# Patient Record
Sex: MALE | Race: WHITE | NOT HISPANIC OR LATINO | Employment: FULL TIME | URBAN - METROPOLITAN AREA
[De-identification: names, ages, dates, MRNs, and addresses within clinical notes are randomized per-mention and may not be internally consistent; named-entity substitution may affect disease eponyms.]

---

## 2017-09-26 ENCOUNTER — HOSPITAL ENCOUNTER (OUTPATIENT)
Dept: GENERAL RADIOLOGY | Facility: HOSPITAL | Age: 72
Discharge: HOME | End: 2017-09-26
Admitting: ORTHOPAEDIC SURGERY

## 2017-09-26 ENCOUNTER — HOSPITAL ENCOUNTER (OUTPATIENT)
Dept: GENERAL RADIOLOGY | Facility: HOSPITAL | Age: 72
Discharge: HOME | End: 2017-09-26

## 2017-09-26 ENCOUNTER — APPOINTMENT (OUTPATIENT)
Dept: PREADMISSION TESTING | Facility: HOSPITAL | Age: 72
End: 2017-09-26

## 2017-09-26 VITALS
WEIGHT: 252 LBS | OXYGEN SATURATION: 97 % | DIASTOLIC BLOOD PRESSURE: 75 MMHG | HEART RATE: 60 BPM | HEIGHT: 70 IN | SYSTOLIC BLOOD PRESSURE: 181 MMHG | BODY MASS INDEX: 36.08 KG/M2 | RESPIRATION RATE: 20 BRPM | TEMPERATURE: 97.5 F

## 2017-09-26 LAB
ABO GROUP BLD: NORMAL
ALBUMIN SERPL-MCNC: 4 G/DL (ref 3.5–5.2)
ALBUMIN/GLOB SERPL: 1 G/DL
ALP SERPL-CCNC: 132 U/L (ref 39–117)
ALT SERPL W P-5'-P-CCNC: 30 U/L (ref 1–41)
ANION GAP SERPL CALCULATED.3IONS-SCNC: 12.3 MMOL/L
AST SERPL-CCNC: 21 U/L (ref 1–40)
BILIRUB SERPL-MCNC: 0.4 MG/DL (ref 0.1–1.2)
BILIRUB UR QL STRIP: NEGATIVE
BLD GP AB SCN SERPL QL: NEGATIVE
BUN BLD-MCNC: 11 MG/DL (ref 8–23)
BUN/CREAT SERPL: 12.1 (ref 7–25)
CALCIUM SPEC-SCNC: 9.5 MG/DL (ref 8.6–10.5)
CHLORIDE SERPL-SCNC: 100 MMOL/L (ref 98–107)
CLARITY UR: CLEAR
CO2 SERPL-SCNC: 22.7 MMOL/L (ref 22–29)
COLOR UR: YELLOW
CREAT BLD-MCNC: 0.91 MG/DL (ref 0.76–1.27)
DEPRECATED RDW RBC AUTO: 43.6 FL (ref 37–54)
ERYTHROCYTE [DISTWIDTH] IN BLOOD BY AUTOMATED COUNT: 13.9 % (ref 11.5–14.5)
GFR SERPL CREATININE-BSD FRML MDRD: 82 ML/MIN/1.73
GLOBULIN UR ELPH-MCNC: 4 GM/DL
GLUCOSE BLD-MCNC: 153 MG/DL (ref 65–99)
GLUCOSE UR STRIP-MCNC: ABNORMAL MG/DL
HCT VFR BLD AUTO: 40.7 % (ref 40.4–52.2)
HGB BLD-MCNC: 13.5 G/DL (ref 13.7–17.6)
HGB UR QL STRIP.AUTO: NEGATIVE
INR PPP: 0.99 (ref 0.9–1.1)
KETONES UR QL STRIP: NEGATIVE
LEUKOCYTE ESTERASE UR QL STRIP.AUTO: NEGATIVE
MCH RBC QN AUTO: 28.4 PG (ref 27–32.7)
MCHC RBC AUTO-ENTMCNC: 33.2 G/DL (ref 32.6–36.4)
MCV RBC AUTO: 85.5 FL (ref 79.8–96.2)
NITRITE UR QL STRIP: NEGATIVE
PH UR STRIP.AUTO: 6.5 [PH] (ref 5–8)
PLATELET # BLD AUTO: 235 10*3/MM3 (ref 140–500)
PMV BLD AUTO: 10.1 FL (ref 6–12)
POTASSIUM BLD-SCNC: 4 MMOL/L (ref 3.5–5.2)
PROT SERPL-MCNC: 8 G/DL (ref 6–8.5)
PROT UR QL STRIP: NEGATIVE
PROTHROMBIN TIME: 12.7 SECONDS (ref 11.7–14.2)
RBC # BLD AUTO: 4.76 10*6/MM3 (ref 4.6–6)
RH BLD: POSITIVE
SODIUM BLD-SCNC: 135 MMOL/L (ref 136–145)
SP GR UR STRIP: 1.01 (ref 1–1.03)
UROBILINOGEN UR QL STRIP: ABNORMAL
WBC NRBC COR # BLD: 5.78 10*3/MM3 (ref 4.5–10.7)

## 2017-09-26 PROCEDURE — 85027 COMPLETE CBC AUTOMATED: CPT | Performed by: ORTHOPAEDIC SURGERY

## 2017-09-26 PROCEDURE — 85610 PROTHROMBIN TIME: CPT | Performed by: ORTHOPAEDIC SURGERY

## 2017-09-26 PROCEDURE — 71020 HC CHEST PA AND LATERAL: CPT

## 2017-09-26 PROCEDURE — 86900 BLOOD TYPING SEROLOGIC ABO: CPT | Performed by: ORTHOPAEDIC SURGERY

## 2017-09-26 PROCEDURE — 73560 X-RAY EXAM OF KNEE 1 OR 2: CPT

## 2017-09-26 PROCEDURE — 86901 BLOOD TYPING SEROLOGIC RH(D): CPT | Performed by: ORTHOPAEDIC SURGERY

## 2017-09-26 PROCEDURE — 93010 ELECTROCARDIOGRAM REPORT: CPT | Performed by: INTERNAL MEDICINE

## 2017-09-26 PROCEDURE — 36415 COLL VENOUS BLD VENIPUNCTURE: CPT

## 2017-09-26 PROCEDURE — 93005 ELECTROCARDIOGRAM TRACING: CPT

## 2017-09-26 PROCEDURE — 81003 URINALYSIS AUTO W/O SCOPE: CPT | Performed by: ORTHOPAEDIC SURGERY

## 2017-09-26 PROCEDURE — 86850 RBC ANTIBODY SCREEN: CPT | Performed by: ORTHOPAEDIC SURGERY

## 2017-09-26 PROCEDURE — 80053 COMPREHEN METABOLIC PANEL: CPT | Performed by: ORTHOPAEDIC SURGERY

## 2017-09-26 RX ORDER — CARVEDILOL 6.25 MG/1
6.25 TABLET ORAL 2 TIMES DAILY WITH MEALS
COMMUNITY

## 2017-09-26 RX ORDER — FUROSEMIDE 40 MG/1
40 TABLET ORAL DAILY
COMMUNITY
End: 2019-09-17

## 2017-09-26 RX ORDER — CHLORHEXIDINE GLUCONATE 500 MG/1
CLOTH TOPICAL
COMMUNITY
End: 2017-10-13 | Stop reason: HOSPADM

## 2017-09-26 RX ORDER — ATORVASTATIN CALCIUM 80 MG/1
80 TABLET, FILM COATED ORAL NIGHTLY
COMMUNITY

## 2017-09-26 RX ORDER — GUAIFENESIN 600 MG/1
1200 TABLET, EXTENDED RELEASE ORAL 2 TIMES DAILY
COMMUNITY
End: 2019-09-17

## 2017-09-26 RX ORDER — ALBUTEROL SULFATE 90 UG/1
2 AEROSOL, METERED RESPIRATORY (INHALATION) EVERY 4 HOURS PRN
COMMUNITY

## 2017-09-26 RX ORDER — POTASSIUM CHLORIDE 750 MG/1
10 TABLET, FILM COATED, EXTENDED RELEASE ORAL DAILY
COMMUNITY
End: 2019-09-17

## 2017-09-26 RX ORDER — BUMETANIDE 1 MG/1
1 TABLET ORAL DAILY
COMMUNITY
End: 2019-08-10 | Stop reason: SDUPTHER

## 2017-09-26 RX ORDER — ALPRAZOLAM 0.25 MG/1
0.25 TABLET ORAL 2 TIMES DAILY PRN
COMMUNITY

## 2017-09-26 RX ORDER — AMLODIPINE BESYLATE 10 MG/1
10 TABLET ORAL DAILY
COMMUNITY

## 2017-09-26 RX ORDER — ESCITALOPRAM OXALATE 10 MG/1
10 TABLET ORAL DAILY
COMMUNITY

## 2017-09-26 RX ORDER — ASPIRIN 81 MG/1
81 TABLET, CHEWABLE ORAL DAILY
COMMUNITY
End: 2017-10-13 | Stop reason: HOSPADM

## 2017-09-26 ASSESSMENT — KOOS JR
KOOS JR SCORE: 44.905
KOOS JR SCORE: 17

## 2017-10-10 ENCOUNTER — ANESTHESIA (OUTPATIENT)
Dept: PERIOP | Facility: HOSPITAL | Age: 72
End: 2017-10-10

## 2017-10-10 ENCOUNTER — HOSPITAL ENCOUNTER (INPATIENT)
Facility: HOSPITAL | Age: 72
LOS: 3 days | Discharge: HOME HEALTH | End: 2017-10-13
Attending: ORTHOPAEDIC SURGERY | Admitting: ORTHOPAEDIC SURGERY

## 2017-10-10 ENCOUNTER — ANESTHESIA EVENT (OUTPATIENT)
Dept: PERIOP | Facility: HOSPITAL | Age: 72
End: 2017-10-10

## 2017-10-10 ENCOUNTER — APPOINTMENT (OUTPATIENT)
Dept: GENERAL RADIOLOGY | Facility: HOSPITAL | Age: 72
End: 2017-10-10

## 2017-10-10 DIAGNOSIS — R26.2 DIFFICULTY WALKING: Primary | ICD-10-CM

## 2017-10-10 PROBLEM — M17.9 OA (OSTEOARTHRITIS) OF KNEE: Status: ACTIVE | Noted: 2017-10-10

## 2017-10-10 PROCEDURE — 25010000002 FENTANYL CITRATE (PF) 100 MCG/2ML SOLUTION: Performed by: NURSE ANESTHETIST, CERTIFIED REGISTERED

## 2017-10-10 PROCEDURE — 25010000002 EPINEPHRINE PER 0.1 MG: Performed by: ORTHOPAEDIC SURGERY

## 2017-10-10 PROCEDURE — C1776 JOINT DEVICE (IMPLANTABLE): HCPCS | Performed by: ORTHOPAEDIC SURGERY

## 2017-10-10 PROCEDURE — 25010000002 ONDANSETRON PER 1 MG: Performed by: ORTHOPAEDIC SURGERY

## 2017-10-10 PROCEDURE — C1713 ANCHOR/SCREW BN/BN,TIS/BN: HCPCS | Performed by: ORTHOPAEDIC SURGERY

## 2017-10-10 PROCEDURE — 25010000002 MORPHINE (PF) 10 MG/ML SOLUTION 1 ML VIAL: Performed by: ORTHOPAEDIC SURGERY

## 2017-10-10 PROCEDURE — 25010000002 MIDAZOLAM PER 1 MG: Performed by: ANESTHESIOLOGY

## 2017-10-10 PROCEDURE — 0SRD0J9 REPLACEMENT OF LEFT KNEE JOINT WITH SYNTHETIC SUBSTITUTE, CEMENTED, OPEN APPROACH: ICD-10-PCS | Performed by: ORTHOPAEDIC SURGERY

## 2017-10-10 PROCEDURE — 73560 X-RAY EXAM OF KNEE 1 OR 2: CPT

## 2017-10-10 PROCEDURE — 25010000003 CEFAZOLIN IN DEXTROSE 2-4 GM/100ML-% SOLUTION: Performed by: ORTHOPAEDIC SURGERY

## 2017-10-10 PROCEDURE — 25010000002 HYDROMORPHONE PER 4 MG: Performed by: NURSE ANESTHETIST, CERTIFIED REGISTERED

## 2017-10-10 PROCEDURE — 25010000002 ROPIVACAINE PER 1 MG: Performed by: ORTHOPAEDIC SURGERY

## 2017-10-10 PROCEDURE — 25010000002 PROPOFOL 10 MG/ML EMULSION: Performed by: NURSE ANESTHETIST, CERTIFIED REGISTERED

## 2017-10-10 PROCEDURE — 25010000002 ONDANSETRON PER 1 MG: Performed by: NURSE ANESTHETIST, CERTIFIED REGISTERED

## 2017-10-10 PROCEDURE — 25010000002 KETOROLAC TROMETHAMINE PER 15 MG: Performed by: ORTHOPAEDIC SURGERY

## 2017-10-10 DEVICE — TRY TIB INTERLOK PRI 75MM: Type: IMPLANTABLE DEVICE | Status: FUNCTIONAL

## 2017-10-10 DEVICE — PAT 3PEG THN 34X7.8 34MM: Type: IMPLANTABLE DEVICE | Status: FUNCTIONAL

## 2017-10-10 DEVICE — STEM TIB PRI FINN 46X40MM: Type: IMPLANTABLE DEVICE | Status: FUNCTIONAL

## 2017-10-10 DEVICE — CMT BONE PALACOS 120001: Type: IMPLANTABLE DEVICE | Status: FUNCTIONAL

## 2017-10-10 DEVICE — IMPLANTABLE DEVICE: Type: IMPLANTABLE DEVICE | Status: FUNCTIONAL

## 2017-10-10 DEVICE — CAP TOTL KN CMT PREMIUM: Type: IMPLANTABLE DEVICE | Status: FUNCTIONAL

## 2017-10-10 DEVICE — COMP FEM/KN VANGUARD INTLK CR 65MM NS LT: Type: IMPLANTABLE DEVICE | Status: FUNCTIONAL

## 2017-10-10 RX ORDER — SODIUM CHLORIDE 0.9 % (FLUSH) 0.9 %
1-10 SYRINGE (ML) INJECTION AS NEEDED
Status: DISCONTINUED | OUTPATIENT
Start: 2017-10-10 | End: 2017-10-13 | Stop reason: HOSPADM

## 2017-10-10 RX ORDER — MORPHINE SULFATE 2 MG/ML
6 INJECTION, SOLUTION INTRAMUSCULAR; INTRAVENOUS
Status: DISCONTINUED | OUTPATIENT
Start: 2017-10-10 | End: 2017-10-13 | Stop reason: HOSPADM

## 2017-10-10 RX ORDER — FENTANYL CITRATE 50 UG/ML
INJECTION, SOLUTION INTRAMUSCULAR; INTRAVENOUS AS NEEDED
Status: DISCONTINUED | OUTPATIENT
Start: 2017-10-10 | End: 2017-10-10 | Stop reason: SURG

## 2017-10-10 RX ORDER — FERROUS SULFATE 325(65) MG
325 TABLET ORAL
Status: DISCONTINUED | OUTPATIENT
Start: 2017-10-11 | End: 2017-10-13 | Stop reason: HOSPADM

## 2017-10-10 RX ORDER — SODIUM CHLORIDE 450 MG/100ML
100 INJECTION, SOLUTION INTRAVENOUS CONTINUOUS
Status: DISCONTINUED | OUTPATIENT
Start: 2017-10-10 | End: 2017-10-13 | Stop reason: HOSPADM

## 2017-10-10 RX ORDER — MIDAZOLAM HYDROCHLORIDE 1 MG/ML
1 INJECTION INTRAMUSCULAR; INTRAVENOUS
Status: DISCONTINUED | OUTPATIENT
Start: 2017-10-10 | End: 2017-10-10 | Stop reason: HOSPADM

## 2017-10-10 RX ORDER — PROMETHAZINE HYDROCHLORIDE 25 MG/1
25 SUPPOSITORY RECTAL ONCE AS NEEDED
Status: DISCONTINUED | OUTPATIENT
Start: 2017-10-10 | End: 2017-10-10 | Stop reason: HOSPADM

## 2017-10-10 RX ORDER — ESCITALOPRAM OXALATE 10 MG/1
10 TABLET ORAL DAILY
Status: DISCONTINUED | OUTPATIENT
Start: 2017-10-10 | End: 2017-10-13 | Stop reason: HOSPADM

## 2017-10-10 RX ORDER — ONDANSETRON 2 MG/ML
4 INJECTION INTRAMUSCULAR; INTRAVENOUS EVERY 6 HOURS PRN
Status: DISCONTINUED | OUTPATIENT
Start: 2017-10-10 | End: 2017-10-13 | Stop reason: HOSPADM

## 2017-10-10 RX ORDER — OXYCODONE HYDROCHLORIDE AND ACETAMINOPHEN 5; 325 MG/1; MG/1
2 TABLET ORAL EVERY 4 HOURS PRN
Status: DISCONTINUED | OUTPATIENT
Start: 2017-10-10 | End: 2017-10-11 | Stop reason: SDUPTHER

## 2017-10-10 RX ORDER — UREA 10 %
1 LOTION (ML) TOPICAL NIGHTLY PRN
Status: DISCONTINUED | OUTPATIENT
Start: 2017-10-10 | End: 2017-10-13 | Stop reason: HOSPADM

## 2017-10-10 RX ORDER — CEFAZOLIN SODIUM 2 G/100ML
2 INJECTION, SOLUTION INTRAVENOUS ONCE
Status: COMPLETED | OUTPATIENT
Start: 2017-10-10 | End: 2017-10-10

## 2017-10-10 RX ORDER — HYDRALAZINE HYDROCHLORIDE 20 MG/ML
5 INJECTION INTRAMUSCULAR; INTRAVENOUS
Status: DISCONTINUED | OUTPATIENT
Start: 2017-10-10 | End: 2017-10-10 | Stop reason: HOSPADM

## 2017-10-10 RX ORDER — ACETAMINOPHEN 500 MG
1000 TABLET ORAL ONCE
Status: COMPLETED | OUTPATIENT
Start: 2017-10-10 | End: 2017-10-10

## 2017-10-10 RX ORDER — CELECOXIB 200 MG/1
200 CAPSULE ORAL ONCE
Status: COMPLETED | OUTPATIENT
Start: 2017-10-10 | End: 2017-10-10

## 2017-10-10 RX ORDER — CLINDAMYCIN PHOSPHATE 900 MG/50ML
900 INJECTION INTRAVENOUS ONCE
Status: DISCONTINUED | OUTPATIENT
Start: 2017-10-10 | End: 2017-10-10 | Stop reason: HOSPADM

## 2017-10-10 RX ORDER — GUAIFENESIN 600 MG/1
1200 TABLET, EXTENDED RELEASE ORAL 2 TIMES DAILY
Status: DISCONTINUED | OUTPATIENT
Start: 2017-10-10 | End: 2017-10-13 | Stop reason: HOSPADM

## 2017-10-10 RX ORDER — ACETAMINOPHEN 325 MG/1
650 TABLET ORAL EVERY 4 HOURS PRN
Status: DISCONTINUED | OUTPATIENT
Start: 2017-10-10 | End: 2017-10-13 | Stop reason: HOSPADM

## 2017-10-10 RX ORDER — NALOXONE HCL 0.4 MG/ML
0.2 VIAL (ML) INJECTION AS NEEDED
Status: DISCONTINUED | OUTPATIENT
Start: 2017-10-10 | End: 2017-10-10 | Stop reason: HOSPADM

## 2017-10-10 RX ORDER — DIPHENHYDRAMINE HCL 25 MG
50 CAPSULE ORAL EVERY 6 HOURS PRN
Status: DISCONTINUED | OUTPATIENT
Start: 2017-10-10 | End: 2017-10-13 | Stop reason: HOSPADM

## 2017-10-10 RX ORDER — TRANEXAMIC ACID 100 MG/ML
INJECTION, SOLUTION INTRAVENOUS AS NEEDED
Status: DISCONTINUED | OUTPATIENT
Start: 2017-10-10 | End: 2017-10-10 | Stop reason: SURG

## 2017-10-10 RX ORDER — FUROSEMIDE 40 MG/1
40 TABLET ORAL DAILY
Status: DISCONTINUED | OUTPATIENT
Start: 2017-10-10 | End: 2017-10-13 | Stop reason: HOSPADM

## 2017-10-10 RX ORDER — DIAZEPAM 5 MG/1
5 TABLET ORAL 2 TIMES DAILY PRN
Status: DISCONTINUED | OUTPATIENT
Start: 2017-10-10 | End: 2017-10-13 | Stop reason: HOSPADM

## 2017-10-10 RX ORDER — BISACODYL 10 MG
10 SUPPOSITORY, RECTAL RECTAL DAILY PRN
Status: DISCONTINUED | OUTPATIENT
Start: 2017-10-10 | End: 2017-10-13 | Stop reason: HOSPADM

## 2017-10-10 RX ORDER — OXYCODONE HYDROCHLORIDE AND ACETAMINOPHEN 5; 325 MG/1; MG/1
1 TABLET ORAL EVERY 4 HOURS PRN
Status: DISCONTINUED | OUTPATIENT
Start: 2017-10-10 | End: 2017-10-13 | Stop reason: HOSPADM

## 2017-10-10 RX ORDER — FENTANYL CITRATE 50 UG/ML
50 INJECTION, SOLUTION INTRAMUSCULAR; INTRAVENOUS
Status: DISCONTINUED | OUTPATIENT
Start: 2017-10-10 | End: 2017-10-10 | Stop reason: HOSPADM

## 2017-10-10 RX ORDER — PROMETHAZINE HYDROCHLORIDE 25 MG/ML
12.5 INJECTION, SOLUTION INTRAMUSCULAR; INTRAVENOUS ONCE AS NEEDED
Status: DISCONTINUED | OUTPATIENT
Start: 2017-10-10 | End: 2017-10-10 | Stop reason: HOSPADM

## 2017-10-10 RX ORDER — SENNA AND DOCUSATE SODIUM 50; 8.6 MG/1; MG/1
2 TABLET, FILM COATED ORAL 2 TIMES DAILY
Status: DISCONTINUED | OUTPATIENT
Start: 2017-10-10 | End: 2017-10-13 | Stop reason: HOSPADM

## 2017-10-10 RX ORDER — ALPRAZOLAM 0.5 MG/1
0.25 TABLET ORAL 2 TIMES DAILY PRN
Status: DISCONTINUED | OUTPATIENT
Start: 2017-10-10 | End: 2017-10-13 | Stop reason: HOSPADM

## 2017-10-10 RX ORDER — FLUMAZENIL 0.1 MG/ML
0.2 INJECTION INTRAVENOUS AS NEEDED
Status: DISCONTINUED | OUTPATIENT
Start: 2017-10-10 | End: 2017-10-10 | Stop reason: HOSPADM

## 2017-10-10 RX ORDER — LABETALOL HYDROCHLORIDE 5 MG/ML
5 INJECTION, SOLUTION INTRAVENOUS
Status: DISCONTINUED | OUTPATIENT
Start: 2017-10-10 | End: 2017-10-10 | Stop reason: HOSPADM

## 2017-10-10 RX ORDER — FAMOTIDINE 10 MG/ML
20 INJECTION, SOLUTION INTRAVENOUS ONCE
Status: COMPLETED | OUTPATIENT
Start: 2017-10-10 | End: 2017-10-10

## 2017-10-10 RX ORDER — IPRATROPIUM BROMIDE AND ALBUTEROL SULFATE 2.5; .5 MG/3ML; MG/3ML
3 SOLUTION RESPIRATORY (INHALATION)
Status: DISCONTINUED | OUTPATIENT
Start: 2017-10-10 | End: 2017-10-10

## 2017-10-10 RX ORDER — ASPIRIN 325 MG
325 TABLET, DELAYED RELEASE (ENTERIC COATED) ORAL 2 TIMES DAILY WITH MEALS
Status: DISCONTINUED | OUTPATIENT
Start: 2017-10-10 | End: 2017-10-13 | Stop reason: HOSPADM

## 2017-10-10 RX ORDER — OXYCODONE HYDROCHLORIDE AND ACETAMINOPHEN 5; 325 MG/1; MG/1
1 TABLET ORAL EVERY 4 HOURS PRN
Status: DISCONTINUED | OUTPATIENT
Start: 2017-10-10 | End: 2017-10-10

## 2017-10-10 RX ORDER — EPHEDRINE SULFATE 50 MG/ML
5 INJECTION, SOLUTION INTRAVENOUS ONCE AS NEEDED
Status: DISCONTINUED | OUTPATIENT
Start: 2017-10-10 | End: 2017-10-10 | Stop reason: HOSPADM

## 2017-10-10 RX ORDER — PROPOFOL 10 MG/ML
VIAL (ML) INTRAVENOUS AS NEEDED
Status: DISCONTINUED | OUTPATIENT
Start: 2017-10-10 | End: 2017-10-10 | Stop reason: SURG

## 2017-10-10 RX ORDER — DIAZEPAM 5 MG/ML
5 INJECTION, SOLUTION INTRAMUSCULAR; INTRAVENOUS 2 TIMES DAILY PRN
Status: ACTIVE | OUTPATIENT
Start: 2017-10-10 | End: 2017-10-11

## 2017-10-10 RX ORDER — OXYCODONE HYDROCHLORIDE AND ACETAMINOPHEN 5; 325 MG/1; MG/1
2 TABLET ORAL EVERY 4 HOURS PRN
Status: DISCONTINUED | OUTPATIENT
Start: 2017-10-10 | End: 2017-10-13 | Stop reason: HOSPADM

## 2017-10-10 RX ORDER — ALBUTEROL SULFATE 90 UG/1
2 AEROSOL, METERED RESPIRATORY (INHALATION) EVERY 4 HOURS PRN
Status: DISCONTINUED | OUTPATIENT
Start: 2017-10-10 | End: 2017-10-13 | Stop reason: HOSPADM

## 2017-10-10 RX ORDER — DIPHENHYDRAMINE HYDROCHLORIDE 50 MG/ML
12.5 INJECTION INTRAMUSCULAR; INTRAVENOUS
Status: DISCONTINUED | OUTPATIENT
Start: 2017-10-10 | End: 2017-10-10 | Stop reason: HOSPADM

## 2017-10-10 RX ORDER — ACETAMINOPHEN 325 MG/1
325 TABLET ORAL EVERY 4 HOURS PRN
Status: DISCONTINUED | OUTPATIENT
Start: 2017-10-10 | End: 2017-10-13 | Stop reason: HOSPADM

## 2017-10-10 RX ORDER — SODIUM CHLORIDE 0.9 % (FLUSH) 0.9 %
1-10 SYRINGE (ML) INJECTION AS NEEDED
Status: DISCONTINUED | OUTPATIENT
Start: 2017-10-10 | End: 2017-10-10 | Stop reason: HOSPADM

## 2017-10-10 RX ORDER — CARVEDILOL 6.25 MG/1
6.25 TABLET ORAL 2 TIMES DAILY WITH MEALS
Status: DISCONTINUED | OUTPATIENT
Start: 2017-10-10 | End: 2017-10-13 | Stop reason: HOSPADM

## 2017-10-10 RX ORDER — ONDANSETRON 4 MG/1
4 TABLET, FILM COATED ORAL EVERY 6 HOURS PRN
Status: DISCONTINUED | OUTPATIENT
Start: 2017-10-10 | End: 2017-10-13 | Stop reason: HOSPADM

## 2017-10-10 RX ORDER — ATORVASTATIN CALCIUM 80 MG/1
80 TABLET, FILM COATED ORAL NIGHTLY
Status: DISCONTINUED | OUTPATIENT
Start: 2017-10-10 | End: 2017-10-13 | Stop reason: HOSPADM

## 2017-10-10 RX ORDER — POTASSIUM CHLORIDE 750 MG/1
10 CAPSULE, EXTENDED RELEASE ORAL DAILY
Status: DISCONTINUED | OUTPATIENT
Start: 2017-10-10 | End: 2017-10-13 | Stop reason: HOSPADM

## 2017-10-10 RX ORDER — HYDROMORPHONE HYDROCHLORIDE 1 MG/ML
0.5 INJECTION, SOLUTION INTRAMUSCULAR; INTRAVENOUS; SUBCUTANEOUS
Status: DISCONTINUED | OUTPATIENT
Start: 2017-10-10 | End: 2017-10-10 | Stop reason: HOSPADM

## 2017-10-10 RX ORDER — ONDANSETRON 4 MG/1
4 TABLET, ORALLY DISINTEGRATING ORAL EVERY 6 HOURS PRN
Status: DISCONTINUED | OUTPATIENT
Start: 2017-10-10 | End: 2017-10-13 | Stop reason: HOSPADM

## 2017-10-10 RX ORDER — CLINDAMYCIN PHOSPHATE 900 MG/50ML
900 INJECTION INTRAVENOUS EVERY 8 HOURS
Status: COMPLETED | OUTPATIENT
Start: 2017-10-10 | End: 2017-10-11

## 2017-10-10 RX ORDER — BUMETANIDE 1 MG/1
1 TABLET ORAL DAILY
Status: DISCONTINUED | OUTPATIENT
Start: 2017-10-10 | End: 2017-10-13 | Stop reason: HOSPADM

## 2017-10-10 RX ORDER — DOCUSATE SODIUM 100 MG/1
100 CAPSULE, LIQUID FILLED ORAL 2 TIMES DAILY PRN
Status: DISCONTINUED | OUTPATIENT
Start: 2017-10-10 | End: 2017-10-13 | Stop reason: HOSPADM

## 2017-10-10 RX ORDER — MAGNESIUM HYDROXIDE 1200 MG/15ML
LIQUID ORAL AS NEEDED
Status: DISCONTINUED | OUTPATIENT
Start: 2017-10-10 | End: 2017-10-10 | Stop reason: HOSPADM

## 2017-10-10 RX ORDER — IPRATROPIUM BROMIDE AND ALBUTEROL SULFATE 2.5; .5 MG/3ML; MG/3ML
3 SOLUTION RESPIRATORY (INHALATION)
Status: DISCONTINUED | OUTPATIENT
Start: 2017-10-10 | End: 2017-10-10 | Stop reason: HOSPADM

## 2017-10-10 RX ORDER — NALOXONE HCL 0.4 MG/ML
0.4 VIAL (ML) INJECTION
Status: DISCONTINUED | OUTPATIENT
Start: 2017-10-10 | End: 2017-10-13 | Stop reason: HOSPADM

## 2017-10-10 RX ORDER — SODIUM CHLORIDE, SODIUM LACTATE, POTASSIUM CHLORIDE, CALCIUM CHLORIDE 600; 310; 30; 20 MG/100ML; MG/100ML; MG/100ML; MG/100ML
9 INJECTION, SOLUTION INTRAVENOUS CONTINUOUS
Status: DISCONTINUED | OUTPATIENT
Start: 2017-10-10 | End: 2017-10-13 | Stop reason: HOSPADM

## 2017-10-10 RX ORDER — ONDANSETRON 2 MG/ML
INJECTION INTRAMUSCULAR; INTRAVENOUS AS NEEDED
Status: DISCONTINUED | OUTPATIENT
Start: 2017-10-10 | End: 2017-10-10 | Stop reason: SURG

## 2017-10-10 RX ORDER — ONDANSETRON 2 MG/ML
4 INJECTION INTRAMUSCULAR; INTRAVENOUS ONCE AS NEEDED
Status: DISCONTINUED | OUTPATIENT
Start: 2017-10-10 | End: 2017-10-10 | Stop reason: HOSPADM

## 2017-10-10 RX ORDER — MIDAZOLAM HYDROCHLORIDE 1 MG/ML
2 INJECTION INTRAMUSCULAR; INTRAVENOUS
Status: DISCONTINUED | OUTPATIENT
Start: 2017-10-10 | End: 2017-10-10 | Stop reason: HOSPADM

## 2017-10-10 RX ORDER — ROCURONIUM BROMIDE 10 MG/ML
INJECTION, SOLUTION INTRAVENOUS AS NEEDED
Status: DISCONTINUED | OUTPATIENT
Start: 2017-10-10 | End: 2017-10-10 | Stop reason: SURG

## 2017-10-10 RX ORDER — KETOROLAC TROMETHAMINE 30 MG/ML
15 INJECTION, SOLUTION INTRAMUSCULAR; INTRAVENOUS EVERY 8 HOURS PRN
Status: DISPENSED | OUTPATIENT
Start: 2017-10-10 | End: 2017-10-12

## 2017-10-10 RX ORDER — AMLODIPINE BESYLATE 10 MG/1
10 TABLET ORAL DAILY
Status: DISCONTINUED | OUTPATIENT
Start: 2017-10-10 | End: 2017-10-13 | Stop reason: HOSPADM

## 2017-10-10 RX ORDER — PROMETHAZINE HYDROCHLORIDE 25 MG/1
25 TABLET ORAL ONCE AS NEEDED
Status: DISCONTINUED | OUTPATIENT
Start: 2017-10-10 | End: 2017-10-10 | Stop reason: HOSPADM

## 2017-10-10 RX ADMIN — ONDANSETRON 4 MG: 2 INJECTION INTRAMUSCULAR; INTRAVENOUS at 15:24

## 2017-10-10 RX ADMIN — SODIUM CHLORIDE, POTASSIUM CHLORIDE, SODIUM LACTATE AND CALCIUM CHLORIDE: 600; 310; 30; 20 INJECTION, SOLUTION INTRAVENOUS at 15:20

## 2017-10-10 RX ADMIN — ATORVASTATIN CALCIUM 80 MG: 80 TABLET, FILM COATED ORAL at 20:29

## 2017-10-10 RX ADMIN — CEFAZOLIN SODIUM 2 G: 2 INJECTION, SOLUTION INTRAVENOUS at 13:47

## 2017-10-10 RX ADMIN — MIDAZOLAM 1 MG: 1 INJECTION INTRAMUSCULAR; INTRAVENOUS at 13:26

## 2017-10-10 RX ADMIN — HYDROMORPHONE HYDROCHLORIDE 0.5 MG: 1 INJECTION, SOLUTION INTRAMUSCULAR; INTRAVENOUS; SUBCUTANEOUS at 16:00

## 2017-10-10 RX ADMIN — ONDANSETRON 4 MG: 2 INJECTION INTRAMUSCULAR; INTRAVENOUS at 18:22

## 2017-10-10 RX ADMIN — FAMOTIDINE 20 MG: 10 INJECTION, SOLUTION INTRAVENOUS at 13:25

## 2017-10-10 RX ADMIN — CARVEDILOL 6.25 MG: 6.25 TABLET, FILM COATED ORAL at 20:29

## 2017-10-10 RX ADMIN — FENTANYL CITRATE 50 MCG: 50 INJECTION INTRAMUSCULAR; INTRAVENOUS at 16:25

## 2017-10-10 RX ADMIN — ASPIRIN 325 MG: 325 TABLET, DELAYED RELEASE ORAL at 20:29

## 2017-10-10 RX ADMIN — FENTANYL CITRATE 100 MCG: 50 INJECTION INTRAMUSCULAR; INTRAVENOUS at 13:43

## 2017-10-10 RX ADMIN — CEFAZOLIN SODIUM 2 G: 2 INJECTION, SOLUTION INTRAVENOUS at 15:14

## 2017-10-10 RX ADMIN — TRANEXAMIC ACID 1000 MG: 100 INJECTION, SOLUTION INTRAVENOUS at 15:00

## 2017-10-10 RX ADMIN — ACETAMINOPHEN 1000 MG: 500 TABLET ORAL at 10:48

## 2017-10-10 RX ADMIN — SODIUM CHLORIDE, POTASSIUM CHLORIDE, SODIUM LACTATE AND CALCIUM CHLORIDE 9 ML/HR: 600; 310; 30; 20 INJECTION, SOLUTION INTRAVENOUS at 13:29

## 2017-10-10 RX ADMIN — PROPOFOL 200 MG: 10 INJECTION, EMULSION INTRAVENOUS at 13:51

## 2017-10-10 RX ADMIN — OXYCODONE HYDROCHLORIDE AND ACETAMINOPHEN 1 TABLET: 5; 325 TABLET ORAL at 20:31

## 2017-10-10 RX ADMIN — CELECOXIB 200 MG: 200 CAPSULE ORAL at 10:48

## 2017-10-10 RX ADMIN — FENTANYL CITRATE 50 MCG: 50 INJECTION INTRAMUSCULAR; INTRAVENOUS at 13:47

## 2017-10-10 RX ADMIN — HYDROMORPHONE HYDROCHLORIDE 0.5 MG: 1 INJECTION, SOLUTION INTRAMUSCULAR; INTRAVENOUS; SUBCUTANEOUS at 15:51

## 2017-10-10 RX ADMIN — KETOROLAC TROMETHAMINE 15 MG: 30 INJECTION, SOLUTION INTRAMUSCULAR at 15:51

## 2017-10-10 RX ADMIN — FENTANYL CITRATE 50 MCG: 50 INJECTION INTRAMUSCULAR; INTRAVENOUS at 16:45

## 2017-10-10 RX ADMIN — ESCITALOPRAM 10 MG: 10 TABLET, FILM COATED ORAL at 20:29

## 2017-10-10 RX ADMIN — SUGAMMADEX 200 MG: 100 INJECTION, SOLUTION INTRAVENOUS at 15:27

## 2017-10-10 RX ADMIN — HYDROMORPHONE HYDROCHLORIDE 0.5 MG: 1 INJECTION, SOLUTION INTRAMUSCULAR; INTRAVENOUS; SUBCUTANEOUS at 16:35

## 2017-10-10 RX ADMIN — ROCURONIUM BROMIDE 40 MG: 10 INJECTION INTRAVENOUS at 13:51

## 2017-10-10 RX ADMIN — GUAIFENESIN 1200 MG: 600 TABLET, EXTENDED RELEASE ORAL at 20:29

## 2017-10-10 RX ADMIN — HYDROMORPHONE HYDROCHLORIDE 0.5 MG: 1 INJECTION, SOLUTION INTRAMUSCULAR; INTRAVENOUS; SUBCUTANEOUS at 16:17

## 2017-10-10 NOTE — ANESTHESIA PROCEDURE NOTES
Airway  Urgency: elective    Date/Time: 10/10/2017 1:53 PM    General Information and Staff    Patient location during procedure: OR  Anesthesiologist: PERICO PEREZ  CRNA: JUDITH RYAN    Indications and Patient Condition  Indications for airway management: airway protection    Preoxygenated: yes  Mask difficulty assessment: 2 - vent by mask + OA or adjuvant +/- NMBA    Final Airway Details  Final airway type: endotracheal airway      Successful airway: ETT  Cuffed: yes   Successful intubation technique: direct laryngoscopy  Endotracheal tube insertion site: oral  Blade: Maravilla  Blade size: #2  ETT size: 8.0 mm  Cormack-Lehane Classification: grade I - full view of glottis  Placement verified by: chest auscultation and capnometry   Cuff volume (mL): 8  Measured from: lips  ETT to lips (cm): 22  Number of attempts at approach: 1    Additional Comments  SIVI.  EYES TAPED CLOSED PRIOR TO DL.  INTUBATION AS CHARTED ABOVE.  APPEARS ATRAUMATIC.  NO CHANGE TO DENTITION. +ETCO2. +BBS. +CR.

## 2017-10-10 NOTE — OP NOTE
Operative Note    Patient Name:  Ricky Nunez  YOB: 1945  Medical Records Number:  [id number]    Date of Procedure:  10/10/2017    Pre-operative Diagnosis:  Primary Osteoarthritis Left Knee    Post-operative Diagnosis:  Primary Osteoarthritis Left Knee    Procedure Performed:  Left Total Knee Arthroplasty    Implants:  Biomet Vanguard TKA, 65 Femoral Component, 75 Tibial Tray with a 40 mm Modular Stem, 34 3-Peg Patella, 13 std.  Polyethylene Insert    Surgeon:  Maksim Pinto M.D.    Assistant: Aida Sales (who was present during the critical portions of the case, thereby decreasing operative time and patient morbidity)    Anesthetic Type:  General    Estimated Blood Loss:  250cc's    Specimens: None    No Complications      Indications for Procedure:  Ricky Nunez is a 72 y.o. male suffering from end stage degenerative changes in the left knee.  The patients pain is becoming disabling, despite extensive conservative care, including NSAIDS, therapy and injections.  The patient wishes to undergo left total knee arthroplasty.  The risks, benefits and alternatives were discussed and the patient wishes to proceed with total knee arthroplasty.      Procedure Performed:    After informed consent was obtained and pre-operative IV Kefzol given, prior to tourniquet inflation, the patient was taken to the operating room and placed supine on the operating table.  After general anesthesia induced, the patient's left lower extremity was prepped with chloraprep and draped in a sterile fashion.    A midline incision was made overlying the left knee and we sharply dissected down to expose the parapatellar retinaculum.  A mid-vastus, muscle sparing, parapatellar arthrotomy was performed and we elevated the soft tissue both medially and laterally.  The menisci and ACL were excised.  We everted the patella and measured this to be 23 mm and we removed 8 mm of bone down to 15mm.  We measured the patella to  be 34 and drilled our three drill holes.  We protected the patella with the patella protector and turned our attention to the femur and the tibia.    We drilled drill holes into the femoral and the tibial intramedullary canals and proceeded to irrigate the canals to remove the fatty marrow.  We used the intramedullary latrice and the 5 degree valgus cut block to make our distal femoral cut.  Once we had a smooth surface, we measured the femur to be 65.  We assured we had rotational alignment using the epicondylar axis, then we used the four-in-one cut block to make our anterior, posterior, anterior and posterior chamfer cuts.  We placed our femoral trial, had excellent fit, and extended the knee and marked Quoc's line on the tibia.      We then turned our attention to the tibia, where we irrigated the canal again.  We used the intramedullary latrice and the 3 degree posterior sloped cut block to remove 2 mm of bone from the effected side of the tibia.  Prior to making our cut, we assured we had rotational alignment using the external guide and Quoc's line.  Once we had a smooth surface, we measured the tibia to be 75.  We then removed soft tissue and bony debris from the posterior aspect of the knee.    We placed our trial implants and had excellent fit, range of motion, stability and ligament balance, throughout a complete arc of motion with a 14 std. polyethylene liner.  We removed our trial implants, punched the tibial keel, copiously irrigated the knee, gave 1 gm of IV Tranxemic Acid, then cemented our implants in place using palacos cement.  Once the cement cured, we trialed again with the 12 and 14 standard and posterior lipped polyethylene liners.  The 12's were too loose and the 14's were too tight, so we chose a 13 std.  The 13 std. gave us the best range of motion, stability and ligament balance, throughout a complete arc of motion.  We removed the trials, copiously irrigated the knee, gave IV  "antibiotics and local anesthetic, then placed our permanent polyethylene liner.  We placed a 1/8\" hemovac drain, then closed the arthrotomy with #1 Vicryl pop-off sutures.  The subcutaneous tissue was closed with 2-0 vicryl pop-off sutures.  The skin was closed with staples.  We placed a sterile dressing of Xeroform, 4x4's, abdominal pads, cast padding and an Ace Wrap.  The patient was then awakened from general anesthesia and taken to the recovery room in stable condition.    The patient will be started on Aspirin 325mg twice daily for DVT prophylaxis.  IV antibiotics will be discontinued within 24 hours of surgery.  Immediately prior to surgery, there were no acute Thromboembolic nor Cardiovascular risk factors.  An updated Medical Reconciliation form is on the chart.        "

## 2017-10-10 NOTE — H&P
"History and Physical    Patient Name:  Ricky Nunez  YOB: 1945    Medical Records Number:  [id number]    Date of Admission:  10/10/2017  9:46 AM    Chief Complaint:  OA (osteoarthritis) of knee [M17.10]    Ricky Nunez is a 72 y.o. male who presents c/o severe left knee pain.  The pain has been on and off for many years, worsening to the point where the pain is becoming disabling.  The pain is a constant dull ache with occasional sharp, stabbing pain.  The patient has failed conservative treatment and would like to proceed with total knee arthroplasty.    /77 (BP Location: Right arm, Patient Position: Lying)  Pulse 64  Temp 98.1 °F (36.7 °C) (Oral)   Resp 20  Ht 69\" (175.3 cm)  Wt 249 lb 11.2 oz (113 kg)  SpO2 96%  BMI 36.87 kg/m2    Past Medical History:    Past Medical History:   Diagnosis Date   • Acid reflux    • Arthritis    • Cancer     SKIN   • COPD (chronic obstructive pulmonary disease)    • Coronary artery disease    • Heart attack    • Hypertension    • Knee pain     LEFT   • Seasonal allergies    • Sleep apnea     CPAP       Social History:    Social History     Social History   • Marital status:      Spouse name: N/A   • Number of children: N/A   • Years of education: N/A     Occupational History   • Not on file.     Social History Main Topics   • Smoking status: Former Smoker     Packs/day: 2.00     Years: 50.00     Types: Cigarettes     Quit date: 9/26/2015   • Smokeless tobacco: Never Used   • Alcohol use No   • Drug use: No   • Sexual activity: Defer     Other Topics Concern   • Not on file     Social History Narrative       Family History:    Family History   Problem Relation Age of Onset   • Malig Hyperthermia Neg Hx        Current Medications:    Current Facility-Administered Medications:   •  ceFAZolin in dextrose (ANCEF) IVPB solution 2 g, 2 g, Intravenous, Once, Maksim Pinto MD  •  clindamycin (CLEOCIN) 900 mg in dextrose 5% 50 mL IVPB " (premix), 900 mg, Intravenous, Once, Maksim Pinto MD  •  famotidine (PEPCID) injection 20 mg, 20 mg, Intravenous, Once, Lucille Bartholomew MD  •  fentaNYL citrate (PF) (SUBLIMAZE) injection 50 mcg, 50 mcg, Intravenous, Q10 Min PRN, Lucille Bartholomew MD  •  lactated ringers infusion, 9 mL/hr, Intravenous, Continuous, Luclile Bartholomew MD  •  midazolam (VERSED) injection 1 mg, 1 mg, Intravenous, Q5 Min PRN **OR** midazolam (VERSED) injection 2 mg, 2 mg, Intravenous, Q5 Min PRN, Lucille Bartholomew MD  •  ropivacaine (NAROPIN) 50 mL, Morphine (PF) 5 mg, ketorolac (TORADOL) 30 mg, EPINEPHrine (ADRENALIN) 0.3 mg in sodium chloride 0.9 % 101.8 mL, , Injection, Once, Maksim Pinto MD  •  sodium chloride 0.9 % flush 1-10 mL, 1-10 mL, Intravenous, PRN, Lucille Bartholomew MD    Allergies:  No Known Allergies    Review of Systems:   HEENT: Patient denies any headaches, vision changes, change in hearing, or tinnitus, Patient denies any rhinorrhea,epistaxis, sinus pain, mouth or dental problems, sore throat or hoarseness, or dysphagia  Pulmonary: Patient denies any cough, congestion, SOA, or wheezing  Cardiovascular: Patient denies any chest pain, dyspnea, palpitations, weakness, intolerance of exercise, varicosities, swelling of extremities, known murmur  Gastrointestinal:  Patient denies nausea, vomiting, diarrhea, constipation, loss  of appetite, change in appetite, dysphagia, gas, heartburn, melena, change in bowel habits, use of laxatives or other drugs to alter the function of the gastrointestinal tract.  Genital/Urinary: Patient denies dysuria, change in color of urine, change in frequency of urination, pain with urgency, incontinence, retention, or nocturia.  Musculoskeletal: Patient denies increased warmth; redness; or swelling of joints; limitation of function; deformity; crepitation: pain in a joint or an extremity, the neck, or the back, especially with movement.  Neurological: Patient denies dizziness, tremor, ataxia,  difficulty in speaking, change in speech, paresthesia, loss of sensation, seizures, syncope, changes in memory.  Endocrine system: Patient denies tremors, palpitations, intolerance of heat or cold, polyuria, polydipsia, polyphagia, diaphoresis, exophthalmos, or goiter.  Psychological: Patient denies thoughts/plans or harming self or other; depression,  insomnia, night terrors, tiffanie, memory loss, disorientation.  Skin: Patient denies any bruising, rashes, discoloration, pruritus, wounds, ulcers, decubiti, changes in the hair or nails  Hematopoietic: Patient denies history of spontaneous or excessive bleeding, epistaxis, hematuria, melena, fatigue, enlarged or tender lymph nodes, pallor, history of anemia.        Physical Exam:   Awake, A&O x3, affect normal, no acute distress  Ambulating with a limp due to knee pain  Knee ROM is limited due to pain (5-115)  Strength is 4/5 in the quad, hamstring and calf  Cap refill is normal, Sensation intact    Card:  RR, HD Stable  Pulm:  Regular breathing, no S.O.A  Abd:  Soft, NT, ND    Lab Results (last 24 hours)     ** No results found for the last 24 hours. **          Xr Knee 1 Or 2 View Left    Result Date: 9/26/2017  Narrative: LEFT KNEE, 2 VIEWS CHEST  HISTORY: Preop chest x-ray, knee pain.  LEFT KNEE: 2 views of the left knee demonstrate complete loss of joint space involving the medial compartment. There is mild-to-moderate sclerosis involving the articular surfaces and marginal osteophytes noted. Chondrocalcinosis is appreciated. There is no evidence of fracture. Small moderate fragments are noted anterior and lateral to the knee.  CHEST: 2 views of the chest demonstrates the heart to be within normal limits in size. There is no evidence of focal infiltrate, effusion or of congestive failure.  This report was finalized on 9/26/2017 4:53 PM by Dr. Lambert Holland MD.      Xr Chest Pa & Lateral    Result Date: 9/26/2017  Narrative: LEFT KNEE, 2 VIEWS CHEST  HISTORY:  Preop chest x-ray, knee pain.  LEFT KNEE: 2 views of the left knee demonstrate complete loss of joint space involving the medial compartment. There is mild-to-moderate sclerosis involving the articular surfaces and marginal osteophytes noted. Chondrocalcinosis is appreciated. There is no evidence of fracture. Small moderate fragments are noted anterior and lateral to the knee.  CHEST: 2 views of the chest demonstrates the heart to be within normal limits in size. There is no evidence of focal infiltrate, effusion or of congestive failure.  This report was finalized on 9/26/2017 4:53 PM by Dr. Lambert Holladn MD.          Assessment:  End-stage Primary left Knee Osteoarthritis    Plan:  Patient's pain is becoming disabling, despite extensive conservative treatment.  Radiographs reveal end-stage degenerative changes.  The risks of surgery, including, but not limited to, heart attack, stroke, dying, DVT, arthrofibrosis and infection were discussed.  The alternatives and benefits were also discussed.  All questions answered and the patient wishes to proceed with left total knee arthroplasty.

## 2017-10-10 NOTE — ANESTHESIA PREPROCEDURE EVALUATION
Anesthesia Evaluation     Patient summary reviewed and Nursing notes reviewed   NPO Solid Status: > 8 hours  NPO Liquid Status: > 2 hours     Airway   Mallampati: II  no difficulty expected  Dental - normal exam   (+) edentulous    Pulmonary - normal exam   (+) a smoker Former, COPD, sleep apnea on CPAP,   Cardiovascular - normal exam    ECG reviewed  Patient on routine beta blocker and Beta blocker given within 24 hours of surgery    (+) hypertension, past MI  >12 months, CAD, CABG > 6 Months,       Neuro/Psych  GI/Hepatic/Renal/Endo    (+)  GERD,     Musculoskeletal     Abdominal    Substance History      OB/GYN          Other   (+) arthritis   history of cancer                                    Anesthesia Plan    ASA 3     general and regional   (Adductor canal block Risks and benefits discussed including bleeding, infection,nerve damage, LA toxicity)  Anesthetic plan and risks discussed with patient.

## 2017-10-10 NOTE — PLAN OF CARE
Problem: Patient Care Overview (Adult)  Goal: Plan of Care Review  Outcome: Ongoing (interventions implemented as appropriate)    10/10/17 [id number]   Coping/Psychosocial Response Interventions   Plan Of Care Reviewed With patient   Patient Care Overview   Progress progress toward functional goals as expected   Outcome Evaluation   Outcome Summary/Follow up Plan PATIENT IS POST OP TODAY. PAIN WELL CONTROLLED BY BY MEDS TAKEN IN PACU AND BLOCK. NOTED WITH NAUSEA X 1 AND MEDICATED. PT EDUCATED REGARDING CPAP USE AND OXYGEN LEVELS. RT NOTIFIED TO SET UP CPAP.       Goal: Adult Individualization and Mutuality  Outcome: Ongoing (interventions implemented as appropriate)  Goal: Discharge Needs Assessment  Outcome: Ongoing (interventions implemented as appropriate)    Problem: Perioperative Period (Adult)  Goal: Signs and Symptoms of Listed Potential Problems Will be Absent or Manageable (Perioperative Period)  Outcome: Ongoing (interventions implemented as appropriate)    Problem: Knee Replacement, Total (Adult)  Goal: Signs and Symptoms of Listed Potential Problems Will be Absent or Manageable (Knee Replacement, Total)  Outcome: Ongoing (interventions implemented as appropriate)    Problem: Fall Risk (Adult)  Goal: Absence of Falls  Outcome: Ongoing (interventions implemented as appropriate)

## 2017-10-11 LAB
ANION GAP SERPL CALCULATED.3IONS-SCNC: 14.1 MMOL/L
BUN BLD-MCNC: 19 MG/DL (ref 8–23)
BUN/CREAT SERPL: 14.6 (ref 7–25)
CALCIUM SPEC-SCNC: 8.7 MG/DL (ref 8.6–10.5)
CHLORIDE SERPL-SCNC: 98 MMOL/L (ref 98–107)
CO2 SERPL-SCNC: 21.9 MMOL/L (ref 22–29)
CREAT BLD-MCNC: 1.3 MG/DL (ref 0.76–1.27)
GFR SERPL CREATININE-BSD FRML MDRD: 55 ML/MIN/1.73
GLUCOSE BLD-MCNC: 213 MG/DL (ref 65–99)
HCT VFR BLD AUTO: 39.8 % (ref 40.4–52.2)
HGB BLD-MCNC: 12.8 G/DL (ref 13.7–17.6)
POTASSIUM BLD-SCNC: 4.6 MMOL/L (ref 3.5–5.2)
SODIUM BLD-SCNC: 134 MMOL/L (ref 136–145)

## 2017-10-11 PROCEDURE — 97110 THERAPEUTIC EXERCISES: CPT

## 2017-10-11 PROCEDURE — 80048 BASIC METABOLIC PNL TOTAL CA: CPT | Performed by: ORTHOPAEDIC SURGERY

## 2017-10-11 PROCEDURE — 25010000002 KETOROLAC TROMETHAMINE PER 15 MG: Performed by: ORTHOPAEDIC SURGERY

## 2017-10-11 PROCEDURE — 63710000001 DIPHENHYDRAMINE PER 50 MG: Performed by: ORTHOPAEDIC SURGERY

## 2017-10-11 PROCEDURE — 85018 HEMOGLOBIN: CPT | Performed by: ORTHOPAEDIC SURGERY

## 2017-10-11 PROCEDURE — 97161 PT EVAL LOW COMPLEX 20 MIN: CPT

## 2017-10-11 PROCEDURE — 97150 GROUP THERAPEUTIC PROCEDURES: CPT

## 2017-10-11 PROCEDURE — 85014 HEMATOCRIT: CPT | Performed by: ORTHOPAEDIC SURGERY

## 2017-10-11 RX ADMIN — KETOROLAC TROMETHAMINE 15 MG: 30 INJECTION, SOLUTION INTRAMUSCULAR at 17:44

## 2017-10-11 RX ADMIN — MUPIROCIN: 20 OINTMENT TOPICAL at 22:14

## 2017-10-11 RX ADMIN — CARVEDILOL 6.25 MG: 6.25 TABLET, FILM COATED ORAL at 08:14

## 2017-10-11 RX ADMIN — CARVEDILOL 6.25 MG: 6.25 TABLET, FILM COATED ORAL at 22:13

## 2017-10-11 RX ADMIN — OXYCODONE HYDROCHLORIDE AND ACETAMINOPHEN 1 TABLET: 5; 325 TABLET ORAL at 06:02

## 2017-10-11 RX ADMIN — OXYCODONE HYDROCHLORIDE AND ACETAMINOPHEN 2 TABLET: 5; 325 TABLET ORAL at 14:29

## 2017-10-11 RX ADMIN — FERROUS SULFATE TAB 325 MG (65 MG ELEMENTAL FE) 325 MG: 325 (65 FE) TAB at 08:14

## 2017-10-11 RX ADMIN — CLINDAMYCIN PHOSPHATE 900 MG: 18 INJECTION, SOLUTION INTRAMUSCULAR; INTRAVENOUS at 00:23

## 2017-10-11 RX ADMIN — POTASSIUM CHLORIDE 10 MEQ: 750 CAPSULE, EXTENDED RELEASE ORAL at 08:13

## 2017-10-11 RX ADMIN — ESCITALOPRAM 10 MG: 10 TABLET, FILM COATED ORAL at 08:14

## 2017-10-11 RX ADMIN — OXYCODONE HYDROCHLORIDE AND ACETAMINOPHEN 1 TABLET: 5; 325 TABLET ORAL at 00:35

## 2017-10-11 RX ADMIN — OXYCODONE HYDROCHLORIDE AND ACETAMINOPHEN 2 TABLET: 5; 325 TABLET ORAL at 17:44

## 2017-10-11 RX ADMIN — OXYCODONE HYDROCHLORIDE AND ACETAMINOPHEN 2 TABLET: 5; 325 TABLET ORAL at 10:13

## 2017-10-11 RX ADMIN — DIAZEPAM 5 MG: 5 TABLET ORAL at 17:44

## 2017-10-11 RX ADMIN — GUAIFENESIN 1200 MG: 600 TABLET, EXTENDED RELEASE ORAL at 22:13

## 2017-10-11 RX ADMIN — ASPIRIN 325 MG: 325 TABLET, DELAYED RELEASE ORAL at 08:13

## 2017-10-11 RX ADMIN — OXYCODONE HYDROCHLORIDE AND ACETAMINOPHEN 2 TABLET: 5; 325 TABLET ORAL at 22:12

## 2017-10-11 RX ADMIN — SODIUM CHLORIDE 100 ML/HR: 4.5 INJECTION, SOLUTION INTRAVENOUS at 00:22

## 2017-10-11 RX ADMIN — GUAIFENESIN 1200 MG: 600 TABLET, EXTENDED RELEASE ORAL at 08:13

## 2017-10-11 RX ADMIN — CLINDAMYCIN PHOSPHATE 900 MG: 18 INJECTION, SOLUTION INTRAMUSCULAR; INTRAVENOUS at 06:02

## 2017-10-11 RX ADMIN — DIPHENHYDRAMINE HYDROCHLORIDE 50 MG: 25 CAPSULE ORAL at 23:59

## 2017-10-11 RX ADMIN — ASPIRIN 325 MG: 325 TABLET, DELAYED RELEASE ORAL at 17:44

## 2017-10-11 NOTE — THERAPY EVALUATION
Acute Care - Physical Therapy Initial Evaluation  [facility]     Patient Name: Ricky Nunez  : 1945  MRN: [id number]  Today's Date: 10/11/2017   Onset of Illness/Injury or Date of Surgery Date: 10/10/17     Referring Physician: Millie      Admit Date: 10/10/2017     Visit Dx:    ICD-10-CM ICD-9-CM   1. Difficulty walking R26.2 719.7     Patient Active Problem List   Diagnosis   • OA (osteoarthritis) of knee     Past Medical History:   Diagnosis Date   • Acid reflux    • Arthritis    • Cancer     SKIN   • COPD (chronic obstructive pulmonary disease)    • Coronary artery disease    • Heart attack    • Hypertension    • Knee pain     LEFT   • Seasonal allergies    • Sleep apnea     CPAP     Past Surgical History:   Procedure Laterality Date   • CHOLECYSTECTOMY OPEN     • CORONARY ARTERY BYPASS GRAFT     • KNEE SURGERY Left           PT ASSESSMENT (last 72 hours)      PT Evaluation       10/11/17 0815 10/10/17 184    Rehab Evaluation    Document Type evaluation  -EE     Subjective Information agree to therapy  -EE     Patient Effort, Rehab Treatment good  -EE     Symptoms Noted During/After Treatment none  -EE     General Information    Onset of Illness/Injury or Date of Surgery Date 10/10/17  -EE     Referring Physician Millie  -EE     General Observations Pt supine in bed in no acute distress  -EE     Pertinent History Of Current Problem s/p L TKA  -EE     Precautions/Limitations fall precautions  -EE     Prior Level of Function independent:  -EE     Equipment Currently Used at Home cane, straight  -EE     Plans/Goals Discussed With patient;agreed upon  -EE     Barriers to Rehab none identified  -EE     Living Environment    Lives With spouse  -EE     Living Arrangements house  -EE     Transportation Available  car;family or friend will provide  -KM    Clinical Impression    Patient/Family Goals Statement Go to rehab, improve walking  -EE     Criteria for Skilled Therapeutic Interventions Met  yes;treatment indicated  -EE     Pathology/Pathophysiology Noted (Describe Specifically for Each System) musculoskeletal  -EE     Impairments Found (describe specific impairments) gait, locomotion, and balance;ROM  -EE     Rehab Potential good, to achieve stated therapy goals  -EE     Pain Assessment    Pain Assessment 0-10  -EE     Pain Score 3  -EE     Pain Type Surgical pain  -EE     Pain Location Knee  -EE     Pain Orientation Left  -EE     Pain Intervention(s) Repositioned;Ambulation/increased activity;Cold applied  -EE     Response to Interventions tolerated  -EE     Cognitive Assessment/Intervention    Current Cognitive/Communication Assessment functional  -EE     Orientation Status oriented x 4  -EE     Follows Commands/Answers Questions 100% of the time  -EE     Personal Safety WNL/WFL  -EE     Personal Safety Interventions fall prevention program maintained;gait belt;muscle strengthening facilitated;nonskid shoes/slippers when out of bed;supervised activity  -EE     ROM (Range of Motion)    General ROM lower extremity range of motion deficits identified  -EE     General ROM Detail L knee impaired ~30%; all others grossly WFL  -EE     MMT (Manual Muscle Testing)    General MMT Assessment lower extremity strength deficits identified  -EE     General MMT Assessment Detail L quad 3/5; all others grossly WFL for age  -EE     Bed Mobility, Assessment/Treatment    Bed Mobility, Assistive Device bed rails;head of bed elevated  -EE     Bed Mob, Supine to Sit, [place] supervision required  -EE     Bed Mob, Sit to Supine, [place] not tested   up in chair  -EE     Transfer Assessment/Treatment    Transfers, Sit-Stand [place] minimum assist (75% patient effort);verbal cues required  -EE     Transfers, Stand-Sit [place] contact guard assist;verbal cues required  -EE     Transfers, Sit-Stand-Sit, Assist Device rolling walker  -EE     Transfer, Impairments strength decreased;ROM decreased;pain  -EE      Transfer, Comment verbal cues required for hand placement  -EE     Gait Assessment/Treatment    Gait, [place] Level contact guard assist;verbal cues required  -EE     Gait, Assistive Device rolling walker  -EE     Gait, Distance (Feet) 8  -EE     Gait, Gait Pattern Analysis swing-to gait  -EE     Gait, Gait Deviations forward flexed posture;ramon decreased;left:;antalgic;decreased heel strike;step length decreased;stride length decreased  -EE     Gait, Safety Issues step length decreased;balance decreased during turns  -EE     Gait, Impairments strength decreased;ROM decreased;pain  -EE     Gait, Comment Verbal cues for gait sequencing; pain limiting  -EE     Motor Skills/Interventions    Additional Documentation Balance Skills Training (Group)  -EE     Balance Skills Training    Sitting-Level of Assistance Independent  -EE     Standing-Level of Assistance Contact guard  -EE     Static Standing Balance Support assistive device  -EE     Gait Balance-Level of Assistance Contact guard  -EE     Gait Balance Support assistive device  -EE     Therapy Exercises    Exercise Protocols total knee  -EE     Total Knee Exercises left:;10 reps;completed protocol  -EE     Positioning and Restraints    Pre-Treatment Position in bed  -EE     Post Treatment Position chair  -EE     In Chair reclined;call light within reach;encouraged to call for assist;legs elevated   ice applied L knee  -EE       10/10/17 1801 10/10/17 1759    General Information    Equipment Currently Used at Home  cane, straight  -KM    Living Environment    Number of Stairs to Enter Home 12  -KM       10/10/17 1031       General Information    Equipment Currently Used at Home cane, straight  -DK     Living Environment    Lives With spouse  -DK     Living Arrangements house  -DK     Home Accessibility stairs to enter home  -DK     Number of Stairs to Enter Home 3  -DK     Stair Railings at Home outside, present at both sides  -DK     Type of  Financial/Environmental Concern none  -DK     Transportation Available car;family or friend will provide  -DK       User Key  (r) = Recorded By, (t) = Taken By, (c) = Cosigned By    Initials Name Provider Type    DK Sasha Ballard, RN Registered Nurse    EE Veronique Swenson, PT Physical Therapist    KYLE Horton RN Registered Nurse          Physical Therapy Education     Title: PT OT SLP Therapies (Active)     Topic: Physical Therapy (Active)     Point: Mobility training (Done)    Learning Progress Summary    Learner Readiness Method Response Comment Documented by Status   Patient Acceptance E,TB VU,NR  EE 10/11/17 0847 Done               Point: Home exercise program (Done)    Learning Progress Summary    Learner Readiness Method Response Comment Documented by Status   Patient Acceptance E,TB VU,NR  EE 10/11/17 0847 Done               Point: Body mechanics (Done)    Learning Progress Summary    Learner Readiness Method Response Comment Documented by Status   Patient Acceptance E,TB VU,NR  EE 10/11/17 0847 Done                      User Key     Initials Effective Dates Name Provider Type [facility] 12/01/15 -  Veronique Swenson, PT Physical Therapist PT                PT Recommendation and Plan  Anticipated Discharge Disposition: skilled nursing facility  Planned Therapy Interventions: balance training, bed mobility training, gait training, home exercise program, patient/family education, strengthening, ROM (Range of Motion), transfer training  PT Frequency: 2 times/day  Plan of Care Review  Plan Of Care Reviewed With: patient  Outcome Summary/Follow up Plan: Pt s/p L TKA presents with post op pain, weakness, impaired ROM, and impaired balance limiting mobility. Pt currently requiring assist of one and rwx for mobility; was independent w/cane prior to admission. Pt would benefit from PT to address above impairments and assist w/return to PLOF.           IP PT Goals       10/11/17 0847          Transfer Training PT LTG     Transfer Training PT LTG, Date Established 10/11/17  -EE      Transfer Training PT LTG, Time to Achieve 5 days  -EE      Transfer Training PT LTG, Activity Type all transfers  -EE      Transfer Training PT LTG, [place] Level supervision required  -EE      Transfer Training PT LTG, Assist Device walker, rolling  -EE      Gait Training PT LTG    Gait Training Goal PT LTG, Date Established 10/11/17  -EE      Gait Training Goal PT LTG, Time to Achieve 5 days  -EE      Gait Training Goal PT LTG, [place] Level contact guard assist  -EE      Gait Training Goal PT LTG, Assist Device walker, rolling  -EE      Gait Training Goal PT LTG, Distance to Achieve 75  -EE      Range of Motion PT LTG    Range of Motion Goal PT LTG, Date Established 10/11/17  -EE      Range of Motion Goal PT LTG, Time to Achieve 5 days  -EE      Range fo Motion Goal PT LTG, Joint L knee  -EE      Range of Motion Goal PT LTG, AROM Measure 0-90  -EE      Patient Education PT LTG    Patient Education PT LTG, Date Established 10/11/17  -EE      Patient Education PT LTG, Time to Achieve 5 days  -EE      Patient Education PT LTG, Education Type HEP  -EE      Patient Education PT LTG, Education Understanding demonstrate adequately  -EE        User Key  (r) = Recorded By, (t) = Taken By, (c) = Cosigned By    Initials Name Provider Type    EE Veronique Swenson, PT Physical Therapist                Outcome Measures       10/11/17 0800          How much help from another person do you currently need...    Turning from your back to your side while in flat bed without using bedrails? 4  -EE      Moving from lying on back to sitting on the side of a flat bed without bedrails? 3  -EE      Moving to and from a bed to a chair (including a wheelchair)? 3  -EE      Standing up from a chair using your arms (e.g., wheelchair, bedside chair)? 3  -EE      Climbing 3-5 steps with a railing? 2  -EE      To walk in hospital room? 3  -EE      AM-PAC 6 Clicks Score 18  -EE       Functional Assessment    Outcome Measure Options AM-PAC 6 Clicks Basic Mobility (PT)  -EE        User Key  (r) = Recorded By, (t) = Taken By, (c) = Cosigned By    Initials Name Provider Type    EE Veronique Swenson PT Physical Therapist           Time Calculation:         PT Charges       10/11/17 0849          Time Calculation    Start Time 0815  -EE      Stop Time 0840  -EE      Time Calculation (min) 25 min  -EE      PT Received On 10/11/17  -EE      PT - Next Appointment 10/11/17  -EE      PT Goal Re-Cert Due Date 10/16/17  -EE        User Key  (r) = Recorded By, (t) = Taken By, (c) = Cosigned By    Initials Name Provider Type    ASYA Swenson PT Physical Therapist          Therapy Charges for Today     Code Description Service Date Service Provider Modifiers Qty    55337631038 HC PT EVAL LOW COMPLEXITY 2 10/11/2017 Veronique Swenson, PT GP 1    75094442171 HC PT THER PROC EA 15 MIN 10/11/2017 Veronique Swenson PT GP 1          PT G-Codes  Outcome Measure Options: AM-PAC 6 Clicks Basic Mobility (PT)      Veronique Swenson PT  10/11/2017

## 2017-10-11 NOTE — DISCHARGE PLACEMENT REQUEST
"Alejandra Schofield (72 y.o. Male)     Date of Birth Social Security Number Address Home Phone MRN    1945  [address] [place] [address] [phone] [id number]    [employer]thod[employer] Marital Status          None        Admission Date Admission Type Admitting Provider Attending Provider Department, Room/Bed    10/10/17 Elective Maksim Pinto MD Goodin, Robert A, MD [address], P896/1    Discharge Date Discharge Disposition Discharge Destination                      Attending Provider: Maksim Pinto MD     Allergies:  No Known Allergies    Isolation:  None   Infection:  None   Code Status:  FULL    Ht:  69\" (175.3 cm)   Wt:  249 lb 11.2 oz (113 kg)    Admission Cmt:  None   Principal Problem:  None                Active Insurance as of 10/10/2017     Primary Coverage     Payor Plan Insurance Group Employer/Plan Group    MEDICARE MEDICARE A & B      Payor Plan Address Payor Plan Phone Number Effective From Effective To    [address] [phone] 4/1/2012     [place] [address]       Subscriber Name Subscriber Birth Date Member ID       ALEJANDRA SCHOFIELD 1945 [id number]           Secondary Coverage     Payor Plan Insurance Group Employer/Plan Group    [employer] BLUE CROSS [employer] BLUE CROSS BLUE [employer]IELD PPO [id number]     Payor Plan Address Payor Plan Phone Number Effective From Effective To    [address] [phone] 1/1/2017     [place] [address]       Subscriber Name Subscriber Birth Date Member ID       ALEJANDRA SCHOFIELD 1945 [id number]                 Emergency Contacts      (Rel.) Home Phone Work Phone Mobile Phone    Milla Schofield (Spouse) [phone] -- --            "

## 2017-10-11 NOTE — THERAPY TREATMENT NOTE
Acute Care - Physical Therapy Treatment Note  [facility]     Patient Name: Ricky Nunez  : 1945  MRN: [id number]  Today's Date: 10/11/2017  Onset of Illness/Injury or Date of Surgery Date: 10/10/17     Referring Physician: Millie    Admit Date: 10/10/2017    Visit Dx:    ICD-10-CM ICD-9-CM   1. Difficulty walking R26.2 719.7     Patient Active Problem List   Diagnosis   • OA (osteoarthritis) of knee               Adult Rehabilitation Note       10/11/17 1547          Rehab Assessment/Intervention    Discipline physical therapy assistant  -      Document Type therapy note (daily note)  -      Subjective Information agree to therapy;complains of;weakness;fatigue;pain;swelling  -      Precautions/Limitations fall precautions  -      Recorded by [GELY] Asia Maravilla PTA      Pain Assessment    Pain Assessment 0-10  -      Pain Score 5  -JM      Post Pain Score 7  -      Pain Type Surgical pain  -      Pain Location Knee  -      Pain Orientation Left  -      Pain Intervention(s) Medication (See MAR);Repositioned;Cold applied  -      Response to Interventions rachel  -      Recorded by [GELY] Asia Maravilla PTA      ROM (Range of Motion)    General ROM Detail -  -      Recorded by [GELY] Asia Maravilla PTA      Bed Mobility, Assessment/Treatment    Bed Mobility, Comment in chair  -      Recorded by [GELY] Asia Maravilla PTA      Transfer Assessment/Treatment    Transfers, Sit-Stand [place] minimum assist (75% patient effort);verbal cues required;nonverbal cues required (demo/gesture)  -      Transfers, Stand-Sit [place] contact guard assist;verbal cues required  -      Transfers, Sit-Stand-Sit, Assist Device rolling walker  -      Transfer, Impairments pain;strength decreased  -      Recorded by [GELY] Asia Maravilla PTA      Gait Assessment/Treatment    Gait, [place] Level contact guard assist;verbal cues required  -      Gait, Assistive Device rolling  walker  -JM      Gait, Distance (Feet) 30  -JM      Gait, Gait Deviations antalgic;ramon decreased;forward flexed posture;step length decreased;stride width increased  -JM      Recorded by [GELY] Asia Maravilla PTA      Therapy Exercises    Exercise Protocols total knee  -JM      Total Knee Exercises left:;15 reps;completed protocol   constant cues, but perf indep  -JM      Recorded by [GELY] Asia Maravilla PTA      Positioning and Restraints    Pre-Treatment Position sitting in chair/recliner  -JM      In Chair reclined;call light within reach;encouraged to call for assist  -JM      Recorded by [GELY] Asia Maravilla PTA        User Key  (r) = Recorded By, (t) = Taken By, (c) = Cosigned By    Initials Name Effective Dates    GELY Maravilla PTA 02/18/16 -                 IP PT Goals       10/11/17 0847          Transfer Training PT LTG    Transfer Training PT LTG, Date Established 10/11/17  -EE      Transfer Training PT LTG, Time to Achieve 5 days  -EE      Transfer Training PT LTG, Activity Type all transfers  -EE      Transfer Training PT LTG, [place] Level supervision required  -EE      Transfer Training PT LTG, Assist Device walker, rolling  -EE      Gait Training PT LTG    Gait Training Goal PT LTG, Date Established 10/11/17  -EE      Gait Training Goal PT LTG, Time to Achieve 5 days  -EE      Gait Training Goal PT LTG, [place] Level contact guard assist  -EE      Gait Training Goal PT LTG, Assist Device walker, rolling  -EE      Gait Training Goal PT LTG, Distance to Achieve 75  -EE      Range of Motion PT LTG    Range of Motion Goal PT LTG, Date Established 10/11/17  -EE      Range of Motion Goal PT LTG, Time to Achieve 5 days  -EE      Range fo Motion Goal PT LTG, Joint L knee  -EE      Range of Motion Goal PT LTG, AROM Measure 0-90  -EE      Patient Education PT LTG    Patient Education PT LTG, Date Established 10/11/17  -EE      Patient Education PT LTG, Time to Achieve 5 days  -EE       Patient Education PT LTG, Education Type HEP  -EE      Patient Education PT LTG, Education Understanding demonstrate adequately  -EE        User Key  (r) = Recorded By, (t) = Taken By, (c) = Cosigned By    Initials Name Provider Type    EE Veronique Swenson, KASSANDRA Physical Therapist          Physical Therapy Education     Title: PT OT SLP Therapies (Active)     Topic: Physical Therapy (Active)     Point: Mobility training (Done)    Learning Progress Summary    Learner Readiness Method Response Comment Documented by Status   Patient Acceptance E,TB VU,NR  EE 10/11/17 0847 Done               Point: Home exercise program (Done)    Learning Progress Summary    Learner Readiness Method Response Comment Documented by Status   Patient Acceptance E,TB VU,NR  EE 10/11/17 0847 Done               Point: Body mechanics (Done)    Learning Progress Summary    Learner Readiness Method Response Comment Documented by Status   Patient Acceptance E,TB VU,NR  EE 10/11/17 0847 Done                      User Key     Initials Effective Dates Name Provider Type Discipline    EE 12/01/15 -  Veronique Swenson PT Physical Therapist PT                    PT Recommendation and Plan  Anticipated Discharge Disposition: skilled nursing facility  Planned Therapy Interventions: balance training, bed mobility training, gait training, home exercise program, patient/family education, strengthening, ROM (Range of Motion), transfer training  PT Frequency: 2 times/day             Outcome Measures       10/11/17 0800          How much help from another person do you currently need...    Turning from your back to your side while in flat bed without using bedrails? 4  -EE      Moving from lying on back to sitting on the side of a flat bed without bedrails? 3  -EE      Moving to and from a bed to a chair (including a wheelchair)? 3  -EE      Standing up from a chair using your arms (e.g., wheelchair, bedside chair)? 3  -EE      Climbing 3-5 steps with a railing? 2  -EE       To walk in hospital room? 3  -EE      AM-PAC 6 Clicks Score 18  -EE      Functional Assessment    Outcome Measure Options AM-PAC 6 Clicks Basic Mobility (PT)  -EE        User Key  (r) = Recorded By, (t) = Taken By, (c) = Cosigned By    Initials Name Provider Type    ASYA Swenson PT Physical Therapist           Time Calculation:         PT Charges       10/11/17 1607 10/11/17 0849       Time Calculation    Start Time 1510  - 0815  -EE     Stop Time 1605  -JM 0840  -EE     Time Calculation (min) 55 min  - 25 min  -EE     PT Received On 10/11/17  - 10/11/17  -EE     PT - Next Appointment 10/12/17  - 10/11/17  -EE     PT Goal Re-Cert Due Date  10/16/17  -EE       User Key  (r) = Recorded By, (t) = Taken By, (c) = Cosigned By    Initials Name Provider Type    ASYA Swenson PT Physical Therapist    GELY Maravilla PTA Physical Therapy Assistant          Therapy Charges for Today     Code Description Service Date Service Provider Modifiers Qty    46163197985 HC PT THER PROC EA 15 MIN 10/11/2017 Asia Maravilla PTA GP 2    99029565302 HC PT THER PROC GROUP 10/11/2017 Asia Maravilla PTA GP 1          PT G-Codes  Outcome Measure Options: AM-PAC 6 Clicks Basic Mobility (PT)    Asia Maravilla PTA  10/11/2017

## 2017-10-11 NOTE — ANESTHESIA POSTPROCEDURE EVALUATION
"Patient: Ricky Nunez    Procedure Summary     Date Anesthesia Start Anesthesia Stop Room / Location    10/10/17 1341 1544  [facility] / [facility]       Procedure Diagnosis Surgeon Provider    LT TOTAL KNEE ARTHROPLASTY (Left Knee) No diagnosis on file. MD Lucille Ayala MD          Anesthesia Type: general, regional  Last vitals  BP        Temp        Pulse       Resp        SpO2          Post Anesthesia Care and Evaluation      Comments: Patient discharged before being evaluated by an Anesthesiologist. No apparent complications per the record.  This case was not medically directed. I am completing this chart for medical records purposes; I personally have no medical involvement with this patient.    /78 (BP Location: Right arm, Patient Position: Lying)  Pulse 74  Temp 36 °C (96.8 °F) (Oral)   Resp 16  Ht 69\" (175.3 cm)  Wt 249 lb 11.2 oz (113 kg)  SpO2 92%  BMI 36.87 kg/m2          "

## 2017-10-11 NOTE — PLAN OF CARE
Problem: Patient Care Overview (Adult)  Goal: Plan of Care Review  Outcome: Ongoing (interventions implemented as appropriate)    10/11/17 9314   Coping/Psychosocial Response Interventions   Plan Of Care Reviewed With patient   Patient Care Overview   Progress progress toward functional goals as expected   Outcome Evaluation   Outcome Summary/Follow up Plan PATIENT IS POD #!. AMBULATING WELL WITH PT AND DISTANCE INCREASED. ASSIST OF ONE AND THE WALKER. PAIN WELL CONTROLLED WITH PO PAIN MEDS. PT EDUCATED REGARDING CPAP USE AND CARE OF CPAP AS WELL AS NARCOTICS AND EFFECTS ON RESPIRATORY SYSTEM.       Goal: Adult Individualization and Mutuality  Outcome: Ongoing (interventions implemented as appropriate)  Goal: Discharge Needs Assessment  Outcome: Revised    Problem: Perioperative Period (Adult)  Goal: Signs and Symptoms of Listed Potential Problems Will be Absent or Manageable (Perioperative Period)  Outcome: Outcome(s) achieved Date Met:  10/11/17    Problem: Knee Replacement, Total (Adult)  Goal: Signs and Symptoms of Listed Potential Problems Will be Absent or Manageable (Knee Replacement, Total)  Outcome: Ongoing (interventions implemented as appropriate)    Problem: Fall Risk (Adult)  Goal: Absence of Falls  Outcome: Ongoing (interventions implemented as appropriate)

## 2017-10-11 NOTE — PLAN OF CARE
Problem: Patient Care Overview (Adult)  Goal: Plan of Care Review    10/11/17 0847   Coping/Psychosocial Response Interventions   Plan Of Care Reviewed With patient   Outcome Evaluation   Outcome Summary/Follow up Plan Pt s/p L TKA presents with post op pain, weakness, impaired ROM, and impaired balance limiting mobility. Pt currently requiring assist of one and rwx for mobility; was independent w/cane prior to admission. Pt would benefit from PT to address above impairments and assist w/return to PLOF.          Problem: Inpatient Physical Therapy  Goal: Transfer Training Goal 1 LTG- PT    10/11/17 0847   Transfer Training PT LTG   Transfer Training PT LTG, Date Established 10/11/17   Transfer Training PT LTG, Time to Achieve 5 days   Transfer Training PT LTG, Activity Type all transfers   Transfer Training PT LTG, [place] Level supervision required   Transfer Training PT LTG, Assist Device walker, rolling       Goal: Gait Training Goal LTG- PT    10/11/17 0847   Gait Training PT LTG   Gait Training Goal PT LTG, Date Established 10/11/17   Gait Training Goal PT LTG, Time to Achieve 5 days   Gait Training Goal PT LTG, [place] Level contact guard assist   Gait Training Goal PT LTG, Assist Device walker, rolling   Gait Training Goal PT LTG, Distance to Achieve 75       Goal: Range of Motion Goal LTG- PT    10/11/17 0847   Range of Motion PT LTG   Range of Motion Goal PT LTG, Date Established 10/11/17   Range of Motion Goal PT LTG, Time to Achieve 5 days   Range fo Motion Goal PT LTG, Joint L knee   Range of Motion Goal PT LTG, AROM Measure 0-90       Goal: Patient Education Goal LTG- PT    10/11/17 0847   Patient Education PT LTG   Patient Education PT LTG, Date Established 10/11/17   Patient Education PT LTG, Time to Achieve 5 days   Patient Education PT LTG, Education Type HEP   Patient Education PT LTG, Education Understanding demonstrate adequately

## 2017-10-11 NOTE — PLAN OF CARE
Problem: Patient Care Overview (Adult)  Goal: Plan of Care Review  Outcome: Ongoing (interventions implemented as appropriate)    10/11/17 0446   Coping/Psychosocial Response Interventions   Plan Of Care Reviewed With patient   Patient Care Overview   Progress improving   Outcome Evaluation   Outcome Summary/Follow up Plan pain well controlled with po pain meds. a/o. CPAP at night. ace and drain c/d/i. ambulates well with assist x 1 and walker. Patient has some sob when ambulating but resolves quickly. educated on importance of appropriate diet and medications r/t h/o heart disease and HTN       Goal: Adult Individualization and Mutuality  Outcome: Ongoing (interventions implemented as appropriate)    Problem: Perioperative Period (Adult)  Goal: Signs and Symptoms of Listed Potential Problems Will be Absent or Manageable (Perioperative Period)  Outcome: Ongoing (interventions implemented as appropriate)    Problem: Knee Replacement, Total (Adult)  Goal: Signs and Symptoms of Listed Potential Problems Will be Absent or Manageable (Knee Replacement, Total)  Outcome: Ongoing (interventions implemented as appropriate)    Problem: Fall Risk (Adult)  Goal: Identify Related Risk Factors and Signs and Symptoms  Outcome: Outcome(s) achieved Date Met:  10/11/17  Goal: Absence of Falls  Outcome: Ongoing (interventions implemented as appropriate)

## 2017-10-11 NOTE — PROGRESS NOTES
Discharge Planning Assessment  [facility]     Patient Name: Ricky Nunez  MRN: [id number]  Today's Date: 10/11/2017    Admit Date: 10/10/2017          Discharge Needs Assessment       10/11/17 1430    Living Environment    Lives With spouse    Living Arrangements house    Home Accessibility bed and bath on same level;stairs (2 railings present);stairs (1 railing present);stairs to enter home;stairs within home    Number of Stairs to Enter Home 12    Number of Stairs Within Home 13    Stair Railings at Home outside, present at both sides;inside, present on left side    Type of Financial/Environmental Concern none    Transportation Available car;family or friend will provide    Living Environment    Provides Primary Care For no one    Quality Of Family Relationships supportive    Able to Return to Prior Living Arrangements yes    Discharge Needs Assessment    Concerns To Be Addressed discharge planning concerns    Equipment Currently Used at Home cane, straight;shower chair    Equipment Needed After Discharge commode;walker, rolling    Discharge Facility/Level Of Care Needs nursing facility, skilled    Discharge Disposition skilled nursing facility            Discharge Plan       10/11/17 1432    Case Management/Social Work Plan    Plan Referral made to [facility].     Patient/Family In Agreement With Plan yes    Additional Comments Met with patient at bedside; explained role of CCP, verified facesheet, checked IMM and discussed discharge planning needs.  The patient plans to go to rehab at [place]- referral made to [facility].   The patient resides at home with his spouse who can not assist him much, he has a cane and shower jaden, has 12 steps to enter his home and then has a 2nd level inside the home.  The patient's PCP is Dc Bearden Head, pharmacy is [facility] on [address] and the patient denies any trouble affording his medications but stated that he does forget to take them at times.   The patient was encouraged to set alarms on his cell phone and associate the taking of his medication with eating meals.  The patient denies any HH history but states that he has been to [facility] and possibly [facility] in the past.  The patient states that his wife will transport him to [facility] upon d/c.  [facility] will follow up on referral made to [facility] for skilled care for the patient.  ANDREW Roberts        Discharge Placement     Facility/Agency Request Status Selected? Address Phone Number Fax Number    [facility] Pending - Request Sent     [address] [address] [phone]                Demographic Summary       10/11/17 1427    Referral Information    Admission Type inpatient    Arrived From home or self-care    Referral Source physician;nursing    Reason For Consult discharge planning    Record Reviewed medical record;history and physical    Primary Care Physician Information    Name cD Mora MD            Functional Status       10/11/17 1429    Functional Status Current    Ambulation 3-->assistive equipment and person    Transferring 3-->assistive equipment and person    Toileting 3-->assistive equipment and person    Bathing 3-->assistive equipment and person    Dressing 2-->assistive person    Eating 0-->independent    Communication 0-->understands/communicates without difficulty    Functional Status Prior    Ambulation 1-->assistive equipment    Transferring 0-->independent    Toileting 0-->independent    Bathing 0-->independent    Dressing 0-->independent    Eating 0-->independent    Communication 0-->understands/communicates without difficulty    Swallowing 0-->swallows foods/liquids without difficulty    IADL    Medications assistive person    Meal Preparation assistive equipment and person    Housekeeping assistive equipment and person    Laundry assistive equipment and person    Shopping assistive equipment and person    Oral  Care independent            Psychosocial     None            Abuse/Neglect     None            Legal     None            Substance Abuse     None            Patient Forms       10/11/17 1427    Patient Forms    Provider Choice List Delivered    Delivered to Patient    Method of delivery In person          ANDREW Beal

## 2017-10-12 LAB
HCT VFR BLD AUTO: 35.1 % (ref 40.4–52.2)
HGB BLD-MCNC: 11.1 G/DL (ref 13.7–17.6)

## 2017-10-12 PROCEDURE — 97110 THERAPEUTIC EXERCISES: CPT

## 2017-10-12 PROCEDURE — 85014 HEMATOCRIT: CPT | Performed by: ORTHOPAEDIC SURGERY

## 2017-10-12 PROCEDURE — 97150 GROUP THERAPEUTIC PROCEDURES: CPT

## 2017-10-12 PROCEDURE — 63710000001 DIPHENHYDRAMINE PER 50 MG: Performed by: ORTHOPAEDIC SURGERY

## 2017-10-12 PROCEDURE — 85018 HEMOGLOBIN: CPT | Performed by: ORTHOPAEDIC SURGERY

## 2017-10-12 RX ADMIN — GUAIFENESIN 1200 MG: 600 TABLET, EXTENDED RELEASE ORAL at 08:25

## 2017-10-12 RX ADMIN — FUROSEMIDE 40 MG: 40 TABLET ORAL at 08:25

## 2017-10-12 RX ADMIN — OXYCODONE HYDROCHLORIDE AND ACETAMINOPHEN 2 TABLET: 5; 325 TABLET ORAL at 16:32

## 2017-10-12 RX ADMIN — ASPIRIN 325 MG: 325 TABLET, DELAYED RELEASE ORAL at 17:37

## 2017-10-12 RX ADMIN — POTASSIUM CHLORIDE 10 MEQ: 750 CAPSULE, EXTENDED RELEASE ORAL at 08:25

## 2017-10-12 RX ADMIN — OXYCODONE HYDROCHLORIDE AND ACETAMINOPHEN 2 TABLET: 5; 325 TABLET ORAL at 03:07

## 2017-10-12 RX ADMIN — OXYCODONE HYDROCHLORIDE AND ACETAMINOPHEN 2 TABLET: 5; 325 TABLET ORAL at 12:16

## 2017-10-12 RX ADMIN — SENNOSIDES AND DOCUSATE SODIUM 2 TABLET: 8.6; 5 TABLET ORAL at 11:21

## 2017-10-12 RX ADMIN — CARVEDILOL 6.25 MG: 6.25 TABLET, FILM COATED ORAL at 17:37

## 2017-10-12 RX ADMIN — SENNOSIDES AND DOCUSATE SODIUM 2 TABLET: 8.6; 5 TABLET ORAL at 17:37

## 2017-10-12 RX ADMIN — MUPIROCIN: 20 OINTMENT TOPICAL at 08:25

## 2017-10-12 RX ADMIN — OXYCODONE HYDROCHLORIDE AND ACETAMINOPHEN 2 TABLET: 5; 325 TABLET ORAL at 07:58

## 2017-10-12 RX ADMIN — BUMETANIDE 1 MG: 1 TABLET ORAL at 11:21

## 2017-10-12 RX ADMIN — OXYCODONE HYDROCHLORIDE AND ACETAMINOPHEN 2 TABLET: 5; 325 TABLET ORAL at 20:29

## 2017-10-12 RX ADMIN — FERROUS SULFATE TAB 325 MG (65 MG ELEMENTAL FE) 325 MG: 325 (65 FE) TAB at 08:25

## 2017-10-12 RX ADMIN — ATORVASTATIN CALCIUM 80 MG: 80 TABLET, FILM COATED ORAL at 20:29

## 2017-10-12 RX ADMIN — GUAIFENESIN 1200 MG: 600 TABLET, EXTENDED RELEASE ORAL at 17:37

## 2017-10-12 RX ADMIN — DIPHENHYDRAMINE HYDROCHLORIDE 50 MG: 25 CAPSULE ORAL at 10:02

## 2017-10-12 RX ADMIN — DOCUSATE SODIUM 100 MG: 100 CAPSULE, LIQUID FILLED ORAL at 07:58

## 2017-10-12 RX ADMIN — AMLODIPINE BESYLATE 10 MG: 10 TABLET ORAL at 08:25

## 2017-10-12 RX ADMIN — DIPHENHYDRAMINE HYDROCHLORIDE 50 MG: 25 CAPSULE ORAL at 18:05

## 2017-10-12 RX ADMIN — ESCITALOPRAM 10 MG: 10 TABLET, FILM COATED ORAL at 08:25

## 2017-10-12 RX ADMIN — ASPIRIN 325 MG: 325 TABLET, DELAYED RELEASE ORAL at 08:25

## 2017-10-12 RX ADMIN — CARVEDILOL 6.25 MG: 6.25 TABLET, FILM COATED ORAL at 08:25

## 2017-10-12 NOTE — THERAPY TREATMENT NOTE
Acute Care - Physical Therapy Treatment Note  [facility]     Patient Name: Ricky uNnez  : 1945  MRN: [id number]  Today's Date: 10/12/2017  Onset of Illness/Injury or Date of Surgery Date: 10/10/17     Referring Physician: Millie    Admit Date: 10/10/2017    Visit Dx:    ICD-10-CM ICD-9-CM   1. Difficulty walking R26.2 719.7     Patient Active Problem List   Diagnosis   • OA (osteoarthritis) of knee               Adult Rehabilitation Note       10/12/17 1251 10/11/17 1547       Rehab Assessment/Intervention    Discipline physical therapist  -MA physical therapy assistant  -JM     Document Type therapy note (daily note)  -MA therapy note (daily note)  -     Subjective Information agree to therapy;complains of;weakness;fatigue;pain  -MA agree to therapy;complains of;weakness;fatigue;pain;swelling  -     Patient Effort, Rehab Treatment good  -MA      Symptoms Noted During/After Treatment increased pain  -MA      Precautions/Limitations fall precautions  -MA fall precautions  -JM     Recorded by [MA] Codie Richardson, PT [JM] Asia Maravilla, HANNY     Pain Assessment    Pain Assessment 0-10  -MA 0-10  -JM     Pain Score 5  -MA 5  -JM     Post Pain Score  7  -JM     Pain Type Surgical pain  -MA Surgical pain  -JM     Pain Location Knee  -MA Knee  -     Pain Orientation Left  -MA Left  -JM     Pain Intervention(s) Repositioned;Ambulation/increased activity;Rest  -MA Medication (See MAR);Repositioned;Cold applied  -     Response to Interventions tolerated  -MA rachel  -JM     Recorded by [MA] Codie Richardson, PT [JM] Asia Maravilla PTA     Cognitive Assessment/Intervention    Current Cognitive/Communication Assessment functional  -MA      Orientation Status oriented x 4  -MA      Follows Commands/Answers Questions 100% of the time;able to follow multi-step instructions  -MA      Personal Safety WNL/WFL;good awareness, safety precautions  -MA      Personal Safety Interventions fall prevention  program maintained;gait belt;nonskid shoes/slippers when out of bed  -MA      Recorded by [MA] Codie Richardson, PT      ROM (Range of Motion)    General ROM Detail Not assessed this AM due to urgency to use bathroom  -MA -5-91  -JM     Recorded by [MA] Codie Richardson, PT [JM] Asia Maravilla PTA     Bed Mobility, Assessment/Treatment    Bed Mobility, Comment [facility]IC upon arrival to gym  -MA in chair  -JM     Recorded by [MA] Codie Richardson, PT [JM] Asia Maravilla PTA     Transfer Assessment/Treatment    Transfers, Sit-Stand [place] stand by assist  -MA minimum assist (75% patient effort);verbal cues required;nonverbal cues required (demo/gesture)  -     Transfers, Stand-Sit [place] stand by assist  -MA contact guard assist;verbal cues required  -     Transfers, Sit-Stand-Sit, Assist Device rolling walker  -MA rolling walker  -     Transfer, Impairments strength decreased;pain  -MA pain;strength decreased  -     Recorded by [MA] Codie Richardson, PT [JM] Asia Maravilla PTA     Gait Assessment/Treatment    Gait, [place] Level supervision required  -MA contact guard assist;verbal cues required  -     Gait, Assistive Device rolling walker  -MA rolling walker  -     Gait, Distance (Feet) 10  -MA 30  -JM     Gait, Gait Pattern Analysis swing-to gait  -MA      Gait, Gait Deviations antalgic;ramon decreased  -MA antalgic;ramon decreased;forward flexed posture;step length decreased;stride width increased  -     Gait, Safety Issues step length decreased  -MA      Gait, Impairments strength decreased;impaired balance;pain  -MA      Gait, Comment Did not further ambulation due to pain  -MA      Recorded by [MA] Codie Richardson, PT [JM] Asia Maravilla PTA     Therapy Exercises    Exercise Protocols total knee  -MA total knee  -JM     Total Knee Exercises left:;20 reps;completed protocol  -MA left:;15 reps;completed protocol   constant cues, but perf indep  -JM     Recorded by  [MA] Codie Richardson, PT [JM] Asia Maravilla, HANNY     Positioning and Restraints    Pre-Treatment Position sitting in chair/recliner  -MA sitting in chair/recliner  -GELY     Post Treatment Position chair  -MA      In Chair notified nsg;sitting;call light within reach;encouraged to call for assist;legs elevated  -MA reclined;call light within reach;encouraged to call for assist  -GELY     Recorded by [MA] Codie Richardson, PT [JM] Asia Maravilla PTA       User Key  (r) = Recorded By, (t) = Taken By, (c) = Cosigned By    Initials Name Effective Dates    GELY Maravilla, PTA 02/18/16 -     MA Codie Richardson, PT 12/13/16 -                 IP PT Goals       10/11/17 0847          Transfer Training PT LTG    Transfer Training PT LTG, Date Established 10/11/17  -EE      Transfer Training PT LTG, Time to Achieve 5 days  -EE      Transfer Training PT LTG, Activity Type all transfers  -EE      Transfer Training PT LTG, [place] Level supervision required  -EE      Transfer Training PT LTG, Assist Device walker, rolling  -EE      Gait Training PT LTG    Gait Training Goal PT LTG, Date Established 10/11/17  -EE      Gait Training Goal PT LTG, Time to Achieve 5 days  -EE      Gait Training Goal PT LTG, [place] Level contact guard assist  -EE      Gait Training Goal PT LTG, Assist Device walker, rolling  -EE      Gait Training Goal PT LTG, Distance to Achieve 75  -EE      Range of Motion PT LTG    Range of Motion Goal PT LTG, Date Established 10/11/17  -EE      Range of Motion Goal PT LTG, Time to Achieve 5 days  -EE      Range fo Motion Goal PT LTG, Joint L knee  -EE      Range of Motion Goal PT LTG, AROM Measure 0-90  -EE      Patient Education PT LTG    Patient Education PT LTG, Date Established 10/11/17  -EE      Patient Education PT LTG, Time to Achieve 5 days  -EE      Patient Education PT LTG, Education Type HEP  -EE      Patient Education PT LTG, Education Understanding demonstrate adequately  -EE         User Key  (r) = Recorded By, (t) = Taken By, (c) = Cosigned By    Initials Name Provider Type    EE Veronique Swenson, PT Physical Therapist          Physical Therapy Education     Title: PT OT SLP Therapies (Active)     Topic: Physical Therapy (Active)     Point: Mobility training (Done)    Learning Progress Summary    Learner Readiness Method Response Comment Documented by Status   Patient Acceptance E VU  MA 10/12/17 1256 Done    Eager E VU   10/11/17 1823 Done    Acceptance E,TB VU,NR  EE 10/11/17 0847 Done               Point: Home exercise program (Done)    Learning Progress Summary    Learner Readiness Method Response Comment Documented by Status   Patient Acceptance E VU  MA 10/12/17 1256 Done    Acceptance E,TB VU,NR  EE 10/11/17 0847 Done               Point: Body mechanics (Done)    Learning Progress Summary    Learner Readiness Method Response Comment Documented by Status   Patient Acceptance E,TB VU,NR  EE 10/11/17 0847 Done                      User Key     Initials Effective Dates Name Provider Type Discipline     12/01/15 -  Veronique Swenson, PT Physical Therapist PT     10/02/17 -  Estrella Horton, RN Registered Nurse Nurse    MA 12/13/16 -  Codie Richardson, KASSANDRA Physical Therapist PT                    PT Recommendation and Plan  Anticipated Discharge Disposition: skilled nursing facility  Planned Therapy Interventions: balance training, bed mobility training, gait training, home exercise program, patient/family education, strengthening, ROM (Range of Motion), transfer training  PT Frequency: 2 times/day  Plan of Care Review  Plan Of Care Reviewed With: (P) patient  Progress: (P) improving  Outcome Summary/Follow up Plan: (P) Improved tolerance to progression of exs protocol this date. Ambulated 10' with RW- limited due to pain. Will continue to address deficits as able. Anticipate home with  services tomorrow.          Outcome Measures       10/12/17 1200 10/11/17 0800       How much help from  another person do you currently need...    Turning from your back to your side while in flat bed without using bedrails? 3  -MA 4  -EE     Moving from lying on back to sitting on the side of a flat bed without bedrails? 3  -MA 3  -EE     Moving to and from a bed to a chair (including a wheelchair)? 3  -MA 3  -EE     Standing up from a chair using your arms (e.g., wheelchair, bedside chair)? 3  -MA 3  -EE     Climbing 3-5 steps with a railing? 2  -MA 2  -EE     To walk in hospital room? 3  -MA 3  -EE     AM-PAC 6 Clicks Score 17  -MA 18  -EE     Functional Assessment    Outcome Measure Options AM-PAC 6 Clicks Basic Mobility (PT)  -MA AM-PAC 6 Clicks Basic Mobility (PT)  -EE       User Key  (r) = Recorded By, (t) = Taken By, (c) = Cosigned By    Initials Name Provider Type    EE Veronique Swenson, PT Physical Therapist    ALEX Richardson PT Physical Therapist           Time Calculation:         PT Charges       10/12/17 1258          Time Calculation    Start Time 1047  -MA      Stop Time 1120  -MA      Time Calculation (min) 33 min  -MA      PT Received On 10/12/17  -MA      PT - Next Appointment 10/12/17  -MA        User Key  (r) = Recorded By, (t) = Taken By, (c) = Cosigned By    Initials Name Provider Type    ALEX Richardson PT Physical Therapist          Therapy Charges for Today     Code Description Service Date Service Provider Modifiers Qty    94116235794  PT THER PROC GROUP 10/12/2017 Codie Richardson, PT GP 1    24896182716 HC PT THER PROC EA 15 MIN 10/12/2017 Codie Richardson PT GP 1          PT G-Codes  Outcome Measure Options: AM-PAC 6 Clicks Basic Mobility (PT)    Codie Richardson PT  10/12/2017

## 2017-10-12 NOTE — PROGRESS NOTES
Continued Stay Note  [facility]     Patient Name: Ricky Nunez  MRN: [id number]  Today's Date: 10/12/2017    Admit Date: 10/10/2017          Discharge Plan       10/12/17 1644    Case Management/Social Work Plan    Plan Referral made to [facility]    Patient/Family In Agreement With Plan yes    Additional Comments Met with the patient and his wife at bedside to discuss an option for SNF placement due to [facility] not having a bed available for the patient.  The patient and his wife stated that they would like a referral made to [facility].  Erendira with [facility] spoke to the patient and his wife and stated that she would inform them if a bed became available. Referral for [facility] was made to [facility] and CCP will follow for eval for [facility] and will assist with patient's d/c to SNF.  ANDREW Roberts      10/12/17 1612    Case Management/Social Work Plan    Plan d/c home health facility     Additional Comments Met with patient and wife at bedside; explained role. CCP informed patient of no available beds at [facility]. Patient and wife expressed their frustration due to being pre-registered with [facility] and felt that they were guranteed a bed. CCP left HH/SNF list for second choice. of HH/SNF. CCP placed call to [facility]; Per Erendira, she will talk with patient and his wife. CCP will continue to follow for second preference for referral. Nhi FONSECAW               Discharge Codes     None            ANDREW Beal

## 2017-10-12 NOTE — PLAN OF CARE
Problem: Patient Care Overview (Adult)  Goal: Plan of Care Review  Outcome: Ongoing (interventions implemented as appropriate)    10/12/17 1256 10/12/17 3982   Coping/Psychosocial Response Interventions   Plan Of Care Reviewed With patient --    Patient Care Overview   Progress improving --    Outcome Evaluation   Outcome Summary/Follow up Plan --  POD 2-Patient awake and alert with evidence of confusion/memory issues. He is receiving percocet for pain and benadryl for itching. Patient does not have a dx of diabetes but blood sugar was elevated. Discussed the need to decrease sugar and carb intake to promote healing. He has a diagnosis of COPD. Stressed the importance of cough and deep breathing as well as use of spirometer       Goal: Adult Individualization and Mutuality  Outcome: Ongoing (interventions implemented as appropriate)    Problem: Fall Risk (Adult)  Goal: Absence of Falls  Outcome: Ongoing (interventions implemented as appropriate)

## 2017-10-12 NOTE — PLAN OF CARE
Problem: Patient Care Overview (Adult)  Goal: Plan of Care Review  Outcome: Ongoing (interventions implemented as appropriate)    10/12/17 0655   Coping/Psychosocial Response Interventions   Plan Of Care Reviewed With patient   Patient Care Overview   Progress progress toward functional goals as expected   Outcome Evaluation   Outcome Summary/Follow up Plan PO PAIN MEDICATION CONTROLLING PAIN. AMBULATING WITH 1 ASSIST. VSS. C-PAP AT BEDSIDE. EDUCATION PROVIDED ON SIGNS AND SYMPTOMS OF COPD LIKE WHEEZING , SOA, TACHPNEA, AND SWELLING IN LEGS , ANKLES. AND FEET       Goal: Adult Individualization and Mutuality  Outcome: Ongoing (interventions implemented as appropriate)  Goal: Discharge Needs Assessment  Outcome: Ongoing (interventions implemented as appropriate)    Problem: Knee Replacement, Total (Adult)  Goal: Signs and Symptoms of Listed Potential Problems Will be Absent or Manageable (Knee Replacement, Total)  Outcome: Ongoing (interventions implemented as appropriate)    Problem: Fall Risk (Adult)  Goal: Absence of Falls  Outcome: Ongoing (interventions implemented as appropriate)

## 2017-10-12 NOTE — PLAN OF CARE
Problem: Patient Care Overview (Adult)  Goal: Plan of Care Review    10/12/17 8925   Coping/Psychosocial Response Interventions   Plan Of Care Reviewed With patient   Patient Care Overview   Progress improving   Outcome Evaluation   Outcome Summary/Follow up Plan Improved tolerance to progression of exs protocol this date. Ambulated 10' with RW- limited due to pain. Will continue to address deficits as able. Anticipate home with  services tomorrow.

## 2017-10-12 NOTE — PROGRESS NOTES
Continued Stay Note  [facility]     Patient Name: Ricky Nunez  MRN: [id number]  Today's Date: 10/12/2017    Admit Date: 10/10/2017          Discharge Plan       10/12/17 1612    Case Management/Social Work Plan    Plan d/c rehab facility of patient's preference     Additional Comments Met with patient and wife at bedside; explained role. CCP informed patient of no available beds at [facility]. Patient and wife expressed their frustration due to being pre-registered with [facility] and felt that they were guranteed a bed. CCP left HH/SNF list for second choice. of HH/SNF. CCP placed call to [facility]; Per Erendira, she will talk with patient and his wife. CCP will continue to follow for second preference for referral. Nhi Stephen CCP CSW               Discharge Codes     None            ANDREW Long

## 2017-10-12 NOTE — PROGRESS NOTES
"Ortho POD 2    Patient Name:  Ricky Nunez  YOB: 1945  Medical Records Number:  [id number]    No complaints except pain    /71 (BP Location: Left arm)  Pulse 85  Temp 97.1 °F (36.2 °C) (Oral)   Resp 16  Ht 69\" (175.3 cm)  Wt 249 lb 11.2 oz (113 kg)  SpO2 95%  BMI 36.87 kg/m2    LLE:  NVI, calf nontender, sensation intact  No signs of DVT    Incision: clean, no infection    Lab Results (last 24 hours)     Procedure Component Value Units Date/Time    Hemoglobin & Hematocrit, Blood [[id number]]  (Abnormal) Collected:  10/12/17 0341    Specimen:  Blood Updated:  10/12/17 0507     Hemoglobin 11.1 (L) g/dL      Hematocrit 35.1 (L) %           S/p Left TKA  WBAT with walker  ASA for DVT prophylaxis  Rehab tomorrow  "

## 2017-10-12 NOTE — THERAPY TREATMENT NOTE
Acute Care - Physical Therapy Treatment Note  [facility]     Patient Name: Ricky Nunez  : 1945  MRN: [id number]  Today's Date: 10/12/2017  Onset of Illness/Injury or Date of Surgery Date: 10/10/17     Referring Physician: Millie    Admit Date: 10/10/2017    Visit Dx:    ICD-10-CM ICD-9-CM   1. Difficulty walking R26.2 719.7     Patient Active Problem List   Diagnosis   • OA (osteoarthritis) of knee               Adult Rehabilitation Note       10/12/17 1500 10/12/17 1251 10/11/17 1547    Rehab Assessment/Intervention    Discipline physical therapist  -MA physical therapist  -MA physical therapy assistant  -    Document Type therapy note (daily note)  -MA therapy note (daily note)  -MA therapy note (daily note)  -    Subjective Information agree to therapy;complains of;weakness;fatigue;pain   soreness from AM session  -MA agree to therapy;complains of;weakness;fatigue;pain  -MA agree to therapy;complains of;weakness;fatigue;pain;swelling  -JM    Patient Effort, Rehab Treatment good  -MA good  -MA     Symptoms Noted During/After Treatment increased pain   w exs protocol  -MA increased pain  -MA     Precautions/Limitations fall precautions  -MA fall precautions  -MA fall precautions  -JM    Recorded by [MA] Codie Richardson, PT [MA] Codie Richardson, PT [JM] Asia Maravilla, HANNY    Pain Assessment    Pain Assessment 0-10  -MA 0-10  -MA 0-10  -JM    Pain Score 7  -MA 5  -MA 5  -JM    Post Pain Score   7  -JM    Pain Type Acute pain;Surgical pain  -MA Surgical pain  -MA Surgical pain  -JM    Pain Location Knee  -MA Knee  -MA Knee  -JM    Pain Orientation Left  -MA Left  -MA Left  -JM    Pain Intervention(s) Repositioned;Ambulation/increased activity;Rest  -MA Repositioned;Ambulation/increased activity;Rest  -MA Medication (See MAR);Repositioned;Cold applied  -JM    Response to Interventions tolerated  -MA tolerated  -MA rachel  -JM    Recorded by [MA] Codie Richardson, PT [MA] Codie Richardson,  PT [JM] Asia Maravilla PTA    Vision Assessment/Intervention    Visual Impairment WFL with corrective lenses  -MA      Recorded by [MA] Codie Richardson PT      Cognitive Assessment/Intervention    Current Cognitive/Communication Assessment functional  -MA functional  -MA     Orientation Status oriented x 4  -MA oriented x 4  -MA     Follows Commands/Answers Questions 100% of the time;able to follow multi-step instructions  -% of the time;able to follow multi-step instructions  -MA     Personal Safety WNL/WFL;good awareness, safety precautions  -MA WNL/WFL;good awareness, safety precautions  -MA     Personal Safety Interventions fall prevention program maintained;gait belt;nonskid shoes/slippers when out of bed  -MA fall prevention program maintained;gait belt;nonskid shoes/slippers when out of bed  -MA     Recorded by [MA] Codie Richardson, PT [MA] Codie Richardson, PT     ROM (Range of Motion)    General ROM Detail L knee AROM 3-91'  -MA Not assessed this AM due to urgency to use bathroom  -MA -5-91  -    Recorded by [MA] Codie Richardson, PT [MA] Codie Richardson, PT [JM] Asia Maravilla PTA    Bed Mobility, Assessment/Treatment    Bed Mobility, Comment UIC upon arrival to gym  -MA UIC upon arrival to gym  -MA in chair  -    Recorded by [MA] Codie Richardson, PT [MA] Codie Richardson, PT [JM] Asia Maravilla PTA    Transfer Assessment/Treatment    Transfers, Sit-Stand [place] contact guard assist  -MA stand by assist  -MA minimum assist (75% patient effort);verbal cues required;nonverbal cues required (demo/gesture)  -    Transfers, Stand-Sit [place] stand by assist  -MA stand by assist  -MA contact guard assist;verbal cues required  -    Transfers, Sit-Stand-Sit, Assist Device rolling walker  -MA rolling walker  -MA rolling walker  -    Transfer, Impairments strength decreased;pain;impaired balance  -MA strength decreased;pain  -MA pain;strength decreased  -     Transfer, Comment VC for hand placement  -MA      Recorded by [MA] Codie Richardson PT [MA] Codie Richardson, PT [JM] Asia Maravilla, PTA    Gait Assessment/Treatment    Gait, [place] Level contact guard assist  -MA supervision required  -MA contact guard assist;verbal cues required  -JM    Gait, Assistive Device rolling walker  -MA rolling walker  -MA rolling walker  -JM    Gait, Distance (Feet) 30  -MA 10  -MA 30  -JM    Gait, Gait Pattern Analysis swing-to gait  -MA swing-to gait  -MA     Gait, Gait Deviations left:;antalgic;step length decreased;ramon decreased  -MA antalgic;ramon decreased  -MA antalgic;ramon decreased;forward flexed posture;step length decreased;stride width increased  -JM    Gait, Safety Issues sequencing ability decreased;step length decreased  -MA step length decreased  -MA     Gait, Impairments ROM decreased;strength decreased;impaired balance;pain  -MA strength decreased;impaired balance;pain  -MA     Gait, Comment VC for sequencing- improved carryover with step length  -MA Did not further ambulation due to pain  -MA     Recorded by [MA] Codie Richardson, PT [MA] Codie Richardson, PT [JM] Asia Maravilla, PTA    Therapy Exercises    Exercise Protocols total knee  -MA total knee  -MA total knee  -JM    Total Knee Exercises left:;25 reps;completed protocol  -MA left:;20 reps;completed protocol  -MA left:;15 reps;completed protocol   constant cues, but perf indep  -JM    Recorded by [MA] Codie Richardson PT [MA] Codie Richardson, PT [JM] Asia Maravilla, PTA    Positioning and Restraints    Pre-Treatment Position sitting in chair/recliner  -MA sitting in chair/recliner  -MA sitting in chair/recliner  -JM    Post Treatment Position chair  -MA chair  -MA     In Chair notified nsg;sitting;call light within reach;encouraged to call for assist;legs elevated  -MA notified nsg;sitting;call light within reach;encouraged to call for assist;legs elevated  -MA reclined;call  light within reach;encouraged to call for assist  -JM    Recorded by [MA] Codie Richardson, PT [MA] Codie Richardson, PT [GELY] Asia Maravilla, PTA      User Key  (r) = Recorded By, (t) = Taken By, (c) = Cosigned By    Initials Name Effective Dates    GELY Asia Maravilla, PTA 02/18/16 -     MA Codie Richardson, PT 12/13/16 -                 IP PT Goals       10/11/17 0847          Transfer Training PT LTG    Transfer Training PT LTG, Date Established 10/11/17  -EE      Transfer Training PT LTG, Time to Achieve 5 days  -EE      Transfer Training PT LTG, Activity Type all transfers  -EE      Transfer Training PT LTG, [place] Level supervision required  -EE      Transfer Training PT LTG, Assist Device walker, rolling  -EE      Gait Training PT LTG    Gait Training Goal PT LTG, Date Established 10/11/17  -EE      Gait Training Goal PT LTG, Time to Achieve 5 days  -EE      Gait Training Goal PT LTG, [place] Level contact guard assist  -EE      Gait Training Goal PT LTG, Assist Device walker, rolling  -EE      Gait Training Goal PT LTG, Distance to Achieve 75  -EE      Range of Motion PT LTG    Range of Motion Goal PT LTG, Date Established 10/11/17  -EE      Range of Motion Goal PT LTG, Time to Achieve 5 days  -EE      Range fo Motion Goal PT LTG, Joint L knee  -EE      Range of Motion Goal PT LTG, AROM Measure 0-90  -EE      Patient Education PT LTG    Patient Education PT LTG, Date Established 10/11/17  -EE      Patient Education PT LTG, Time to Achieve 5 days  -EE      Patient Education PT LTG, Education Type HEP  -EE      Patient Education PT LTG, Education Understanding demonstrate adequately  -EE        User Key  (r) = Recorded By, (t) = Taken By, (c) = Cosigned By    Initials Name Provider Type    EE Veronique Swenson PT Physical Therapist          Physical Therapy Education     Title: PT OT SLP Therapies (Active)     Topic: Physical Therapy (Active)     Point: Mobility training (Done)    Learning Progress  Summary    Learner Readiness Method Response Comment Documented by Status   Patient Acceptance E VU  MA 10/12/17 1256 Done    Eager E VU   10/11/17 1823 Done    Acceptance E,TB VU,NR  EE 10/11/17 0847 Done               Point: Home exercise program (Done)    Learning Progress Summary    Learner Readiness Method Response Comment Documented by Status   Patient Acceptance E VU  MA 10/12/17 1256 Done    Acceptance E,TB VU,NR  EE 10/11/17 0847 Done               Point: Body mechanics (Done)    Learning Progress Summary    Learner Readiness Method Response Comment Documented by Status   Patient Acceptance E,TB VU,NR  EE 10/11/17 0847 Done                      User Key     Initials Effective Dates Name Provider Type Discipline     12/01/15 -  Veronique Swenson, PT Physical Therapist PT     10/02/17 -  Estrella Horton, RN Registered Nurse Nurse    MA 12/13/16 -  Codie Richardson, PT Physical Therapist PT                    PT Recommendation and Plan  Anticipated Discharge Disposition: skilled nursing facility  Planned Therapy Interventions: balance training, bed mobility training, gait training, home exercise program, patient/family education, strengthening, ROM (Range of Motion), transfer training  PT Frequency: 2 times/day  Plan of Care Review  Plan Of Care Reviewed With: patient  Progress: improving  Outcome Summary/Follow up Plan: Improved tolerance to progression of exs protocol this date. Ambulated 10' with RW- limited due to pain. Will continue to address deficits as able. Anticipate home with  services tomorrow.          Outcome Measures       10/12/17 1200 10/11/17 0800       How much help from another person do you currently need...    Turning from your back to your side while in flat bed without using bedrails? 3  -MA 4  -EE     Moving from lying on back to sitting on the side of a flat bed without bedrails? 3  -MA 3  -EE     Moving to and from a bed to a chair (including a wheelchair)? 3  -MA 3  -EE      Standing up from a chair using your arms (e.g., wheelchair, bedside chair)? 3  -MA 3  -EE     Climbing 3-5 steps with a railing? 2  -MA 2  -EE     To walk in hospital room? 3  -MA 3  -EE     AM-PAC 6 Clicks Score 17  -MA 18  -EE     Functional Assessment    Outcome Measure Options AM-PAC 6 Clicks Basic Mobility (PT)  -MA AM-PAC 6 Clicks Basic Mobility (PT)  -EE       User Key  (r) = Recorded By, (t) = Taken By, (c) = Cosigned By    Initials Name Provider Type    ASYA Swenson, PT Physical Therapist    ALEX Richardson PT Physical Therapist           Time Calculation:         PT Charges       10/12/17 1512 10/12/17 1258       Time Calculation    Start Time 1416  -MA 1047  -MA     Stop Time 1512  -MA 1120  -MA     Time Calculation (min) 56 min  -MA 33 min  -MA     PT Received On 10/12/17  -MA 10/12/17  -MA     PT - Next Appointment 10/13/17  -MA 10/12/17  -MA       User Key  (r) = Recorded By, (t) = Taken By, (c) = Cosigned By    Initials Name Provider Type    ALEX Richardson PT Physical Therapist          Therapy Charges for Today     Code Description Service Date Service Provider Modifiers Qty    80422115838 HC PT THER PROC GROUP 10/12/2017 Codie Richardson, PT GP 1    40620087239 HC PT THER PROC EA 15 MIN 10/12/2017 Codie Richardson, PT GP 1    96799089119 HC PT THER PROC GROUP 10/12/2017 Codie Richardson, PT GP 1    54558647328 HC PT THER PROC EA 15 MIN 10/12/2017 Codie Richardson, PT GP 1          PT G-Codes  Outcome Measure Options: AM-PAC 6 Clicks Basic Mobility (PT)    Codie Richardson PT  10/12/2017

## 2017-10-13 VITALS
RESPIRATION RATE: 18 BRPM | WEIGHT: 249.7 LBS | HEIGHT: 69 IN | TEMPERATURE: 97 F | DIASTOLIC BLOOD PRESSURE: 67 MMHG | BODY MASS INDEX: 36.98 KG/M2 | OXYGEN SATURATION: 94 % | HEART RATE: 75 BPM | SYSTOLIC BLOOD PRESSURE: 152 MMHG

## 2017-10-13 LAB
HCT VFR BLD AUTO: 36.8 % (ref 40.4–52.2)
HGB BLD-MCNC: 11.5 G/DL (ref 13.7–17.6)

## 2017-10-13 PROCEDURE — 85014 HEMATOCRIT: CPT | Performed by: ORTHOPAEDIC SURGERY

## 2017-10-13 PROCEDURE — 85018 HEMOGLOBIN: CPT | Performed by: ORTHOPAEDIC SURGERY

## 2017-10-13 PROCEDURE — 63710000001 DIPHENHYDRAMINE PER 50 MG: Performed by: ORTHOPAEDIC SURGERY

## 2017-10-13 PROCEDURE — 97150 GROUP THERAPEUTIC PROCEDURES: CPT

## 2017-10-13 PROCEDURE — 97110 THERAPEUTIC EXERCISES: CPT

## 2017-10-13 RX ORDER — OXYCODONE HYDROCHLORIDE AND ACETAMINOPHEN 5; 325 MG/1; MG/1
1-2 TABLET ORAL EVERY 4 HOURS PRN
Qty: 80 TABLET | Refills: 0 | Status: SHIPPED | OUTPATIENT
Start: 2017-10-13 | End: 2019-09-17

## 2017-10-13 RX ORDER — PSEUDOEPHEDRINE HCL 30 MG
100 TABLET ORAL 2 TIMES DAILY PRN
Qty: 40 CAPSULE | Refills: 1 | Status: SHIPPED | OUTPATIENT
Start: 2017-10-13 | End: 2019-09-17

## 2017-10-13 RX ADMIN — POTASSIUM CHLORIDE 10 MEQ: 750 CAPSULE, EXTENDED RELEASE ORAL at 08:54

## 2017-10-13 RX ADMIN — DIPHENHYDRAMINE HYDROCHLORIDE 50 MG: 25 CAPSULE ORAL at 01:46

## 2017-10-13 RX ADMIN — ESCITALOPRAM 10 MG: 10 TABLET, FILM COATED ORAL at 08:54

## 2017-10-13 RX ADMIN — DIPHENHYDRAMINE HYDROCHLORIDE 50 MG: 25 CAPSULE ORAL at 10:30

## 2017-10-13 RX ADMIN — OXYCODONE HYDROCHLORIDE AND ACETAMINOPHEN 1 TABLET: 5; 325 TABLET ORAL at 10:30

## 2017-10-13 RX ADMIN — CARVEDILOL 6.25 MG: 6.25 TABLET, FILM COATED ORAL at 08:54

## 2017-10-13 RX ADMIN — AMLODIPINE BESYLATE 10 MG: 10 TABLET ORAL at 08:54

## 2017-10-13 RX ADMIN — FERROUS SULFATE TAB 325 MG (65 MG ELEMENTAL FE) 325 MG: 325 (65 FE) TAB at 08:54

## 2017-10-13 RX ADMIN — SENNOSIDES AND DOCUSATE SODIUM 2 TABLET: 8.6; 5 TABLET ORAL at 08:45

## 2017-10-13 RX ADMIN — BUMETANIDE 1 MG: 1 TABLET ORAL at 10:31

## 2017-10-13 RX ADMIN — OXYCODONE HYDROCHLORIDE AND ACETAMINOPHEN 1 TABLET: 5; 325 TABLET ORAL at 01:49

## 2017-10-13 RX ADMIN — FUROSEMIDE 40 MG: 40 TABLET ORAL at 08:54

## 2017-10-13 RX ADMIN — GUAIFENESIN 1200 MG: 600 TABLET, EXTENDED RELEASE ORAL at 08:54

## 2017-10-13 RX ADMIN — ASPIRIN 325 MG: 325 TABLET, DELAYED RELEASE ORAL at 08:54

## 2017-10-13 NOTE — DISCHARGE SUMMARY
Discharge Summary    Patient Name:  Ricky Nunez  YOB: 1945  Medical Records Number:  [id number]    Date of Admission:  10/10/2017  Date of Discharge:  10/13/2017    Primary Discharge Diagnosis:  OA (osteoarthritis) of knee [M17.10]    Secondary Discharge Diagnosis:    Problem List Items Addressed This Visit     None          Procedures Performed:  Left Total Knee Arthroplasty      Hospital Course:    Ricky Nunez is a 72 y.o.  male who underwent successful left tka on 10/10/2017.  Ricky Nunez was started on Aspirin 325mg po twice daily immediately post-operatively for DVT prophylaxis.  On post-op day 1 the patients dressing was changed and their incision was clean, with no signs of infection and their calf was soft, with no signs of DVT.  The patient progressed well with physical therapy and the patients hemoglobin remained stable. On post-operative day 3 the patient was felt ready for discharge.      Total Knee Joint Replacement Discharge Instructions:    I. ACTIVITIES:  1. Exercises:  ? Complete exercise program as taught by the hospital physical therapist 2 times per day  ? Exercise program will be advanced by the physical therapist  ? During the day be up ambulating every 2 hours (while awake) for short distances  ? Complete the ankle pump exercises at least 10 times per hour (while awake)  ? Elevate legs most of the day the first week post operatively and thereafter elevate legs when in bed and for at least 30 minutes during the day. Caution must be taken to avoid pillow placement under the bend of the knee as this can led to flexion contractures of the knee.  ? Use cold packs 20-30 minutes approximately 5 times per day. This should be done before and after completing your exercises and at any time you are experiencing pain/ stiffness in your operative extremity.      2. Activities of Daily Living:  ? No tub baths, hot tubs, or swimming pools for 4 weeks  ? May shower and let  water run over the incision on post-operative day #5 if no drainage. Do not scrub or rub the incision. Simply let the water run over the incision and pat dry.    II. Restrictions  ? Do not cross legs or kneel  ? Your surgeon will discuss with you when you will be able to drive again.  ? Weight bearing is as tolerated  ? First week stay inside on even terrain. May go up and down stairs one stair at a time utilizing the hand rail  ? After one week, you may venture outside.    III. Precautions:  ? Everyone that comes near you should wash their hands  ? No elective dental, genital-urinary, or colon procedures or surgical procedures for 12 weeks after surgery unless absolutely necessary.  ?  If dental work or surgical procedure is deemed absolutely necessary, you will need to contact your surgeon as you will need to take antibiotics 1 hour prior to any dental work (including teeth cleanings).  ? Please discuss with your surgeon prophylactic antibiotics as the length of time this intervention will be necessary for you varies with each patient’s health history and situation.  ? Avoid sick people. If you must be around someone who is ill, they should wear a mask.  ? Avoid visits to the Emergency Room or Urgent Care unless you are having a life threatening event.   ? If ordered stockings are to be placed on in the morning and removed at night. Monitor the stockings to ensure that any swelling is not causing the stockings to become too tight. In this case, remove stockings immediately.    IV. INCISION CARE:  ? Wash your hands prior to dressing changes  ? Change the dressing as needed to keep incision clean and dry. Utilize dry gauze and paper tape. Avoid touching the side of the gauze that goes against the incision with your hands.  ? No creams or ointments to the incision  ? May remove dressing once the incision is free of drainage  ? Do not touch or pick at the incision  ? Check incision every day and notify surgeon  immediately if any of the following signs or symptoms are noted:  o Increase in redness  o Increase in swelling around the incision and of the entire extremity  o Increase in pain  o Drainage oozing from the incision  o Pulling apart of the edges of the incision  o Increase in overall body temperature (greater than 100.5 degrees)  ? Your surgeon will instruct you regarding suture or staple removal    V. Medications:   1. Anticoagulants: You will be discharged on an anticoagulant. This is a prophylactic medication that helps prevent blood clots during your post-operative period. The type and length of dosage varies based on your individual needs, procedure performed, and surgeon’s preference.  ? While taking the anticoagulant, you should avoid taking any additional aspirin, ibuprofen (Advil or Motrin), Aleve (Naprosyn) or other non-steroidal anti-inflammatory medications.   ? Notify surgeon immediately if any falguni bleeding is noted in the urine, stool, emesis, or from the nose or the incision. Blood in the stool will often appear as black rather than red. Blood in urine may appear as pink. Blood in emesis may appear as brown/black like coffee grounds.  ? You will need to apply pressure for longer periods of time to any cuts or abrasions to stop bleeding  ? Avoid alcohol while taking anticoagulants    2. Stool Softeners: You will be at greater risk of constipation after surgery due to being less mobile and the pain medications.   ? Take stool softeners as instructed by your surgeon while on pain medications. Over the counter Colace 100 mg 1-2 capsules twice daily.   ? If stools become too loose or too frequent, please decreases the dosage or stop the stool softener.  ? If constipation occurs despite use of stool softeners, you are to continue the stool softeners and add a laxative (Milk of Magnesia 1 ounce daily as needed)  ? Drink plenty of fluids, and eat fruits and vegetables during your recovery time    3. Pain  Medications utilized after surgery are narcotics and the law requires that the following information be given to all patients that are prescribed narcotics:  ? CLASSIFICATION: Pain medications are called Opioids and are narcotics  ? LEGALITIES: It is illegal to share narcotics with others and to drive within 24 hours of taking narcotics  ? POTENTIAL SIDE EFFECTS: Potential side effects of opioids include: nausea, vomiting, itching, dizziness, drowsiness, dry mouth, constipation, and difficulty urinating.  ? POTENTIAL ADVERSE EFFECTS:   o Opioid tolerance can develop with use of pain medications and this simply means that it requires more and more of the medication to control pain; however, this is seen more in patients that use opioids for longer periods of time.  o Opioid dependence can develop with use of Opioids and this simply means that to stop the medication can cause withdrawal symptoms; however, this is seen with patients that use Opioids for longer periods of time.  o Opioid addiction can develop with use of Opioids and the incidence of this is very unlikely in patients who take the medications as ordered and stop the medications as instructed.  o Opioid overdose can be dangerous, but is unlikely when the medication is taken as ordered and stopped when ordered. It is important not to mix opioids with alcohol or with and type of sedative such as Benadryl as this can lead to over sedation and respiratory difficulty.  ? DOSAGE:   o Pain medications will need to be taken consistently for the first week to decrease pain and promote adequate pain relief and participation in physical therapy.  o After the initial surgical pain begins to resolve, you may begin to decrease the pain medication. By the end of 6-8 weeks, you should be off of pain medications.  o Refills will not be given by the office during evening hours, on weekends, or after 6-8 weeks post-op.  o To seek refills on pain medications during the  initial 6 week post-operative period, you must call the office 48 hours in advance to request the refill. The office will then notify you when to  the prescription. DO NOT wait until you are out of the medication to request a refill.    V. FOLLOW-UP VISITS:  ? You will need to follow up in the office with your surgeon in 3 weeks. Please call this number [phone] to schedule this appointment.  If you have any concerns or suspected complications prior to your follow up visit, please call your surgeons office. Do not wait until your appointment time if you suspect complications. These will need to be addressed in the office promptly.      Discharge Medications:     1) Percocet 5/325 mg  1-2 po q 4-6 hours for pain control  2)  Aspirin 325 mg po twice daily for 2 weeks, then once daily for 4 weeks.    CC:Dc Mora MD:Maksim Pinto MD

## 2017-10-13 NOTE — PROGRESS NOTES
Continued Stay Note  [facility]     Patient Name: Ricky Nunez  MRN: [id number]  Today's Date: 10/13/2017    Admit Date: 10/10/2017          Discharge Plan       10/13/17 1151    Case Management/Social Work Plan    Plan [facility]     Additional Comments CCP spoke with Anjali, confirmed bed is available at [facility]. Anjali stated 30 day exemption was needed. 30 day placed in packet and sticky note created. RN notified to have MD sign 30 day. Patient informed and confirmed discharge to [facility]. CCP will continue to follow to assist with discharge to skilled bed at [facility] and transported by his wife. Nhi MORALES              Discharge Codes     None        Expected Discharge Date and Time     Expected Discharge Date Expected Discharge Time    Oct 13, 2017             ANDREW Long

## 2017-10-13 NOTE — PLAN OF CARE
Problem: Patient Care Overview (Adult)  Goal: Plan of Care Review  Outcome: Ongoing (interventions implemented as appropriate)    10/13/17 0327   Coping/Psychosocial Response Interventions   Plan Of Care Reviewed With patient   Patient Care Overview   Progress progress toward functional goals as expected   Outcome Evaluation   Outcome Summary/Follow up Plan PO PAIN MEDICATION CONTROLLING PAIN. AMBULATING WITH 1 ASSIST. VOIDING PER BRP AND URINAL. INCREASE BLOOD SUGAR BUT PATIENT STATES HE IS NOT DIABETIC. EDUCATION PROVIDED ON THE IMPORTANCE OF BLOOD PRESSURE MONITORING AND STRESS REDUCTION TO HELP CONTROL BP.        Goal: Adult Individualization and Mutuality  Outcome: Ongoing (interventions implemented as appropriate)  Goal: Discharge Needs Assessment  Outcome: Ongoing (interventions implemented as appropriate)    Problem: Knee Replacement, Total (Adult)  Goal: Signs and Symptoms of Listed Potential Problems Will be Absent or Manageable (Knee Replacement, Total)  Outcome: Ongoing (interventions implemented as appropriate)    Problem: Fall Risk (Adult)  Goal: Absence of Falls  Outcome: Ongoing (interventions implemented as appropriate)

## 2017-10-13 NOTE — PLAN OF CARE
Problem: Patient Care Overview (Adult)  Goal: Plan of Care Review    10/13/17 1226   Coping/Psychosocial Response Interventions   Plan Of Care Reviewed With patient   Patient Care Overview   Progress improving   Outcome Evaluation   Outcome Summary/Follow up Plan Improved tolerance to activity and progression of exercise protocol this date. Ambulated 35' with use of RW. 6/10 pain to report. Patient to discharge to rehab facility today to continue to address deficits.

## 2017-10-13 NOTE — PLAN OF CARE
Problem: Patient Care Overview (Adult)  Goal: Plan of Care Review  Outcome: Outcome(s) achieved Date Met:  10/13/17    10/13/17 0382   Coping/Psychosocial Response Interventions   Plan Of Care Reviewed With patient;spouse   Patient Care Overview   Progress progress toward functional goals as expected   Outcome Evaluation   Outcome Summary/Follow up Plan Patient is POD 3 L TKA. He is awake and alert. Pain well controlled with Percocet. He is taking benadryl due to itching with the Percocet. Discharge instructions reviewed, report called to Kathleen at [facility]. D/C med list faxed to [facility] per her request. Reviewed with patient to continue to monitor his sugar intake due to elevated glucose.. Instructed to continue to use his CPAP due to dx of sleep apnea         Problem: Knee Replacement, Total (Adult)  Goal: Signs and Symptoms of Listed Potential Problems Will be Absent or Manageable (Knee Replacement, Total)  Outcome: Outcome(s) achieved Date Met:  10/13/17    Problem: Fall Risk (Adult)  Goal: Absence of Falls  Outcome: Outcome(s) achieved Date Met:  10/13/17

## 2017-10-13 NOTE — THERAPY TREATMENT NOTE
Acute Care - Physical Therapy Treatment Note  [facility]     Patient Name: Ricky Nunez  : 1945  MRN: [id number]  Today's Date: 10/13/2017  Onset of Illness/Injury or Date of Surgery Date: 10/10/17     Referring Physician: Millie    Admit Date: 10/10/2017    Visit Dx:    ICD-10-CM ICD-9-CM   1. Difficulty walking R26.2 719.7     Patient Active Problem List   Diagnosis   • OA (osteoarthritis) of knee               Adult Rehabilitation Note       10/13/17 1200 10/12/17 1500 10/12/17 1251    Rehab Assessment/Intervention    Discipline physical therapist  -MA physical therapist  -MA physical therapist  -MA    Document Type therapy note (daily note)  -MA therapy note (daily note)  -MA therapy note (daily note)  -MA    Subjective Information agree to therapy;complains of;fatigue;pain  -MA agree to therapy;complains of;weakness;fatigue;pain   soreness from AM session  -MA agree to therapy;complains of;weakness;fatigue;pain  -MA    Patient Effort, Rehab Treatment good  -MA good  -MA good  -MA    Symptoms Noted During/After Treatment increased pain  -MA increased pain   w exs protocol  -MA increased pain  -MA    Precautions/Limitations fall precautions  -MA fall precautions  -MA fall precautions  -MA    Recorded by [MA] Codie Richardson, PT [MA] Codie Richardson, PT [MA] Codie Richardson, PT    Pain Assessment    Pain Assessment 0-10  -MA 0-10  -MA 0-10  -MA    Pain Score 6  -MA 7  -MA 5  -MA    Pain Type Acute pain;Surgical pain  -MA Acute pain;Surgical pain  -MA Surgical pain  -MA    Pain Location Knee  -MA Knee  -MA Knee  -MA    Pain Orientation Left  -MA Left  -MA Left  -MA    Pain Intervention(s) Cold applied;Repositioned;Ambulation/increased activity;Rest  -MA Repositioned;Ambulation/increased activity;Rest  -MA Repositioned;Ambulation/increased activity;Rest  -MA    Response to Interventions tolerated  -MA tolerated  -MA tolerated  -MA    Recorded by [MA] Codie Richardson, PT [MA] Codie HORNE  Dylan, PT [MA] Codie Richardson PT    Vision Assessment/Intervention    Visual Impairment WFL with corrective lenses  -MA WFL with corrective lenses  -MA     Recorded by [MA] Codie Richardson, PT [MA] Codie Richardson PT     Cognitive Assessment/Intervention    Current Cognitive/Communication Assessment functional  -MA functional  -MA functional  -MA    Orientation Status oriented x 4  -MA oriented x 4  -MA oriented x 4  -MA    Follows Commands/Answers Questions 100% of the time;able to follow multi-step instructions  -% of the time;able to follow multi-step instructions  -% of the time;able to follow multi-step instructions  -MA    Personal Safety WNL/WFL;good awareness, safety precautions  -MA WNL/WFL;good awareness, safety precautions  -MA WNL/WFL;good awareness, safety precautions  -MA    Personal Safety Interventions fall prevention program maintained;gait belt;nonskid shoes/slippers when out of bed  -MA fall prevention program maintained;gait belt;nonskid shoes/slippers when out of bed  -MA fall prevention program maintained;gait belt;nonskid shoes/slippers when out of bed  -MA    Recorded by [MA] Codie Richardson, PT [MA] Codie Richardson, PT [MA] Codie Richardson PT    ROM (Range of Motion)    General ROM Detail L knee AROM 8-89'  -MA L knee AROM 3-91'  -MA Not assessed this AM due to urgency to use bathroom  -MA    Recorded by [MA] Codie Richardson, PT [MA] Codie Richardson, PT [MA] Codie Richardson PT    Bed Mobility, Assessment/Treatment    Bed Mobility, Comment UIC  -MA UIC upon arrival to gym  -MA UIC upon arrival to gym  -MA    Recorded by [MA] Codie Richardson, PT [MA] Codie Richardson, PT [MA] Codie Richardson, PT    Transfer Assessment/Treatment    Transfers, Sit-Stand [place] contact guard assist  -MA contact guard assist  -MA stand by assist  -MA    Transfers, Stand-Sit [place] contact guard assist  -MA stand by assist  -MA stand by assist  -MA     Transfers, Sit-Stand-Sit, Assist Device rolling walker  -MA rolling walker  -MA rolling walker  -MA    Transfer, Impairments strength decreased;pain  -MA strength decreased;pain;impaired balance  -MA strength decreased;pain  -MA    Transfer, Comment  VC for hand placement  -MA     Recorded by [MA] Codie Richardson, PT [MA] Codie Richardson, PT [MA] Codie Richardson, PT    Gait Assessment/Treatment    Gait, [place] Level contact guard assist  -MA contact guard assist  -MA supervision required  -MA    Gait, Assistive Device rolling walker  -MA rolling walker  -MA rolling walker  -MA    Gait, Distance (Feet) 35  -MA 30  -MA 10  -MA    Gait, Gait Pattern Analysis swing-to gait  -MA swing-to gait  -MA swing-to gait  -MA    Gait, Gait Deviations left:;antalgic;ramon decreased;step length decreased  -MA left:;antalgic;step length decreased;ramon decreased  -MA antalgic;ramon decreased  -MA    Gait, Safety Issues sequencing ability decreased  -MA sequencing ability decreased;step length decreased  -MA step length decreased  -MA    Gait, Impairments ROM decreased;strength decreased;impaired balance;pain  -MA ROM decreased;strength decreased;impaired balance;pain  -MA strength decreased;impaired balance;pain  -MA    Gait, Comment  VC for sequencing- improved carryover with step length  -MA Did not further ambulation due to pain  -MA    Recorded by [MA] Codie Richardson, PT [MA] Codie Richardson, PT [MA] Codie Richardson, PT    Therapy Exercises    Exercise Protocols total knee  -MA total knee  -MA total knee  -MA    Total Knee Exercises left:;30 reps;completed protocol  -MA left:;25 reps;completed protocol  -MA left:;20 reps;completed protocol  -MA    Recorded by [MA] Codie Richardson, PT [MA] Codie Richardson, PT [MA] Codie Richardson, PT    Positioning and Restraints    Pre-Treatment Position sitting in chair/recliner  -MA sitting in chair/recliner  -MA sitting in chair/recliner  -MA    Post  Treatment Position chair  -MA chair  -MA chair  -MA    In Chair notified nsg;sitting;call light within reach;encouraged to call for assist;legs elevated  -MA notified nsg;sitting;call light within reach;encouraged to call for assist;legs elevated  -MA notified nsg;sitting;call light within reach;encouraged to call for assist;legs elevated  -MA    Recorded by [MA] Codie Richardson, PT [MA] Codie Richardson, PT [MA] Codie Richardson, PT      10/11/17 1545          Rehab Assessment/Intervention    Discipline physical therapy assistant  -      Document Type therapy note (daily note)  -      Subjective Information agree to therapy;complains of;weakness;fatigue;pain;swelling  -      Precautions/Limitations fall precautions  -      Recorded by [GELY] Asia Maravilla PTA      Pain Assessment    Pain Assessment 0-10  -      Pain Score 5  -JM      Post Pain Score 7  -JM      Pain Type Surgical pain  -      Pain Location Knee  -      Pain Orientation Left  -      Pain Intervention(s) Medication (See MAR);Repositioned;Cold applied  -      Response to Interventions rachel  -JM      Recorded by [JM] Asia Maravilla PTA      ROM (Range of Motion)    General ROM Detail -5-91  -JM      Recorded by [JM] Asia Maravilla PTA      Bed Mobility, Assessment/Treatment    Bed Mobility, Comment in chair  -      Recorded by [GELY] Asia Maravilla PTA      Transfer Assessment/Treatment    Transfers, Sit-Stand [place] minimum assist (75% patient effort);verbal cues required;nonverbal cues required (demo/gesture)  -      Transfers, Stand-Sit [place] contact guard assist;verbal cues required  -      Transfers, Sit-Stand-Sit, Assist Device rolling walker  -      Transfer, Impairments pain;strength decreased  -      Recorded by [JM] Asia Maravilla PTA      Gait Assessment/Treatment    Gait, [place] Level contact guard assist;verbal cues required  -      Gait, Assistive Device rolling walker  -       Gait, Distance (Feet) 30  -JM      Gait, Gait Deviations antalgic;ramon decreased;forward flexed posture;step length decreased;stride width increased  -JM      Recorded by [GELY] Asia Maravilla PTA      Therapy Exercises    Exercise Protocols total knee  -JM      Total Knee Exercises left:;15 reps;completed protocol   constant cues, but perf indep  -JM      Recorded by [GELY] Asia Maravilla PTA      Positioning and Restraints    Pre-Treatment Position sitting in chair/recliner  -JM      In Chair reclined;call light within reach;encouraged to call for assist  -JM      Recorded by [GELY] Asia Maravilla PTA        User Key  (r) = Recorded By, (t) = Taken By, (c) = Cosigned By    Initials Name Effective Dates    GELY Maravilla, HANNY 02/18/16 -     MA Codie Richardson, PT 12/13/16 -                 IP PT Goals       10/11/17 0847          Transfer Training PT LTG    Transfer Training PT LTG, Date Established 10/11/17  -EE      Transfer Training PT LTG, Time to Achieve 5 days  -EE      Transfer Training PT LTG, Activity Type all transfers  -EE      Transfer Training PT LTG, [place] Level supervision required  -EE      Transfer Training PT LTG, Assist Device walker, rolling  -EE      Gait Training PT LTG    Gait Training Goal PT LTG, Date Established 10/11/17  -EE      Gait Training Goal PT LTG, Time to Achieve 5 days  -EE      Gait Training Goal PT LTG, [place] Level contact guard assist  -EE      Gait Training Goal PT LTG, Assist Device walker, rolling  -EE      Gait Training Goal PT LTG, Distance to Achieve 75  -EE      Range of Motion PT LTG    Range of Motion Goal PT LTG, Date Established 10/11/17  -EE      Range of Motion Goal PT LTG, Time to Achieve 5 days  -EE      Range fo Motion Goal PT LTG, Joint L knee  -EE      Range of Motion Goal PT LTG, AROM Measure 0-90  -EE      Patient Education PT LTG    Patient Education PT LTG, Date Established 10/11/17  -EE      Patient Education PT LTG, Time to  Achieve 5 days  -EE      Patient Education PT LTG, Education Type HEP  -EE      Patient Education PT LTG, Education Understanding demonstrate adequately  -EE        User Key  (r) = Recorded By, (t) = Taken By, (c) = Cosigned By    Initials Name Provider Type    EE Veronique Swenson, PT Physical Therapist          Physical Therapy Education     Title: PT OT SLP Therapies (Active)     Topic: Physical Therapy (Active)     Point: Mobility training (Done)    Learning Progress Summary    Learner Readiness Method Response Comment Documented by Status   Patient Acceptance E VU  MA 10/12/17 1256 Done    Eager E VU   10/11/17 1823 Done    Acceptance E,TB VU,NR  EE 10/11/17 0847 Done               Point: Home exercise program (Done)    Learning Progress Summary    Learner Readiness Method Response Comment Documented by Status   Patient Acceptance E VU  MA 10/12/17 1256 Done    Acceptance E,TB VU,NR  EE 10/11/17 0847 Done               Point: Body mechanics (Done)    Learning Progress Summary    Learner Readiness Method Response Comment Documented by Status   Patient Acceptance E,TB VU,NR  EE 10/11/17 0847 Done                      User Key     Initials Effective Dates Name Provider Type Discipline     12/01/15 -  Veronique Swenson, PT Physical Therapist PT     10/02/17 -  Estrella Horton, RN Registered Nurse Nurse    MA 12/13/16 -  Codie Richardson, PT Physical Therapist PT                    PT Recommendation and Plan  Anticipated Discharge Disposition: inpatient rehabilitation facility  Planned Therapy Interventions: balance training, bed mobility training, gait training, home exercise program, patient/family education, strengthening, ROM (Range of Motion), transfer training  PT Frequency: 2 times/day  Plan of Care Review  Plan Of Care Reviewed With: (P) patient  Progress: (P) improving  Outcome Summary/Follow up Plan: (P) Improved tolerance to activity and progression of exercise protocol this date. Ambulated 35' with use of RW.  6/10 pain to report. Patient to discharge to rehab facility today to continue to address deficits.          Outcome Measures       10/13/17 1200 10/12/17 1200 10/11/17 0800    How much help from another person do you currently need...    Turning from your back to your side while in flat bed without using bedrails? 4  -MA 3  -MA 4  -EE    Moving from lying on back to sitting on the side of a flat bed without bedrails? 3  -MA 3  -MA 3  -EE    Moving to and from a bed to a chair (including a wheelchair)? 3  -MA 3  -MA 3  -EE    Standing up from a chair using your arms (e.g., wheelchair, bedside chair)? 3  -MA 3  -MA 3  -EE    Climbing 3-5 steps with a railing? 2  -MA 2  -MA 2  -EE    To walk in hospital room? 3  -MA 3  -MA 3  -EE    AM-PAC 6 Clicks Score 18  -MA 17  -MA 18  -EE    Functional Assessment    Outcome Measure Options AM-PAC 6 Clicks Basic Mobility (PT)  -MA AM-PAC 6 Clicks Basic Mobility (PT)  -MA AM-PAC 6 Clicks Basic Mobility (PT)  -EE      User Key  (r) = Recorded By, (t) = Taken By, (c) = Cosigned By    Initials Name Provider Type    ASYA Swenson, PT Physical Therapist    ALEX Richardson PT Physical Therapist           Time Calculation:         PT Charges       10/13/17 1227          Time Calculation    Start Time 0912  -MA      Stop Time 0948  -MA      Time Calculation (min) 36 min  -MA      PT Received On 10/13/17  -MA        User Key  (r) = Recorded By, (t) = Taken By, (c) = Cosigned By    Initials Name Provider Type    ALEX Richardson PT Physical Therapist          Therapy Charges for Today     Code Description Service Date Service Provider Modifiers Qty    10367913319 HC PT THER PROC GROUP 10/12/2017 Codie Richardson PT GP 1    97716381326 HC PT THER PROC EA 15 MIN 10/12/2017 Codie Richardson PT GP 1    53679928852 HC PT THER PROC GROUP 10/12/2017 Codie Richardson PT GP 1    20585492195 HC PT THER PROC EA 15 MIN 10/12/2017 Codie Richardson PT GP 1    [id number]  PT  THER PROC GROUP 10/13/2017 Codie Richardson, PT GP 1    [id number]  PT THER PROC EA 15 MIN 10/13/2017 Codie Richardson, PT GP 1          PT G-Codes  Outcome Measure Options: AM-PAC 6 Clicks Basic Mobility (PT)    Codie Richardson, PT  10/13/2017

## 2017-10-16 NOTE — PROGRESS NOTES
Case Management Discharge Note    Final Note: CCP spoke with Anjali and confirmed patient's arrival to [facility]. Nhi Khanna CCP     Discharge Placement     Facility/Agency Request Status Selected? Address Phone Number Fax Number    [facility] Accept[facility]    Yes [address] [address] [phone]    [facility]     [address] [facility] [address] [phone]        Other: Other (car)    Discharge Codes: 03  Discharged/transferred to skilled nursing facility (SNF) with Medicare certification in anticipation of skilled care

## 2018-07-13 ENCOUNTER — HOSPITAL ENCOUNTER (OUTPATIENT)
Dept: CARDIOLOGY | Facility: HOSPITAL | Age: 73
Discharge: HOME | End: 2018-07-13
Attending: INTERNAL MEDICINE | Admitting: INTERNAL MEDICINE

## 2018-07-23 ENCOUNTER — HOSPITAL ENCOUNTER (OUTPATIENT)
Dept: CARDIOLOGY | Facility: HOSPITAL | Age: 73
Discharge: HOME | End: 2018-07-23
Attending: INTERNAL MEDICINE | Admitting: INTERNAL MEDICINE

## 2019-08-12 RX ORDER — BUMETANIDE 1 MG/1
TABLET ORAL
Qty: 30 TABLET | Refills: 0 | Status: SHIPPED | OUTPATIENT
Start: 2019-08-12 | End: 2019-08-29 | Stop reason: SDUPTHER

## 2019-08-29 RX ORDER — BUMETANIDE 1 MG/1
TABLET ORAL
Qty: 30 TABLET | Refills: 0 | Status: SHIPPED | OUTPATIENT
Start: 2019-08-29 | End: 2019-09-28 | Stop reason: SDUPTHER

## 2019-09-17 ENCOUNTER — OFFICE VISIT (OUTPATIENT)
Dept: CARDIOLOGY | Facility: CLINIC | Age: 74
End: 2019-09-17

## 2019-09-17 VITALS
RESPIRATION RATE: 18 BRPM | BODY MASS INDEX: 35.56 KG/M2 | HEIGHT: 70 IN | DIASTOLIC BLOOD PRESSURE: 70 MMHG | WEIGHT: 248.4 LBS | SYSTOLIC BLOOD PRESSURE: 153 MMHG | OXYGEN SATURATION: 94 % | HEART RATE: 59 BPM

## 2019-09-17 DIAGNOSIS — I20.8 STABLE ANGINA PECTORIS (HCC): ICD-10-CM

## 2019-09-17 DIAGNOSIS — R06.02 SHORTNESS OF BREATH: Primary | ICD-10-CM

## 2019-09-17 DIAGNOSIS — I10 ESSENTIAL HYPERTENSION: ICD-10-CM

## 2019-09-17 DIAGNOSIS — E78.2 MIXED HYPERLIPIDEMIA: ICD-10-CM

## 2019-09-17 DIAGNOSIS — I25.118 CORONARY ARTERY DISEASE OF NATIVE ARTERY OF NATIVE HEART WITH STABLE ANGINA PECTORIS (HCC): ICD-10-CM

## 2019-09-17 PROCEDURE — 93000 ELECTROCARDIOGRAM COMPLETE: CPT | Performed by: INTERNAL MEDICINE

## 2019-09-17 PROCEDURE — 99214 OFFICE O/P EST MOD 30 MIN: CPT | Performed by: INTERNAL MEDICINE

## 2019-09-17 RX ORDER — ASPIRIN 81 MG/1
81 TABLET ORAL DAILY
COMMUNITY

## 2019-09-17 NOTE — PROGRESS NOTES
Subjective:     Encounter Date:09/17/2019      Patient ID: Ricky Nunez is a 74 y.o. male.    Chief Complaint:      Dear Dr. Mora,    It is my pleasure seeing patient Ricky Nunez  in the office today.  he is a pleasant 74-year-old  white male with a past medical history that is significant for history of hypertension and hyperlipidemia history of acute myocardial infarction cardiac catheterization showed evidence of a severe 2 vessel coronary artery disease with significant stenosis involving the diagonal branch of the LAD with subtotal occlusion that was the culprit lesion for the acute myocardial infarction successfully treated with a placement of a drug eluting stent. Repeat Cardiac catheterization at that time showed evidence of residual disease involving the LAD and also a right coronary artery, His IVUS evaluation of the LAD showed a significant stenosis patient underwent a successful coronary artery bypass surgery patient did very well.     Complaining of symptoms of shortness of breath and dyspnea on exertion which are concerning for angina equal and  Symptoms have been progressively getting worse     echocardiogram with normal LV systolic function and diastolic dysfunction  Schedule patient for a stress test to rule out underlying ischemic heart disease contributing to patient's symptoms  Patient continued to lose weight   continue regular exercise   continue current medical therapy and regular exercise   need for weight loss discussed with the patient     labs with primary care physician office   follow up in office in 3 months          The following portions of the patient's history were reviewed and updated as appropriate: allergies, current medications, past family history, past medical history, past social history, past surgical history and problem list.    No Known Allergies      Current Outpatient Medications:   •  albuterol (PROVENTIL HFA;VENTOLIN HFA) 108 (90 Base) MCG/ACT inhaler,  Inhale 2 puffs Every 4 (Four) Hours As Needed for Wheezing., Disp: , Rfl:   •  ALPRAZolam (XANAX) 0.25 MG tablet, Take 0.25 mg by mouth 2 (Two) Times a Day As Needed for Anxiety., Disp: , Rfl:   •  amLODIPine (NORVASC) 10 MG tablet, Take 10 mg by mouth Daily., Disp: , Rfl:   •  aspirin 81 MG EC tablet, Take 81 mg by mouth Daily., Disp: , Rfl:   •  atorvastatin (LIPITOR) 80 MG tablet, Take 80 mg by mouth Every Night., Disp: , Rfl:   •  bumetanide (BUMEX) 1 MG tablet, TAKE 1 TABLET BY MOUTH EVERY DAY, Disp: 30 tablet, Rfl: 0  •  carvedilol (COREG) 6.25 MG tablet, Take 6.25 mg by mouth 2 (Two) Times a Day With Meals., Disp: , Rfl:   •  escitalopram (LEXAPRO) 10 MG tablet, Take 10 mg by mouth Daily., Disp: , Rfl:   •  metFORMIN (GLUCOPHAGE) 1000 MG tablet, TK 1 T PO  BID WITH MEALS, Disp: , Rfl: 6    Family History   Problem Relation Age of Onset   • Heart disease Mother    • Malig Hyperthermia Neg Hx        Past Medical History:   Diagnosis Date   • Acid reflux    • Anxiety    • Arthritis    • Cancer (CMS/HCC)     SKIN   • COPD (chronic obstructive pulmonary disease) (CMS/HCC)    • Coronary artery disease    • Depression    • Heart attack (CMS/HCC)    • Hyperlipidemia    • Hypertension    • Knee pain     LEFT   • Seasonal allergies    • Sleep apnea     CPAP       Past Surgical History:   Procedure Laterality Date   • CHOLECYSTECTOMY OPEN     • CORONARY ARTERY BYPASS GRAFT  2015   • KNEE SURGERY Left    • IA TOTAL KNEE ARTHROPLASTY Left 10/10/2017    Procedure: LT TOTAL KNEE ARTHROPLASTY;  Surgeon: Maksim Pinto MD;  [facility]: [facility];  Service: Orthopedics       Social History     Socioeconomic History   • Marital status:      Spouse name: Not on file   • Number of children: Not on file   • Years of education: Not on file   • Highest education level: Not on file   Tobacco Use   • Smoking status: Former Smoker     Packs/day: 2.00     Years: 50.00     Pack years: 100.00     Types: Cigarettes     Last  attempt to quit: 9/26/2015     Years since quitting: 3.9   • Smokeless tobacco: Never Used   Substance and Sexual Activity   • Alcohol use: No   • Drug use: No   • Sexual activity: Defer       Review of Systems   Constitution: Negative for chills, decreased appetite and malaise/fatigue.   HENT: Negative for congestion.    Eyes: Negative for blurred vision and double vision.   Cardiovascular: Positive for chest pain and dyspnea on exertion. Negative for irregular heartbeat, leg swelling, near-syncope, orthopnea, palpitations, paroxysmal nocturnal dyspnea and syncope.   Respiratory: Negative for cough and shortness of breath.    Hematologic/Lymphatic: Negative for adenopathy. Does not bruise/bleed easily.   Skin: Negative for rash.   Gastrointestinal: Negative for bloating and abdominal pain.   Neurological: Negative for dizziness and focal weakness.            Objective:         Vitals:    09/17/19 1016   BP: 153/70   Pulse: 59   Resp: 18   SpO2: 94%       Physical Exam   Constitutional: He is oriented to person, place, and time. He appears well-developed and well-nourished.   HENT:   Head: Normocephalic and atraumatic.   Eyes: Conjunctivae are normal. Pupils are equal, round, and reactive to light.   Neck: Normal range of motion. Neck supple. No thyromegaly present.   Cardiovascular: Normal rate, regular rhythm, S1 normal, S2 normal and intact distal pulses. PMI is displaced.   Murmur heard.   Early systolic murmur is present with a grade of 2/6.  Pulmonary/Chest: Effort normal and breath sounds normal.   Abdominal: Soft. Bowel sounds are normal.   Musculoskeletal: He exhibits no edema.   Neurological: He is alert and oriented to person, place, and time.   Skin: Skin is warm.   Nursing note and vitals reviewed.        EKG: normal EKG, normal sinus rhythm, unchanged from previous tracings, normal sinus rhythm, nonspecific ST and T waves changes.

## 2019-09-30 ENCOUNTER — HOSPITAL ENCOUNTER (OUTPATIENT)
Dept: CARDIOLOGY | Facility: HOSPITAL | Age: 74
Discharge: HOME | End: 2019-09-30

## 2019-09-30 DIAGNOSIS — R06.02 SHORTNESS OF BREATH: ICD-10-CM

## 2019-09-30 DIAGNOSIS — I10 ESSENTIAL HYPERTENSION: ICD-10-CM

## 2019-09-30 PROCEDURE — A9500 TC99M SESTAMIBI: HCPCS | Performed by: INTERNAL MEDICINE

## 2019-09-30 PROCEDURE — 0 TECHNETIUM SESTAMIBI: Performed by: INTERNAL MEDICINE

## 2019-09-30 PROCEDURE — 93017 CV STRESS TEST TRACING ONLY: CPT

## 2019-09-30 PROCEDURE — 78452 HT MUSCLE IMAGE SPECT MULT: CPT

## 2019-09-30 PROCEDURE — 93016 CV STRESS TEST SUPVJ ONLY: CPT | Performed by: INTERNAL MEDICINE

## 2019-09-30 RX ORDER — BUMETANIDE 1 MG/1
TABLET ORAL
Qty: 30 TABLET | Refills: 0 | Status: SHIPPED | OUTPATIENT
Start: 2019-09-30 | End: 2019-10-28 | Stop reason: SDUPTHER

## 2019-09-30 RX ADMIN — TECHNETIUM TC 99M SESTAMIBI 1 DOSE: 1 INJECTION INTRAVENOUS at 09:04

## 2019-09-30 RX ADMIN — TECHNETIUM TC 99M SESTAMIBI 1 DOSE: 1 INJECTION INTRAVENOUS at 07:59

## 2019-10-03 LAB
BH CV NUCLEAR PRIOR STUDY: 3
BH CV STRESS BP STAGE 1: NORMAL
BH CV STRESS DURATION MIN STAGE 1: 3
BH CV STRESS DURATION MIN STAGE 2: 1
BH CV STRESS DURATION SEC STAGE 1: 0
BH CV STRESS DURATION SEC STAGE 2: 46
BH CV STRESS GRADE STAGE 1: 10
BH CV STRESS GRADE STAGE 2: 12
BH CV STRESS HR STAGE 1: 114
BH CV STRESS HR STAGE 2: 127
BH CV STRESS METS STAGE 2: 7
BH CV STRESS PROTOCOL 1: NORMAL
BH CV STRESS RECOVERY BP: NORMAL MMHG
BH CV STRESS RECOVERY HR: 112 BPM
BH CV STRESS SPEED STAGE 1: 1.7
BH CV STRESS SPEED STAGE 2: 2.5
BH CV STRESS STAGE 1: 1
BH CV STRESS STAGE 2: 2
LV EF NUC BP: 62 %
MAXIMAL PREDICTED HEART RATE: 148 BPM
PERCENT MAX PREDICTED HR: 85.81 %
STRESS BASELINE BP: NORMAL MMHG
STRESS BASELINE HR: 76 BPM
STRESS PERCENT HR: 101 %
STRESS POST ESTIMATED WORKLOAD: 7 METS
STRESS POST EXERCISE DUR MIN: 4 MIN
STRESS POST EXERCISE DUR SEC: 46 SEC
STRESS POST PEAK BP: NORMAL MMHG
STRESS POST PEAK HR: 127 BPM
STRESS TARGET HR: 126 BPM

## 2019-10-03 PROCEDURE — 78452 HT MUSCLE IMAGE SPECT MULT: CPT | Performed by: INTERNAL MEDICINE

## 2019-10-03 PROCEDURE — 93018 CV STRESS TEST I&R ONLY: CPT | Performed by: INTERNAL MEDICINE

## 2019-10-28 RX ORDER — BUMETANIDE 1 MG/1
TABLET ORAL
Qty: 30 TABLET | Refills: 6 | Status: SHIPPED | OUTPATIENT
Start: 2019-10-28 | End: 2020-06-01

## 2019-12-17 ENCOUNTER — OFFICE VISIT (OUTPATIENT)
Dept: CARDIOLOGY | Facility: CLINIC | Age: 74
End: 2019-12-17

## 2019-12-17 VITALS
DIASTOLIC BLOOD PRESSURE: 94 MMHG | BODY MASS INDEX: 36.51 KG/M2 | WEIGHT: 255 LBS | SYSTOLIC BLOOD PRESSURE: 127 MMHG | HEIGHT: 70 IN | RESPIRATION RATE: 18 BRPM | HEART RATE: 73 BPM

## 2019-12-17 DIAGNOSIS — I10 ESSENTIAL HYPERTENSION: ICD-10-CM

## 2019-12-17 DIAGNOSIS — I25.118 CORONARY ARTERY DISEASE OF NATIVE ARTERY OF NATIVE HEART WITH STABLE ANGINA PECTORIS (HCC): Primary | ICD-10-CM

## 2019-12-17 DIAGNOSIS — E78.2 MIXED HYPERLIPIDEMIA: ICD-10-CM

## 2019-12-17 PROCEDURE — 99214 OFFICE O/P EST MOD 30 MIN: CPT | Performed by: INTERNAL MEDICINE

## 2019-12-17 NOTE — PROGRESS NOTES
"Cardiology Office Visit      Encounter Date:  12/17/2019    Patient ID:   Ricky Nunez is a 74 y.o. male.    Reason For Followup:  Shortness of breath  Coronary artery disease  Hypertension    Brief Clinical History:  Dear Dr. Mora, Dc Bearden MD    I had the pleasure of seeing Ricky Nunez today. As you are well aware, this is a 74 y.o. male with a past medical history that is significant for history of hypertension and hyperlipidemia history of acute myocardial infarction cardiac catheterization showed evidence of a severe 2 vessel coronary artery disease with significant stenosis involving the diagonal branch of the LAD with subtotal occlusion that was the culprit lesion for the acute myocardial infarction successfully treated with a placement of a drug eluting stent. Repeat Cardiac catheterization at that time showed evidence of residual disease involving the LAD and also a right coronary artery, His IVUS evaluation of the LAD showed a significant stenosis patient underwent a successful coronary artery bypass surgery patient did very well.     Stress test with no evidence of any significant inducible ischemia  echocardiogram with normal LV systolic function and diastolic dysfunction    Interval History:  Still some shortness of breath and dyspnea on exertion    Assessment & Plan    Impressions:  Shortness of breath most likely diastolic dysfunction and obesity  Hypertension  Coronary artery disease  Prior coronary artery bypass surgery  Obesity  Obstructive sleep apnea    Recommendations:  Continued aggressive risk factor modification  Need for weight loss discussed with the patient   continue regular exercise   continue current medical therapy and regular exercise  Advised to get CBC CMP and lipids       follow up in office in 3 months    Objective:    Vitals:  Vitals:    12/17/19 0920   BP: 127/94   BP Location: Left arm   Pulse: 73   Resp: 18   Weight: 116 kg (255 lb)   Height: 177.8 cm (70\") "       Physical Exam:    General: Alert, cooperative, no distress, appears stated age/morbid obesity  Head:  Normocephalic, atraumatic, mucous membranes moist  Eyes:  Conjunctiva/corneas clear, EOM's intact     Neck:  Supple,  no adenopathy;      Lungs: Clear to auscultation bilaterally, no wheezes rhonchi rales are noted  Chest wall: No tenderness  Heart::  Regular rate and rhythm, S1 and S2 normal, no murmur, rub or gallop  Abdomen: Soft, non-tender, nondistended bowel sounds active  Extremities: No cyanosis, clubbing, or edema  Pulses: 2+ and symmetric all extremities  Skin:  No rashes or lesions  Neuro/psych: A&O x3. CN II through XII are grossly intact with appropriate affect      Allergies:  No Known Allergies    Medication Review:     Current Outpatient Medications:   •  albuterol (PROVENTIL HFA;VENTOLIN HFA) 108 (90 Base) MCG/ACT inhaler, Inhale 2 puffs Every 4 (Four) Hours As Needed for Wheezing., Disp: , Rfl:   •  ALPRAZolam (XANAX) 0.25 MG tablet, Take 0.25 mg by mouth 2 (Two) Times a Day As Needed for Anxiety., Disp: , Rfl:   •  amLODIPine (NORVASC) 10 MG tablet, Take 10 mg by mouth Daily., Disp: , Rfl:   •  aspirin 81 MG EC tablet, Take 81 mg by mouth Daily., Disp: , Rfl:   •  atorvastatin (LIPITOR) 80 MG tablet, Take 80 mg by mouth Every Night., Disp: , Rfl:   •  bumetanide (BUMEX) 1 MG tablet, TAKE 1 TABLET BY MOUTH EVERY DAY, Disp: 30 tablet, Rfl: 6  •  carvedilol (COREG) 6.25 MG tablet, Take 6.25 mg by mouth 2 (Two) Times a Day With Meals., Disp: , Rfl:   •  escitalopram (LEXAPRO) 10 MG tablet, Take 10 mg by mouth Daily., Disp: , Rfl:   •  metFORMIN (GLUCOPHAGE) 1000 MG tablet, TK 1 T PO  BID WITH MEALS, Disp: , Rfl: 6    Family History:  Family History   Problem Relation Age of Onset   • Heart disease Mother    • Malig Hyperthermia Neg Hx        Past Medical History:  Past Medical History:   Diagnosis Date   • Acid reflux    • Anxiety    • Arthritis    • Cancer (CMS/HCC)     SKIN   • COPD (chronic  obstructive pulmonary disease) (CMS/HCC)    • Coronary artery disease    • Depression    • Heart attack (CMS/HCC)    • Hyperlipidemia    • Hypertension    • Knee pain     LEFT   • Seasonal allergies    • Sleep apnea     CPAP       Past surgical History:  Past Surgical History:   Procedure Laterality Date   • CHOLECYSTECTOMY OPEN     • CORONARY ARTERY BYPASS GRAFT     • KNEE SURGERY Left    • AR TOTAL KNEE ARTHROPLASTY Left 10/10/2017    Procedure: LT TOTAL KNEE ARTHROPLASTY;  Surgeon: Maksim Pinto MD;  Location: [facility] OR;  Service: Orthopedics       Social History:  Social History     Socioeconomic History   • Marital status:      Spouse name: Not on file   • Number of children: Not on file   • Years of education: Not on file   • Highest education level: Not on file   Tobacco Use   • Smoking status: Former Smoker     Packs/day: 2.00     Years: 50.00     Pack years: 100.00     Types: Cigarettes     Last attempt to quit: 2015     Years since quittin.2   • Smokeless tobacco: Never Used   Substance and Sexual Activity   • Alcohol use: No   • Drug use: No   • Sexual activity: Defer       Review of Systems:  The following systems were reviewed as they relate to the cardiovascular system: Constitutional, Eyes, ENT, Cardiovascular, Respiratory, Gastrointestinal, Integumentary, Neurological, Psychiatric, Hematologic, Endocrine, Musculoskeletal, and Genitourinary. The pertinent cardiovascular findings are reported above with all other cardiovascular points within those systems being negative.    Diagnostic Study Review:     Current Electrocardiogram:  Procedures      NOTE: The following portions of the patient's history were reviewed and updated this visit as appropriate: allergies, current medications, past family history, past medical history, past social history, past surgical history and problem list.

## 2020-06-01 RX ORDER — BUMETANIDE 1 MG/1
TABLET ORAL
Qty: 30 TABLET | Refills: 6 | Status: SHIPPED | OUTPATIENT
Start: 2020-06-01 | End: 2021-01-27

## 2020-06-18 ENCOUNTER — OFFICE VISIT (OUTPATIENT)
Dept: CARDIOLOGY | Facility: CLINIC | Age: 75
End: 2020-06-18

## 2020-06-18 VITALS
OXYGEN SATURATION: 96 % | RESPIRATION RATE: 18 BRPM | BODY MASS INDEX: 37.51 KG/M2 | WEIGHT: 262 LBS | HEART RATE: 65 BPM | DIASTOLIC BLOOD PRESSURE: 73 MMHG | HEIGHT: 70 IN | SYSTOLIC BLOOD PRESSURE: 195 MMHG

## 2020-06-18 DIAGNOSIS — I10 ESSENTIAL HYPERTENSION: ICD-10-CM

## 2020-06-18 DIAGNOSIS — G47.33 OBSTRUCTIVE SLEEP APNEA SYNDROME: ICD-10-CM

## 2020-06-18 DIAGNOSIS — E78.2 MIXED HYPERLIPIDEMIA: Primary | ICD-10-CM

## 2020-06-18 DIAGNOSIS — I25.10 CORONARY ARTERY DISEASE INVOLVING NATIVE CORONARY ARTERY OF NATIVE HEART WITHOUT ANGINA PECTORIS: ICD-10-CM

## 2020-06-18 PROBLEM — E78.5 HYPERLIPIDEMIA: Status: ACTIVE | Noted: 2020-06-18

## 2020-06-18 PROBLEM — G47.30 SLEEP APNEA: Status: ACTIVE | Noted: 2020-06-18

## 2020-06-18 PROCEDURE — 93000 ELECTROCARDIOGRAM COMPLETE: CPT | Performed by: INTERNAL MEDICINE

## 2020-06-18 PROCEDURE — 99214 OFFICE O/P EST MOD 30 MIN: CPT | Performed by: INTERNAL MEDICINE

## 2020-06-18 NOTE — PROGRESS NOTES
Cardiology Office Visit      Encounter Date:  06/18/2020    Patient ID:   Ricky Nunez is a 75 y.o. male.    Reason For Followup:  Shortness of breath  Coronary artery disease  Hypertension    Brief Clinical History:  Dear Dr. Mora, Dc Bearden MD    I had the pleasure of seeing Ricky Nunez today. As you are well aware, this is a 75 y.o. male with a past medical history that is significant for history of hypertension and hyperlipidemia history of acute myocardial infarction cardiac catheterization showed evidence of a severe 2 vessel coronary artery disease with significant stenosis involving the diagonal branch of the LAD with subtotal occlusion that was the culprit lesion for the acute myocardial infarction successfully treated with a placement of a drug eluting stent. Repeat Cardiac catheterization at that time showed evidence of residual disease involving the LAD and also a right coronary artery, His IVUS evaluation of the LAD showed a significant stenosis patient underwent a successful coronary artery bypass surgery patient did very well.     Stress test with no evidence of any significant inducible ischemia  echocardiogram with normal LV systolic function and diastolic dysfunction    Interval History:  Patient denies any symptoms of chest pain shortness of breath dizziness or syncope    Assessment & Plan    Impressions:  Shortness of breath most likely diastolic dysfunction and obesity  Hypertension  Coronary artery disease  Prior coronary artery bypass surgery  Obesity  Obstructive sleep apnea    Recommendations:  Continued aggressive risk factor modification  Need for weight loss discussed with the patient   continue regular exercise   continue current medical therapy and regular exercise  Advised to get CBC CMP and lipids with the primary care physician office  Need for regular exercise and weight loss discussed the patient  Follow-up in office in 6 months       follow up in office in 3  "months    Objective:    Vitals:  Vitals:    06/18/20 1307   BP: (!) 195/73   BP Location: Left arm   Pulse: 65   Resp: 18   SpO2: 96%   Weight: 119 kg (262 lb)   Height: 177.8 cm (70\")       Physical Exam:    General: Alert, cooperative, no distress, appears stated age  Head:  Normocephalic, atraumatic, mucous membranes moist  Eyes:  Conjunctiva/corneas clear, EOM's intact     Neck:  Supple,  no adenopathy;      Lungs: Clear to auscultation bilaterally, no wheezes rhonchi rales are noted  Chest wall: No tenderness  Heart::  Regular rate and rhythm, S1 and S2 normal, no murmur, rub or gallop  Abdomen: Soft, non-tender, nondistended bowel sounds active  Extremities: No cyanosis, clubbing, or edema  Pulses: 2+ and symmetric all extremities  Skin:  No rashes or lesions  Neuro/psych: A&O x3. CN II through XII are grossly intact with appropriate affect      Allergies:  No Known Allergies    Medication Review:     Current Outpatient Medications:   •  albuterol (PROVENTIL HFA;VENTOLIN HFA) 108 (90 Base) MCG/ACT inhaler, Inhale 2 puffs Every 4 (Four) Hours As Needed for Wheezing., Disp: , Rfl:   •  ALPRAZolam (XANAX) 0.25 MG tablet, Take 0.25 mg by mouth 2 (Two) Times a Day As Needed for Anxiety., Disp: , Rfl:   •  amLODIPine (NORVASC) 10 MG tablet, Take 10 mg by mouth Daily., Disp: , Rfl:   •  aspirin 81 MG EC tablet, Take 81 mg by mouth Daily., Disp: , Rfl:   •  atorvastatin (LIPITOR) 80 MG tablet, Take 80 mg by mouth Every Night., Disp: , Rfl:   •  bumetanide (BUMEX) 1 MG tablet, TAKE 1 TABLET BY MOUTH EVERY DAY (Patient taking differently: Daily As Needed.), Disp: 30 tablet, Rfl: 6  •  carvedilol (COREG) 6.25 MG tablet, Take 6.25 mg by mouth 2 (Two) Times a Day With Meals., Disp: , Rfl:   •  escitalopram (LEXAPRO) 10 MG tablet, Take 10 mg by mouth Daily., Disp: , Rfl:   •  metFORMIN (GLUCOPHAGE) 1000 MG tablet, TK 1 T PO  BID WITH MEALS, Disp: , Rfl: 6    Family History:  Family History   Problem Relation Age of Onset "   • Heart disease Mother    • Malig Hyperthermia Neg Hx        Past Medical History:  Past Medical History:   Diagnosis Date   • Acid reflux    • Anxiety    • Arthritis    • Cancer (CMS/HCC)     SKIN   • COPD (chronic obstructive pulmonary disease) (CMS/HCC)    • Coronary artery disease    • Depression    • Heart attack (CMS/HCC)    • Hyperlipidemia    • Hypertension    • Knee pain     LEFT   • Seasonal allergies    • Sleep apnea     CPAP       Past surgical History:  Past Surgical History:   Procedure Laterality Date   • CHOLECYSTECTOMY OPEN     • CORONARY ARTERY BYPASS GRAFT     • KNEE SURGERY Left    • DE TOTAL KNEE ARTHROPLASTY Left 10/10/2017    Procedure: LT TOTAL KNEE ARTHROPLASTY;  Surgeon: Maksim Pinto MD;  Location: [facility];  Service: Orthopedics       Social History:  Social History     Socioeconomic History   • Marital status:      Spouse name: Not on file   • Number of children: Not on file   • Years of education: Not on file   • Highest education level: Not on file   Tobacco Use   • Smoking status: Former Smoker     Packs/day: 2.00     Years: 50.00     Pack years: 100.00     Types: Cigarettes     Last attempt to quit: 2015     Years since quittin.7   • Smokeless tobacco: Never Used   Substance and Sexual Activity   • Alcohol use: No   • Drug use: No   • Sexual activity: Defer       Review of Systems:  The following systems were reviewed as they relate to the cardiovascular system: Constitutional, Eyes, ENT, Cardiovascular, Respiratory, Gastrointestinal, Integumentary, Neurological, Psychiatric, Hematologic, Endocrine, Musculoskeletal, and Genitourinary. The pertinent cardiovascular findings are reported above with all other cardiovascular points within those systems being negative.    Diagnostic Study Review:     Current Electrocardiogram:    ECG 12 Lead  Date/Time: 2020 1:44 PM  Performed by: Staci Estrada MD  Authorized by: Staci Estrada MD    Comparison: compared with previous ECG   Similar to previous ECG  Rhythm: sinus rhythm  Rate: normal  BPM: 65  Conduction: conduction normal  QRS axis: normal  Other findings: non-specific ST-T wave changes and poor R wave progression    Clinical impression: abnormal EKG              NOTE: The following portions of the patient's history were reviewed and updated this visit as appropriate: allergies, current medications, past family history, past medical history, past social history, past surgical history and problem list.

## 2020-12-18 ENCOUNTER — OFFICE VISIT (OUTPATIENT)
Dept: CARDIOLOGY | Facility: CLINIC | Age: 75
End: 2020-12-18

## 2020-12-18 VITALS
WEIGHT: 264 LBS | HEART RATE: 65 BPM | OXYGEN SATURATION: 93 % | BODY MASS INDEX: 37.88 KG/M2 | SYSTOLIC BLOOD PRESSURE: 180 MMHG | DIASTOLIC BLOOD PRESSURE: 82 MMHG

## 2020-12-18 DIAGNOSIS — I25.10 CORONARY ARTERY DISEASE INVOLVING NATIVE CORONARY ARTERY OF NATIVE HEART WITHOUT ANGINA PECTORIS: Primary | ICD-10-CM

## 2020-12-18 DIAGNOSIS — I48.20 ATRIAL FIBRILLATION, CHRONIC (HCC): ICD-10-CM

## 2020-12-18 DIAGNOSIS — I48.11 LONGSTANDING PERSISTENT ATRIAL FIBRILLATION (HCC): ICD-10-CM

## 2020-12-18 DIAGNOSIS — I10 ESSENTIAL HYPERTENSION: ICD-10-CM

## 2020-12-18 DIAGNOSIS — R06.02 SHORTNESS OF BREATH: ICD-10-CM

## 2020-12-18 DIAGNOSIS — E78.2 MIXED HYPERLIPIDEMIA: ICD-10-CM

## 2020-12-18 PROCEDURE — 93000 ELECTROCARDIOGRAM COMPLETE: CPT | Performed by: INTERNAL MEDICINE

## 2020-12-18 PROCEDURE — 99214 OFFICE O/P EST MOD 30 MIN: CPT | Performed by: INTERNAL MEDICINE

## 2021-01-18 ENCOUNTER — HOSPITAL ENCOUNTER (OUTPATIENT)
Dept: CARDIOLOGY | Facility: HOSPITAL | Age: 76
Discharge: HOME | End: 2021-01-18

## 2021-01-18 DIAGNOSIS — I25.10 CORONARY ARTERY DISEASE INVOLVING NATIVE CORONARY ARTERY OF NATIVE HEART WITHOUT ANGINA PECTORIS: ICD-10-CM

## 2021-01-18 DIAGNOSIS — R06.02 SHORTNESS OF BREATH: ICD-10-CM

## 2021-01-18 DIAGNOSIS — I48.11 LONGSTANDING PERSISTENT ATRIAL FIBRILLATION (HCC): ICD-10-CM

## 2021-01-18 PROCEDURE — A9500 TC99M SESTAMIBI: HCPCS | Performed by: INTERNAL MEDICINE

## 2021-01-18 PROCEDURE — 0 TECHNETIUM SESTAMIBI: Performed by: INTERNAL MEDICINE

## 2021-01-18 PROCEDURE — 93016 CV STRESS TEST SUPVJ ONLY: CPT | Performed by: INTERNAL MEDICINE

## 2021-01-18 PROCEDURE — 78452 HT MUSCLE IMAGE SPECT MULT: CPT

## 2021-01-18 PROCEDURE — 78452 HT MUSCLE IMAGE SPECT MULT: CPT | Performed by: INTERNAL MEDICINE

## 2021-01-18 PROCEDURE — 93017 CV STRESS TEST TRACING ONLY: CPT

## 2021-01-18 PROCEDURE — 25010000002 REGADENOSON 0.4 MG/5ML SOLUTION: Performed by: INTERNAL MEDICINE

## 2021-01-18 PROCEDURE — 93018 CV STRESS TEST I&R ONLY: CPT | Performed by: INTERNAL MEDICINE

## 2021-01-18 RX ADMIN — REGADENOSON 0.4 MG: 0.08 INJECTION, SOLUTION INTRAVENOUS at 12:30

## 2021-01-18 RX ADMIN — TECHNETIUM TC 99M SESTAMIBI 1 DOSE: 1 INJECTION INTRAVENOUS at 12:15

## 2021-01-18 RX ADMIN — TECHNETIUM TC 99M SESTAMIBI 1 DOSE: 1 INJECTION INTRAVENOUS at 12:30

## 2021-01-18 NOTE — TELEPHONE ENCOUNTER
Spoke to the Pts wife regarding the Pts blood thinner. She stated that they haven't received any of my messages regarding having the Pt take Xarelto 20- 1 po qhs per Dr. Murphy since insurance doesn't want to cover Eliquis. She is agreeable to have the Pt start taking Xaretlo today. Rx sent.

## 2021-01-26 LAB
BH CV STRESS COMMENTS STAGE 1: NORMAL
BH CV STRESS DOSE REGADENOSON STAGE 1: 0.4
BH CV STRESS DURATION MIN STAGE 1: 0
BH CV STRESS DURATION SEC STAGE 1: 10
BH CV STRESS PROTOCOL 1: NORMAL
BH CV STRESS RECOVERY BP: NORMAL MMHG
BH CV STRESS RECOVERY HR: 61 BPM
BH CV STRESS STAGE 1: 1
LV EF NUC BP: 58 %
MAXIMAL PREDICTED HEART RATE: 147 BPM
PERCENT MAX PREDICTED HR: 42.86 %
STRESS BASELINE BP: NORMAL MMHG
STRESS BASELINE HR: 54 BPM
STRESS PERCENT HR: 50 %
STRESS POST PEAK BP: NORMAL MMHG
STRESS POST PEAK HR: 63 BPM
STRESS TARGET HR: 125 BPM

## 2021-01-27 ENCOUNTER — OFFICE VISIT (OUTPATIENT)
Dept: CARDIOLOGY | Facility: CLINIC | Age: 76
End: 2021-01-27

## 2021-01-27 VITALS
WEIGHT: 265 LBS | HEIGHT: 70 IN | RESPIRATION RATE: 18 BRPM | DIASTOLIC BLOOD PRESSURE: 61 MMHG | HEART RATE: 65 BPM | BODY MASS INDEX: 37.94 KG/M2 | OXYGEN SATURATION: 97 % | SYSTOLIC BLOOD PRESSURE: 172 MMHG

## 2021-01-27 DIAGNOSIS — I48.20 ATRIAL FIBRILLATION, CHRONIC (HCC): ICD-10-CM

## 2021-01-27 DIAGNOSIS — I25.10 CORONARY ARTERY DISEASE INVOLVING NATIVE CORONARY ARTERY OF NATIVE HEART WITHOUT ANGINA PECTORIS: ICD-10-CM

## 2021-01-27 DIAGNOSIS — I10 ESSENTIAL HYPERTENSION: Primary | ICD-10-CM

## 2021-01-27 DIAGNOSIS — E78.2 MIXED HYPERLIPIDEMIA: ICD-10-CM

## 2021-01-27 PROCEDURE — 93000 ELECTROCARDIOGRAM COMPLETE: CPT | Performed by: INTERNAL MEDICINE

## 2021-01-27 PROCEDURE — 99214 OFFICE O/P EST MOD 30 MIN: CPT | Performed by: INTERNAL MEDICINE

## 2021-01-27 RX ORDER — WARFARIN SODIUM 5 MG/1
5 TABLET ORAL
Qty: 30 TABLET | Refills: 0 | Status: SHIPPED | OUTPATIENT
Start: 2021-01-27 | End: 2021-02-16 | Stop reason: SDUPTHER

## 2021-01-27 RX ORDER — BUMETANIDE 1 MG/1
TABLET ORAL
Qty: 30 TABLET | Refills: 6 | Status: SHIPPED | OUTPATIENT
Start: 2021-01-27 | End: 2021-04-01

## 2021-01-27 NOTE — PROGRESS NOTES
Cardiology Office Visit      Encounter Date:  01/27/2021    Patient ID:   Ricky Nunez is a 75 y.o. male.    Reason For Followup:  Shortness of breath  Coronary artery disease  Hypertension    Brief Clinical History:  Dear Dr. Mora, Dc Bearden MD    I had the pleasure of seeing Ricky Nunez today. As you are well aware, this is a 75 y.o. male with a past medical history that is significant for history of hypertension and hyperlipidemia history of acute myocardial infarction cardiac catheterization showed evidence of a severe 2 vessel coronary artery disease with significant stenosis involving the diagonal branch of the LAD with subtotal occlusion that was the culprit lesion for the acute myocardial infarction successfully treated with a placement of a drug eluting stent. Repeat Cardiac catheterization at that time showed evidence of residual disease involving the LAD and also a right coronary artery, His IVUS evaluation of the LAD showed a significant stenosis patient underwent a successful coronary artery bypass surgery patient did very well.     Stress test with no evidence of any significant inducible ischemia  echocardiogram with normal LV systolic function and diastolic dysfunction    Interval History:  Patient denies any symptoms of chest pain  dizziness or syncope  Complaining of significant shortness of breath and dyspnea on exertion which is progressively getting worse  Assessment & Plan    Impressions:  Shortness of breath and dyspnea on exertion which is progressively getting worse  Hypertension  Coronary artery disease  Prior coronary artery bypass surgery  Obesity  Obstructive sleep apnea  Newly diagnosed atrial fibrillation    Recommendations:  Continued aggressive risk factor modification  Need for weight loss discussed with the patient   continue regular exercise   continue current medical therapy and regular exercise  Start patient on anticoagulation therapy for stroke prophylaxis for  "atrial fibrillation  Risk benefits and alternatives were anticoagulation therapy reviewed and discussed with the patient  Echocardiogram to assess the LV systolic function rule out any significant cardiomyopathy from the newly diagnosed atrial fibrillation  Stress test with no significant inducible ischemia  If patient continues to stay in atrial fibrillation continues to be symptomatic might benefit from a cardioversion to convert into sinus rhythm  Patient could not afford her Xarelto secondary to co-pay  Start patient on Coumadin and close monitoring of PT/INR  Once patient is therapeutic on Coumadin for 4 weeks we will schedule for cardioversion  Follow-up in office in 2 months    Objective:    Vitals:  Vitals:    01/27/21 0944   BP: 172/61   BP Location: Left arm   Pulse: 65   Resp: 18   SpO2: 97%   Weight: 120 kg (265 lb)   Height: 177.8 cm (70\")       Physical Exam:    General: Alert, cooperative, no distress, appears stated age  Head:  Normocephalic, atraumatic, mucous membranes moist  Eyes:  Conjunctiva/corneas clear, EOM's intact     Neck:  Supple,  no adenopathy;      Lungs: Clear to auscultation bilaterally, no wheezes rhonchi rales are noted  Chest wall: No tenderness  Heart::  Regular rate and rhythm, S1 and S2 normal, no murmur, rub or gallop  Abdomen: Soft, non-tender, nondistended bowel sounds active  Extremities: No cyanosis, clubbing, or edema  Pulses: 2+ and symmetric all extremities  Skin:  No rashes or lesions  Neuro/psych: A&O x3. CN II through XII are grossly intact with appropriate affect      Allergies:  No Known Allergies    Medication Review:     Current Outpatient Medications:   •  albuterol (PROVENTIL HFA;VENTOLIN HFA) 108 (90 Base) MCG/ACT inhaler, Inhale 2 puffs Every 4 (Four) Hours As Needed for Wheezing., Disp: , Rfl:   •  ALPRAZolam (XANAX) 0.25 MG tablet, Take 0.25 mg by mouth 2 (Two) Times a Day As Needed for Anxiety., Disp: , Rfl:   •  amLODIPine (NORVASC) 10 MG tablet, Take 10 " mg by mouth Daily., Disp: , Rfl:   •  aspirin 81 MG EC tablet, Take 81 mg by mouth Daily., Disp: , Rfl:   •  atorvastatin (LIPITOR) 80 MG tablet, Take 80 mg by mouth Every Night., Disp: , Rfl:   •  bumetanide (BUMEX) 1 MG tablet, TAKE 1 TABLET BY MOUTH EVERY DAY (Patient taking differently: Daily As Needed.), Disp: 30 tablet, Rfl: 6  •  carvedilol (COREG) 6.25 MG tablet, Take 6.25 mg by mouth 2 (Two) Times a Day With Meals., Disp: , Rfl:   •  escitalopram (LEXAPRO) 10 MG tablet, Take 10 mg by mouth Daily., Disp: , Rfl:   •  metFORMIN (GLUCOPHAGE) 1000 MG tablet, TK 1 T PO  BID WITH MEALS, Disp: , Rfl: 6  •  rivaroxaban (Xarelto) 20 MG tablet, Take 1 tablet by mouth Daily., Disp: 30 tablet, Rfl: 6    Family History:  Family History   Problem Relation Age of Onset   • Heart disease Mother    • Malig Hyperthermia Neg Hx        Past Medical History:  Past Medical History:   Diagnosis Date   • Acid reflux    • Anxiety    • Arthritis    • Cancer (CMS/HCC)     SKIN   • COPD (chronic obstructive pulmonary disease) (CMS/HCC)    • Coronary artery disease    • Depression    • Heart attack (CMS/HCC)    • Hyperlipidemia    • Hypertension    • Knee pain     LEFT   • Seasonal allergies    • Sleep apnea     CPAP       Past surgical History:  Past Surgical History:   Procedure Laterality Date   • CHOLECYSTECTOMY OPEN     • CORONARY ARTERY BYPASS GRAFT  2015   • KNEE SURGERY Left    • [place] TOTAL KNEE ARTHROPLASTY Left 10/10/2017    Procedure: LT TOTAL KNEE ARTHROPLASTY;  Surgeon: Maksim Pinto MD;  Location: [facility];  Service: Orthopedics       Social History:  Social History     Socioeconomic History   • Marital status:      Spouse name: Not on file   • Number of children: Not on file   • Years of education: Not on file   • Highest education level: Not on file   Tobacco Use   • Smoking status: Former Smoker     Packs/day: 2.00     Years: 50.00     Pack years: 100.00     Types: Cigarettes     Quit date: 9/26/2015      Years since quittin.3   • Smokeless tobacco: Never Used   Substance and Sexual Activity   • Alcohol use: No   • Drug use: No   • Sexual activity: Defer       Review of Systems:  The following systems were reviewed as they relate to the cardiovascular system: Constitutional, Eyes, ENT, Cardiovascular, Respiratory, Gastrointestinal, Integumentary, Neurological, Psychiatric, Hematologic, Endocrine, Musculoskeletal, and Genitourinary. The pertinent cardiovascular findings are reported above with all other cardiovascular points within those systems being negative.    Diagnostic Study Review:     Current Electrocardiogram:    ECG 12 Lead    Date/Time: 2021 12:13 PM  Performed by: Staci Estrada MD  Authorized by: Staci Estrada MD   Comparison: compared with previous ECG   Similar to previous ECG  Rhythm: atrial fibrillation  Rate: normal  BPM: 65  Conduction: conduction normal  QRS axis: normal  Other findings: non-specific ST-T wave changes    Clinical impression: abnormal EKG              NOTE: The following portions of the patient's history were reviewed and updated this visit as appropriate: allergies, current medications, past family history, past medical history, past social history, past surgical history and problem list.

## 2021-01-29 ENCOUNTER — ANTICOAGULATION VISIT (OUTPATIENT)
Dept: CARDIOLOGY | Facility: CLINIC | Age: 76
End: 2021-01-29

## 2021-01-29 ENCOUNTER — HOSPITAL ENCOUNTER (OUTPATIENT)
Dept: CARDIOLOGY | Facility: HOSPITAL | Age: 76
Discharge: HOME | End: 2021-01-29
Admitting: INTERNAL MEDICINE

## 2021-01-29 VITALS
HEART RATE: 64 BPM | DIASTOLIC BLOOD PRESSURE: 75 MMHG | SYSTOLIC BLOOD PRESSURE: 158 MMHG | WEIGHT: 265 LBS | BODY MASS INDEX: 37.94 KG/M2 | HEIGHT: 70 IN

## 2021-01-29 DIAGNOSIS — I48.20 ATRIAL FIBRILLATION, CHRONIC (HCC): Primary | ICD-10-CM

## 2021-01-29 LAB — INR PPP: 1 (ref 0.9–1.1)

## 2021-01-29 PROCEDURE — 93306 TTE W/DOPPLER COMPLETE: CPT | Performed by: INTERNAL MEDICINE

## 2021-01-29 PROCEDURE — 93306 TTE W/DOPPLER COMPLETE: CPT

## 2021-01-29 PROCEDURE — 85610 PROTHROMBIN TIME: CPT | Performed by: INTERNAL MEDICINE

## 2021-01-29 PROCEDURE — 36416 COLLJ CAPILLARY BLOOD SPEC: CPT | Performed by: INTERNAL MEDICINE

## 2021-02-01 ENCOUNTER — TELEPHONE (OUTPATIENT)
Dept: CARDIOLOGY | Facility: CLINIC | Age: 76
End: 2021-02-01

## 2021-02-01 LAB
ASCENDING AORTA: 3.3 CM
BH CV ECHO MEAS - ACS: 2.3 CM
BH CV ECHO MEAS - AO MAX PG (FULL): -0.27 MMHG
BH CV ECHO MEAS - AO MAX PG: 5.9 MMHG
BH CV ECHO MEAS - AO MEAN PG (FULL): 0.07 MMHG
BH CV ECHO MEAS - AO MEAN PG: 3.3 MMHG
BH CV ECHO MEAS - AO ROOT AREA (BSA CORRECTED): 1.6
BH CV ECHO MEAS - AO ROOT AREA: 11.2 CM^2
BH CV ECHO MEAS - AO ROOT DIAM: 3.8 CM
BH CV ECHO MEAS - AO V2 MAX: 122 CM/SEC
BH CV ECHO MEAS - AO V2 MEAN: 86.9 CM/SEC
BH CV ECHO MEAS - AO V2 VTI: 25.1 CM
BH CV ECHO MEAS - ASC AORTA: 3.3 CM
BH CV ECHO MEAS - AVA(I,A): 5.4 CM^2
BH CV ECHO MEAS - AVA(I,D): 5.4 CM^2
BH CV ECHO MEAS - AVA(V,A): 4.7 CM^2
BH CV ECHO MEAS - AVA(V,D): 4.7 CM^2
BH CV ECHO MEAS - BSA(HAYCOCK): 2.5 M^2
BH CV ECHO MEAS - BSA: 2.4 M^2
BH CV ECHO MEAS - BZI_BMI: 38 KILOGRAMS/M^2
BH CV ECHO MEAS - BZI_METRIC_HEIGHT: 177.8 CM
BH CV ECHO MEAS - BZI_METRIC_WEIGHT: 120.2 KG
BH CV ECHO MEAS - EDV(CUBED): 106.5 ML
BH CV ECHO MEAS - EDV(MOD-SP2): 98.8 ML
BH CV ECHO MEAS - EDV(MOD-SP4): 90.6 ML
BH CV ECHO MEAS - EDV(TEICH): 104.4 ML
BH CV ECHO MEAS - EF(CUBED): 54.5 %
BH CV ECHO MEAS - EF(MOD-BP): 59 %
BH CV ECHO MEAS - EF(MOD-SP2): 58.6 %
BH CV ECHO MEAS - EF(MOD-SP4): 58.8 %
BH CV ECHO MEAS - EF(TEICH): 46.2 %
BH CV ECHO MEAS - ESV(CUBED): 48.5 ML
BH CV ECHO MEAS - ESV(MOD-SP2): 40.9 ML
BH CV ECHO MEAS - ESV(MOD-SP4): 37.3 ML
BH CV ECHO MEAS - ESV(TEICH): 56.1 ML
BH CV ECHO MEAS - FS: 23.1 %
BH CV ECHO MEAS - IVS/LVPW: 1.1
BH CV ECHO MEAS - IVSD: 1.3 CM
BH CV ECHO MEAS - LA DIMENSION(2D): 4.5 CM
BH CV ECHO MEAS - LA DIMENSION: 4.7 CM
BH CV ECHO MEAS - LA/AO: 1.3
BH CV ECHO MEAS - LAT PEAK E' VEL: 9 CM/SEC
BH CV ECHO MEAS - LV DIASTOLIC VOL/BSA (35-75): 38.5 ML/M^2
BH CV ECHO MEAS - LV IVRT: 0.06 SEC
BH CV ECHO MEAS - LV MASS(C)D: 217.6 GRAMS
BH CV ECHO MEAS - LV MASS(C)DI: 92.5 GRAMS/M^2
BH CV ECHO MEAS - LV MAX PG: 6.2 MMHG
BH CV ECHO MEAS - LV MEAN PG: 3.2 MMHG
BH CV ECHO MEAS - LV SYSTOLIC VOL/BSA (12-30): 15.9 ML/M^2
BH CV ECHO MEAS - LV V1 MAX: 124.7 CM/SEC
BH CV ECHO MEAS - LV V1 MEAN: 85.9 CM/SEC
BH CV ECHO MEAS - LV V1 VTI: 29.2 CM
BH CV ECHO MEAS - LVIDD: 4.7 CM
BH CV ECHO MEAS - LVIDS: 3.6 CM
BH CV ECHO MEAS - LVOT AREA: 4.6 CM^2
BH CV ECHO MEAS - LVOT DIAM: 2.4 CM
BH CV ECHO MEAS - LVPWD: 1.2 CM
BH CV ECHO MEAS - MED PEAK E' VEL: 7 CM/SEC
BH CV ECHO MEAS - MV A MAX VEL: 78.1 CM/SEC
BH CV ECHO MEAS - MV DEC SLOPE: 421.4 CM/SEC^2
BH CV ECHO MEAS - MV DEC TIME: 0.23 SEC
BH CV ECHO MEAS - MV E MAX VEL: 95.9 CM/SEC
BH CV ECHO MEAS - MV E/A: 1.2
BH CV ECHO MEAS - MV MAX PG: 3.3 MMHG
BH CV ECHO MEAS - MV MEAN PG: 1.3 MMHG
BH CV ECHO MEAS - MV P1/2T: 52 MSEC
BH CV ECHO MEAS - MV V2 MAX: 90.4 CM/SEC
BH CV ECHO MEAS - MV V2 MEAN: 52.4 CM/SEC
BH CV ECHO MEAS - MV V2 VTI: 21.7 CM
BH CV ECHO MEAS - MVA(P1/2T): 4.2 CM2
BH CV ECHO MEAS - MVA(VTI): 6.2 CM^2
BH CV ECHO MEAS - PA ACC TIME: 0.08 SEC
BH CV ECHO MEAS - PA MAX PG (FULL): 1.4 MMHG
BH CV ECHO MEAS - PA MAX PG: 4.1 MMHG
BH CV ECHO MEAS - PA MEAN PG (FULL): 0.72 MMHG
BH CV ECHO MEAS - PA MEAN PG: 2 MMHG
BH CV ECHO MEAS - PA PR(ACCEL): 44.5 MMHG
BH CV ECHO MEAS - PA V2 MAX: 101.3 CM/SEC
BH CV ECHO MEAS - PA V2 MEAN: 67.1 CM/SEC
BH CV ECHO MEAS - PA V2 VTI: 22.6 CM
BH CV ECHO MEAS - PAPD(PI EDV): 13.5 MMHG
BH CV ECHO MEAS - PI END-D VEL: 116.9 CM/SEC
BH CV ECHO MEAS - PULM A REVS DUR: 0.11 SEC
BH CV ECHO MEAS - PULM A REVS VEL: 20.5 CM/SEC
BH CV ECHO MEAS - PULM DIAS VEL: 74.9 CM/SEC
BH CV ECHO MEAS - PULM S/D: 0.67
BH CV ECHO MEAS - PULM SYS VEL: 50 CM/SEC
BH CV ECHO MEAS - RAP SYSTOLE: 8 MMHG
BH CV ECHO MEAS - RV MAX PG: 2.7 MMHG
BH CV ECHO MEAS - RV MEAN PG: 1.3 MMHG
BH CV ECHO MEAS - RV V1 MAX: 81.8 CM/SEC
BH CV ECHO MEAS - RV V1 MEAN: 52.7 CM/SEC
BH CV ECHO MEAS - RV V1 VTI: 18.8 CM
BH CV ECHO MEAS - RVSP: 48.6 MMHG
BH CV ECHO MEAS - SI(AO): 119.1 ML/M^2
BH CV ECHO MEAS - SI(CUBED): 24.7 ML/M^2
BH CV ECHO MEAS - SI(LVOT): 57.5 ML/M^2
BH CV ECHO MEAS - SI(MOD-SP2): 24.6 ML/M^2
BH CV ECHO MEAS - SI(MOD-SP4): 22.6 ML/M^2
BH CV ECHO MEAS - SI(TEICH): 20.5 ML/M^2
BH CV ECHO MEAS - SV(AO): 280.3 ML
BH CV ECHO MEAS - SV(CUBED): 58 ML
BH CV ECHO MEAS - SV(LVOT): 135.3 ML
BH CV ECHO MEAS - SV(MOD-SP2): 57.9 ML
BH CV ECHO MEAS - SV(MOD-SP4): 53.3 ML
BH CV ECHO MEAS - SV(TEICH): 48.3 ML
BH CV ECHO MEAS - TAPSE (>1.6): 2.1 CM
BH CV ECHO MEAS - TR MAX PG: 41 MMHG
BH CV ECHO MEAS - TR MAX VEL: 318.6 CM/SEC
BH CV ECHO MEASUREMENTS AVERAGE E/E' RATIO: 11.99
BH CV VAS BP LEFT ARM: NORMAL MMHG
BH CV XLRA - RV BASE: 4.2 CM
BH CV XLRA - RV MID: 2.8 CM
BH CV XLRA - TDI S': 11 CM/SEC
IVRT: 63 MSEC
LEFT ATRIUM VOLUME INDEX: 26 ML/M2
LEFT ATRIUM VOLUME: 60 CM3
LV EF 2D ECHO EST: 60 %

## 2021-02-01 NOTE — TELEPHONE ENCOUNTER
Attempted to call the Pt regarding his echo results, but he didn't answer and I was unable to leave a message.

## 2021-02-02 ENCOUNTER — ANTICOAGULATION VISIT (OUTPATIENT)
Dept: CARDIOLOGY | Facility: CLINIC | Age: 76
End: 2021-02-02

## 2021-02-02 DIAGNOSIS — I48.20 ATRIAL FIBRILLATION, CHRONIC (HCC): Primary | ICD-10-CM

## 2021-02-02 LAB — INR PPP: 1.2 (ref 0.9–1.1)

## 2021-02-02 PROCEDURE — 36416 COLLJ CAPILLARY BLOOD SPEC: CPT | Performed by: INTERNAL MEDICINE

## 2021-02-02 PROCEDURE — 85610 PROTHROMBIN TIME: CPT | Performed by: INTERNAL MEDICINE

## 2021-02-09 ENCOUNTER — ANTICOAGULATION VISIT (OUTPATIENT)
Dept: CARDIOLOGY | Facility: CLINIC | Age: 76
End: 2021-02-09

## 2021-02-09 DIAGNOSIS — I48.20 ATRIAL FIBRILLATION, CHRONIC (HCC): Primary | ICD-10-CM

## 2021-02-09 LAB — INR PPP: 2.5 (ref 0.9–1.1)

## 2021-02-09 PROCEDURE — 85610 PROTHROMBIN TIME: CPT | Performed by: INTERNAL MEDICINE

## 2021-02-09 PROCEDURE — 36416 COLLJ CAPILLARY BLOOD SPEC: CPT | Performed by: INTERNAL MEDICINE

## 2021-02-15 ENCOUNTER — TELEPHONE (OUTPATIENT)
Dept: CARDIOLOGY | Facility: CLINIC | Age: 76
End: 2021-02-15

## 2021-02-16 ENCOUNTER — ANTICOAGULATION VISIT (OUTPATIENT)
Dept: CARDIOLOGY | Facility: CLINIC | Age: 76
End: 2021-02-16

## 2021-02-16 DIAGNOSIS — I48.20 ATRIAL FIBRILLATION, CHRONIC (HCC): Primary | ICD-10-CM

## 2021-02-16 LAB — INR PPP: 2.1 (ref 0.9–1.1)

## 2021-02-16 PROCEDURE — 36416 COLLJ CAPILLARY BLOOD SPEC: CPT | Performed by: INTERNAL MEDICINE

## 2021-02-16 PROCEDURE — 85610 PROTHROMBIN TIME: CPT | Performed by: INTERNAL MEDICINE

## 2021-02-16 RX ORDER — WARFARIN SODIUM 5 MG/1
TABLET ORAL
Qty: 40 TABLET | Refills: 2 | Status: SHIPPED | OUTPATIENT
Start: 2021-02-16 | End: 2021-03-16 | Stop reason: SDUPTHER

## 2021-03-02 ENCOUNTER — ANTICOAGULATION VISIT (OUTPATIENT)
Dept: CARDIOLOGY | Facility: CLINIC | Age: 76
End: 2021-03-02

## 2021-03-02 DIAGNOSIS — I48.20 ATRIAL FIBRILLATION, CHRONIC (HCC): Primary | ICD-10-CM

## 2021-03-02 LAB — INR PPP: 2 (ref 0.9–1.1)

## 2021-03-02 PROCEDURE — 36416 COLLJ CAPILLARY BLOOD SPEC: CPT | Performed by: INTERNAL MEDICINE

## 2021-03-02 PROCEDURE — 85610 PROTHROMBIN TIME: CPT | Performed by: INTERNAL MEDICINE

## 2021-03-03 ENCOUNTER — TELEPHONE (OUTPATIENT)
Dept: CARDIOLOGY | Facility: CLINIC | Age: 76
End: 2021-03-03

## 2021-03-03 NOTE — TELEPHONE ENCOUNTER
Pt returned my call and I informed him per Dr. Murphy that his echo was normal. The Pt stated understanding.

## 2021-03-16 ENCOUNTER — ANTICOAGULATION VISIT (OUTPATIENT)
Dept: CARDIOLOGY | Facility: CLINIC | Age: 76
End: 2021-03-16

## 2021-03-16 DIAGNOSIS — I48.20 ATRIAL FIBRILLATION, CHRONIC (HCC): Primary | ICD-10-CM

## 2021-03-16 LAB — INR PPP: 2.3 (ref 0.9–1.1)

## 2021-03-16 PROCEDURE — 36416 COLLJ CAPILLARY BLOOD SPEC: CPT | Performed by: INTERNAL MEDICINE

## 2021-03-16 PROCEDURE — 85610 PROTHROMBIN TIME: CPT | Performed by: INTERNAL MEDICINE

## 2021-03-16 RX ORDER — WARFARIN SODIUM 5 MG/1
TABLET ORAL
Qty: 120 TABLET | Refills: 1 | Status: SHIPPED | OUTPATIENT
Start: 2021-03-16 | End: 2021-04-01

## 2021-03-19 ENCOUNTER — PREP FOR SURGERY (OUTPATIENT)
Dept: OTHER | Facility: HOSPITAL | Age: 76
End: 2021-03-19

## 2021-03-19 DIAGNOSIS — I48.20 ATRIAL FIBRILLATION, CHRONIC (HCC): Primary | ICD-10-CM

## 2021-03-19 RX ORDER — SODIUM CHLORIDE 9 MG/ML
20 INJECTION, SOLUTION INTRAVENOUS CONTINUOUS
Status: CANCELLED | OUTPATIENT
Start: 2021-03-19

## 2021-03-23 DIAGNOSIS — I48.20 ATRIAL FIBRILLATION, CHRONIC (HCC): ICD-10-CM

## 2021-03-23 DIAGNOSIS — Z01.818 PREOP TESTING: Primary | ICD-10-CM

## 2021-03-30 ENCOUNTER — LAB (OUTPATIENT)
Dept: LAB | Facility: HOSPITAL | Age: 76
End: 2021-03-30

## 2021-03-30 ENCOUNTER — HOSPITAL ENCOUNTER (OUTPATIENT)
Dept: CARDIOLOGY | Facility: HOSPITAL | Age: 76
Discharge: HOME | End: 2021-03-30

## 2021-03-30 DIAGNOSIS — I48.20 ATRIAL FIBRILLATION, CHRONIC (HCC): ICD-10-CM

## 2021-03-30 DIAGNOSIS — Z01.818 PREOP TESTING: ICD-10-CM

## 2021-03-30 LAB
INR PPP: 2.42 (ref 2–3)
PROTHROMBIN TIME: 25.5 SECONDS (ref 19.4–28.5)
QT INTERVAL: 400 MS
SARS-COV-2 ORF1AB RESP QL NAA+PROBE: NOT DETECTED

## 2021-03-30 PROCEDURE — 36415 COLL VENOUS BLD VENIPUNCTURE: CPT

## 2021-03-30 PROCEDURE — 93010 ELECTROCARDIOGRAM REPORT: CPT | Performed by: INTERNAL MEDICINE

## 2021-03-30 PROCEDURE — U0004 COV-19 TEST NON-CDC HGH THRU: HCPCS

## 2021-03-30 PROCEDURE — 85610 PROTHROMBIN TIME: CPT

## 2021-03-30 PROCEDURE — 93005 ELECTROCARDIOGRAM TRACING: CPT | Performed by: INTERNAL MEDICINE

## 2021-03-30 PROCEDURE — U0005 INFEC AGEN DETEC AMPLI PROBE: HCPCS

## 2021-03-30 PROCEDURE — C9803 HOPD COVID-19 SPEC COLLECT: HCPCS

## 2021-03-31 ENCOUNTER — ANESTHESIA EVENT (OUTPATIENT)
Dept: CARDIOLOGY | Facility: HOSPITAL | Age: 76
End: 2021-03-31

## 2021-03-31 RX ORDER — SODIUM CHLORIDE 9 MG/ML
9 INJECTION, SOLUTION INTRAVENOUS CONTINUOUS PRN
Status: CANCELLED | OUTPATIENT
Start: 2021-03-31

## 2021-03-31 RX ORDER — SODIUM CHLORIDE 0.9 % (FLUSH) 0.9 %
10 SYRINGE (ML) INJECTION AS NEEDED
Status: CANCELLED | OUTPATIENT
Start: 2021-03-31

## 2021-03-31 RX ORDER — SODIUM CHLORIDE 0.9 % (FLUSH) 0.9 %
10 SYRINGE (ML) INJECTION EVERY 12 HOURS SCHEDULED
Status: CANCELLED | OUTPATIENT
Start: 2021-03-31

## 2021-04-01 ENCOUNTER — DOCUMENTATION (OUTPATIENT)
Dept: CARDIOLOGY | Facility: CLINIC | Age: 76
End: 2021-04-01

## 2021-04-01 ENCOUNTER — HOSPITAL ENCOUNTER (OUTPATIENT)
Dept: CARDIOLOGY | Facility: HOSPITAL | Age: 76
Discharge: HOME | End: 2021-04-01

## 2021-04-01 ENCOUNTER — ANESTHESIA (OUTPATIENT)
Dept: CARDIOLOGY | Facility: HOSPITAL | Age: 76
End: 2021-04-01

## 2021-04-01 VITALS
DIASTOLIC BLOOD PRESSURE: 89 MMHG | TEMPERATURE: 98.9 F | WEIGHT: 263.01 LBS | HEIGHT: 70 IN | RESPIRATION RATE: 16 BRPM | HEART RATE: 62 BPM | SYSTOLIC BLOOD PRESSURE: 149 MMHG | OXYGEN SATURATION: 96 % | BODY MASS INDEX: 37.65 KG/M2

## 2021-04-01 DIAGNOSIS — I48.20 ATRIAL FIBRILLATION, CHRONIC (HCC): ICD-10-CM

## 2021-04-01 PROCEDURE — 93005 ELECTROCARDIOGRAM TRACING: CPT | Performed by: INTERNAL MEDICINE

## 2021-04-01 PROCEDURE — 93010 ELECTROCARDIOGRAM REPORT: CPT | Performed by: INTERNAL MEDICINE

## 2021-04-01 RX ORDER — OMEPRAZOLE 20 MG/1
20 CAPSULE, DELAYED RELEASE ORAL DAILY
COMMUNITY

## 2021-04-01 RX ORDER — WARFARIN SODIUM 7.5 MG/1
7.5 TABLET ORAL
COMMUNITY

## 2021-04-01 RX ORDER — WARFARIN SODIUM 5 MG/1
5 TABLET ORAL 3 TIMES WEEKLY
COMMUNITY
End: 2021-11-02

## 2021-04-01 RX ORDER — GLIPIZIDE 10 MG/1
10 TABLET, FILM COATED, EXTENDED RELEASE ORAL DAILY
COMMUNITY

## 2021-04-01 RX ORDER — AMIODARONE HYDROCHLORIDE 200 MG/1
200 TABLET ORAL DAILY
Qty: 30 TABLET | Refills: 1 | Status: SHIPPED | OUTPATIENT
Start: 2021-04-01 | End: 2021-10-06

## 2021-04-01 NOTE — PROGRESS NOTES
Patient was brought to the hospital for cardioversion patient noted to be in sinus rhythm so cardioversion was aborted  Patient is advised to get started on amiodarone 200 mg p.o. once a day to see if that will maintain sinus rhythm  Decrease the dose of Coumadin to 5 mg p.o. once a day  PT/INR in the office next week  Follow-up in office as scheduled  Discussed with the patient and wife

## 2021-04-01 NOTE — NURSING NOTE
Dr. Estrada notified that patient is in a normal sinus rhythm. Patient and wife also notified. Dr. Estrada said he would come talk to patient and wife.

## 2021-04-01 NOTE — ANESTHESIA PREPROCEDURE EVALUATION
Anesthesia Evaluation     Patient summary reviewed and Nursing notes reviewed   NPO Solid Status: > 8 hours  NPO Liquid Status: > 8 hours           Airway   Mallampati: II  TM distance: >3 FB  Neck ROM: full  No difficulty expected  Dental - normal exam     Pulmonary - normal exam   (+) COPD, sleep apnea,   Cardiovascular - normal exam    PT is on anticoagulation therapy    (+) hypertension, past MI , CAD, CABG, dysrhythmias Atrial Fib, hyperlipidemia,       Neuro/Psych  (+) psychiatric history,     GI/Hepatic/Renal/Endo    (+)  GERD,      Musculoskeletal     Abdominal  - normal exam    Bowel sounds: normal.   Substance History      OB/GYN          Other   arthritis,    history of cancer    ROS/Med Hx Other: AF         · Left ventricular wall thickness is consistent with concentric hypertrophy.  · Estimated left ventricular EF = 60% Estimated left ventricular EF was in agreement with the calculated left ventricular EF. Left ventricular systolic function is normal.  · Mild dilation of the aortic root is present.  · Estimated right ventricular systolic pressure from tricuspid regurgitation is normal (<35 mmHg).  · Left ventricular diastolic function is consistent with (grade I) impaired relaxation.       · Myocardial perfusion imaging indicates a normal myocardial perfusion study with no evidence of ischemia.  · Left ventricular ejection fraction is normal. (Calculated EF = 58%).  · Impressions are consistent with a low risk study.                Anesthesia Plan    ASA 3     MAC     intravenous induction     Anesthetic plan, all risks, benefits, and alternatives have been provided, discussed and informed consent has been obtained with: patient.    Plan discussed with CRNA and CAA.

## 2021-04-01 NOTE — DISCHARGE INSTRUCTIONS
Amiodarone tablets  What is this medicine?  AMIODARONE (a CAMERON oh osbaldo armenta) is an antiarrhythmic drug. It helps make your heart beat regularly. Because of the side effects caused by this medicine, it is only used when other medicines have not worked. It is usually used for heartbeat problems that may be life threatening.  This medicine may be used for other purposes; ask your health care provider or pharmacist if you have questions.  COMMON BRAND NAME(S): Cordarone, Pacerone  What should I tell my health care provider before I take this medicine?  They need to know if you have any of these conditions:  · liver disease  · lung disease  · other heart problems  · thyroid disease  · an unusual or allergic reaction to amiodarone, iodine, other medicines, foods, dyes, or preservatives  · pregnant or trying to get pregnant  · breast-feeding  How should I use this medicine?  Take this medicine by mouth with a glass of water. Follow the directions on the prescription label. You can take this medicine with or without food. However, you should always take it the same way each time. Take your doses at regular intervals. Do not take your medicine more often than directed. Do not stop taking except on the advice of your doctor or health care professional.  A special MedGuide will be given to you by the pharmacist with each prescription and refill. Be sure to read this information carefully each time.  Talk to your pediatrician regarding the use of this medicine in children. Special care may be needed.  Overdosage: If you think you have taken too much of this medicine contact a poison control center or emergency room at once.  NOTE: This medicine is only for you. Do not share this medicine with others.  What if I miss a dose?  If you miss a dose, take it as soon as you can. If it is almost time for your next dose, take only that dose. Do not take double or extra doses.  What may interact with this medicine?  Do not take this  medicine with any of the following medications:  · abarelix  · apomorphine  · arsenic trioxide  · certain antibiotics like erythromycin, gemifloxacin, levofloxacin, pentamidine  · certain medicines for depression like amoxapine, tricyclic antidepressants  · certain medicines for fungal infections like fluconazole, itraconazole, ketoconazole, posaconazole, voriconazole  · certain medicines for irregular heart beat like disopyramide, dronedarone, ibutilide, propafenone, sotalol  · certain medicines for malaria like chloroquine, halofantrine  · cisapride  · droperidol  · haloperidol  · hawthorn  · maprotiline  · methadone  · phenothiazines like chlorpromazine, mesoridazine, thioridazine  · pimozide  · ranolazine  · red yeast rice  · vardenafil  This medicine may also interact with the following medications:  · antiviral medicines for HIV or AIDS  · certain medicines for blood pressure, heart disease, irregular heart beat  · certain medicines for cholesterol like atorvastatin, cerivastatin, lovastatin, simvastatin  · certain medicines for hepatitis C like sofosbuvir and ledipasvir; sofosbuvir  · certain medicines for seizures like phenytoin  · certain medicines for thyroid problems  · certain medicines that treat or prevent blood clots like warfarin  · cholestyramine  · cimetidine  · clopidogrel  · cyclosporine  · dextromethorphan  · diuretics  · dofetilide  · fentanyl  · general anesthetics  · grapefruit juice  · lidocaine  · loratadine  · methotrexate  · other medicines that prolong the QT interval (cause an abnormal heart rhythm)  · procainamide  · quinidine  · rifabutin, rifampin, or rifapentine  · Kevin's Wort  · trazodone  · ziprasidone  This list may not describe all possible interactions. Give your health care provider a list of all the medicines, herbs, non-prescription drugs, or dietary supplements you use. Also tell them if you smoke, drink alcohol, or use illegal drugs. Some items may interact with your  medicine.  What should I watch for while using this medicine?  Your condition will be monitored closely when you first begin therapy. Often, this drug is first started in a hospital or other monitored health care setting. Once you are on maintenance therapy, visit your doctor or health care professional for regular checks on your progress. Because your condition and use of this medicine carry some risk, it is a good idea to carry an identification card, necklace or bracelet with details of your condition, medications, and doctor or health care professional.  You may get drowsy or dizzy. Do not drive, use machinery, or do anything that needs mental alertness until you know how this medicine affects you. Do not stand or sit up quickly, especially if you are an older patient. This reduces the risk of dizzy or fainting spells.  This medicine can make you more sensitive to the sun. Keep out of the sun. If you cannot avoid being in the sun, wear protective clothing and use sunscreen. Do not use sun lamps or tanning beds/booths.  You should have regular eye exams before and during treatment. Call your doctor if you have blurred vision, see halos, or your eyes become sensitive to light. Your eyes may get dry. It may be helpful to use a lubricating eye solution or artificial tears solution.  If you are going to have surgery or a procedure that requires contrast dyes, tell your doctor or health care professional that you are taking this medicine.  What side effects may I notice from receiving this medicine?  Side effects that you should report to your doctor or health care professional as soon as possible:  · allergic reactions like skin rash, itching or hives, swelling of the face, lips, or tongue  · blue-gray coloring of the skin  · blurred vision, seeing blue green halos, increased sensitivity of the eyes to light  · breathing problems  · chest pain  · dark urine  · fast, irregular heartbeat  · feeling faint or  light-headed  · intolerance to heat or cold  · nausea or vomiting  · pain and swelling of the scrotum  · pain, tingling, numbness in feet, hands  · redness, blistering, peeling or loosening of the skin, including inside the mouth  · spitting up blood  · stomach pain  · sweating  · unusual or uncontrolled movements of body  · unusually weak or tired  · weight gain or loss  · yellowing of the eyes or skin  Side effects that usually do not require medical attention (report to your doctor or health care professional if they continue or are bothersome):  · change in sex drive or performance  · constipation  · dizziness  · headache  · loss of appetite  · trouble sleeping  This list may not describe all possible side effects. Call your doctor for medical advice about side effects. You may report side effects to FDA at 2-918-FDA-2804.  Where should I keep my medicine?  Keep out of the reach of children.  Store at room temperature between 20 and 25 degrees C (68 and 77 degrees F). Protect from light. Keep container tightly closed. Throw away any unused medicine after the expiration date.  NOTE: This sheet is a summary. It may not cover all possible information. If you have questions about this medicine, talk to your doctor, pharmacist, or health care provider.  © 2021 [employer] (2019-11-20 13:44:04)

## 2021-04-01 NOTE — DISCHARGE INSTR - ACTIVITY
New medication  Amiodarone 200mg once a day    Follow up with Dr. Angel on April 6th at 1015  INR check in  office on April 6th at 1005    Coumadin changed to 5mg once a day

## 2021-04-01 NOTE — NURSING NOTE
Dr. Estrada at bedside. Patients cardioversion is canceled and patient is going on on Amiodarone and will follow up with Dr. Estrada in 1 week.

## 2021-04-02 LAB — QT INTERVAL: 438 MS

## 2021-04-06 ENCOUNTER — ANTICOAGULATION VISIT (OUTPATIENT)
Dept: CARDIOLOGY | Facility: CLINIC | Age: 76
End: 2021-04-06

## 2021-04-06 ENCOUNTER — OFFICE VISIT (OUTPATIENT)
Dept: CARDIOLOGY | Facility: CLINIC | Age: 76
End: 2021-04-06

## 2021-04-06 VITALS
SYSTOLIC BLOOD PRESSURE: 172 MMHG | RESPIRATION RATE: 18 BRPM | HEIGHT: 70 IN | HEART RATE: 60 BPM | OXYGEN SATURATION: 95 % | DIASTOLIC BLOOD PRESSURE: 75 MMHG | BODY MASS INDEX: 37.94 KG/M2 | WEIGHT: 265 LBS

## 2021-04-06 DIAGNOSIS — I25.10 CORONARY ARTERY DISEASE INVOLVING NATIVE CORONARY ARTERY OF NATIVE HEART WITHOUT ANGINA PECTORIS: ICD-10-CM

## 2021-04-06 DIAGNOSIS — E78.2 MIXED HYPERLIPIDEMIA: ICD-10-CM

## 2021-04-06 DIAGNOSIS — I10 ESSENTIAL HYPERTENSION: ICD-10-CM

## 2021-04-06 DIAGNOSIS — I48.20 ATRIAL FIBRILLATION, CHRONIC (HCC): Primary | ICD-10-CM

## 2021-04-06 DIAGNOSIS — I48.11 LONGSTANDING PERSISTENT ATRIAL FIBRILLATION (HCC): ICD-10-CM

## 2021-04-06 DIAGNOSIS — I48.11 LONGSTANDING PERSISTENT ATRIAL FIBRILLATION (HCC): Primary | ICD-10-CM

## 2021-04-06 LAB — INR PPP: 2.1 (ref 0.9–1.1)

## 2021-04-06 PROCEDURE — 99214 OFFICE O/P EST MOD 30 MIN: CPT | Performed by: INTERNAL MEDICINE

## 2021-04-06 PROCEDURE — 85610 PROTHROMBIN TIME: CPT | Performed by: INTERNAL MEDICINE

## 2021-04-06 PROCEDURE — 36416 COLLJ CAPILLARY BLOOD SPEC: CPT | Performed by: INTERNAL MEDICINE

## 2021-04-06 PROCEDURE — 93000 ELECTROCARDIOGRAM COMPLETE: CPT | Performed by: INTERNAL MEDICINE

## 2021-04-06 NOTE — H&P (VIEW-ONLY)
Cardiology Office Visit      Encounter Date:  04/06/2021    Patient ID:   Ricky Nunez is a 75 y.o. male.    Reason For Followup:  Shortness of breath  Coronary artery disease  Hypertension    Brief Clinical History:  Dear Dr. Mora, Dc Bearden MD    I had the pleasure of seeing Ricky Nunez today. As you are well aware, this is a 75 y.o. male with a past medical history that is significant for history of hypertension and hyperlipidemia history of acute myocardial infarction cardiac catheterization showed evidence of a severe 2 vessel coronary artery disease with significant stenosis involving the diagonal branch of the LAD with subtotal occlusion that was the culprit lesion for the acute myocardial infarction successfully treated with a placement of a drug eluting stent. Repeat Cardiac catheterization at that time showed evidence of residual disease involving the LAD and also a right coronary artery, His IVUS evaluation of the LAD showed a significant stenosis patient underwent a successful coronary artery bypass surgery patient did very well.     Stress test with no evidence of any significant inducible ischemia  echocardiogram with normal LV systolic function and diastolic dysfunction    Interval History:  Patient denies any symptoms of chest pain  dizziness or syncope  Complaining of significant shortness of breath and dyspnea on exertion which is progressively getting worse  Significant shortness of breath  Significant functional limitation  Assessment & Plan    Impressions:  Shortness of breath and dyspnea on exertion which is progressively getting worse  Hypertension  Coronary artery disease  Prior coronary artery bypass surgery  Obesity  Obstructive sleep apnea  Paroxysmal atrial fibrillation/initial intent was to cardiovert patient in sinus rhythm to see if it will help with symptoms but patient spontaneously reverted back to sinus rhythm but now back in A. fib with rate  "control    Recommendations:  Continued aggressive risk factor modification  Need for weight loss discussed with the patient   continue regular exercise   continue current medical therapy and regular exercise  Start patient on anticoagulation therapy for stroke prophylaxis for atrial fibrillation  Risk benefits and alternatives were anticoagulation therapy reviewed and discussed with the patient  Echocardiogram to assess the LV systolic function rule out any significant cardiomyopathy from the newly diagnosed atrial fibrillation  Stress test with no significant inducible ischemia  With ongoing significant change in the patient's symptoms though the stress test was negative plan at this time is to proceed with the further invasive work-up and recommend a cardiac catheterization to see if there is significant obstructive coronary artery disease contributing to patient's symptoms    Continue Coumadin and close monitoring of PT/INR  Continue close monitoring  Further recommendations based on the results of the cardiac catheterization  Follow-up in office in 2 months    Objective:    Vitals:  Vitals:    04/06/21 1005   BP: 172/75   BP Location: Left arm   Pulse: 60   Resp: 18   SpO2: 95%   Weight: 120 kg (265 lb)   Height: 177.8 cm (70\")       Physical Exam:    General: Alert, cooperative, no distress, appears stated age  Head:  Normocephalic, atraumatic, mucous membranes moist  Eyes:  Conjunctiva/corneas clear, EOM's intact     Neck:  Supple,  no adenopathy;      Lungs: Clear to auscultation bilaterally, no wheezes rhonchi rales are noted  Chest wall: No tenderness  Heart::  Regular rate and rhythm, S1 and S2 normal, no murmur, rub or gallop  Abdomen: Soft, non-tender, nondistended bowel sounds active  Extremities: No cyanosis, clubbing, or edema  Pulses: 2+ and symmetric all extremities  Skin:  No rashes or lesions  Neuro/psych: A&O x3. CN II through XII are grossly intact with appropriate affect      Allergies:  No " Known Allergies    Medication Review:     Current Outpatient Medications:   •  albuterol (PROVENTIL HFA;VENTOLIN HFA) 108 (90 Base) MCG/ACT inhaler, Inhale 2 puffs Every 4 (Four) Hours As Needed for Wheezing., Disp: , Rfl:   •  ALPRAZolam (XANAX) 0.25 MG tablet, Take 0.25 mg by mouth 2 (Two) Times a Day As Needed for Anxiety., Disp: , Rfl:   •  amiodarone (PACERONE) 200 MG tablet, Take 1 tablet by mouth Daily., Disp: 30 tablet, Rfl: 1  •  amLODIPine (NORVASC) 10 MG tablet, Take 10 mg by mouth Daily., Disp: , Rfl:   •  aspirin 81 MG EC tablet, Take 81 mg by mouth Daily., Disp: , Rfl:   •  atorvastatin (LIPITOR) 80 MG tablet, Take 80 mg by mouth Every Night., Disp: , Rfl:   •  carvedilol (COREG) 6.25 MG tablet, Take 6.25 mg by mouth 2 (Two) Times a Day With Meals., Disp: , Rfl:   •  escitalopram (LEXAPRO) 10 MG tablet, Take 10 mg by mouth Daily., Disp: , Rfl:   •  glipizide (GLUCOTROL XL) 10 MG 24 hr tablet, Take 10 mg by mouth Daily., Disp: , Rfl:   •  metFORMIN (GLUCOPHAGE) 1000 MG tablet, Take 1,000 mg by mouth 2 (Two) Times a Day With Meals., Disp: , Rfl:   •  omeprazole (priLOSEC) 20 MG capsule, Take 20 mg by mouth Daily., Disp: , Rfl:   •  warfarin (COUMADIN) 5 MG tablet, Take 5 mg by mouth 3 (Three) Times a Week. Sunday, Tuesday, and Thursday, Disp: , Rfl:   •  warfarin (COUMADIN) 7.5 MG tablet, Take 7.5 mg by mouth 4 (Four) Times a Week. Monday, Wednesday, Friday, and Saturday, Disp: , Rfl:     Family History:  Family History   Problem Relation Age of Onset   • Heart disease Mother    • Malig Hyperthermia Neg Hx        Past Medical History:  Past Medical History:   Diagnosis Date   • Acid reflux    • Anxiety    • Arthritis    • Cancer (CMS/HCC)     SKIN   • COPD (chronic obstructive pulmonary disease) (CMS/HCC)    • Coronary artery disease    • Depression    • Heart attack (CMS/HCC)    • Hyperlipidemia    • Hypertension    • Knee pain     LEFT   • Seasonal allergies    • Sleep apnea     CPAP       Past  surgical History:  Past Surgical History:   Procedure Laterality Date   • CHOLECYSTECTOMY OPEN     • CORONARY ARTERY BYPASS GRAFT     • KNEE SURGERY Left    • GA TOTAL KNEE ARTHROPLASTY Left 10/10/2017    Procedure: LT TOTAL KNEE ARTHROPLASTY;  Surgeon: Maksim Pinto MD;  Location: [facility];  Service: Orthopedics       Social History:  Social History     Socioeconomic History   • Marital status:      Spouse name: Not on file   • Number of children: Not on file   • Years of education: Not on file   • Highest education level: Not on file   Tobacco Use   • Smoking status: Former Smoker     Packs/day: 2.00     Years: 50.00     Pack years: 100.00     Types: Cigarettes     Quit date: 2015     Years since quittin.5   • Smokeless tobacco: Never Used   Vaping Use   • Vaping Use: Never used   Substance and Sexual Activity   • Alcohol use: No   • Drug use: No   • Sexual activity: Defer       Review of Systems:  The following systems were reviewed as they relate to the cardiovascular system: Constitutional, Eyes, ENT, Cardiovascular, Respiratory, Gastrointestinal, Integumentary, Neurological, Psychiatric, Hematologic, Endocrine, Musculoskeletal, and Genitourinary. The pertinent cardiovascular findings are reported above with all other cardiovascular points within those systems being negative.    Diagnostic Study Review:     Current Electrocardiogram:    ECG 12 Lead    Date/Time: 2021 10:23 AM  Performed by: Staci Estrada MD  Authorized by: Staci Estrada MD   Comparison: compared with previous ECG   Similar to previous ECG  Rhythm: atrial fibrillation  Rate: normal  BPM: 68  Conduction: conduction normal  QRS axis: normal  Other findings: non-specific ST-T wave changes    Clinical impression: abnormal EKG              NOTE: The following portions of the patient's history were reviewed and updated this visit as appropriate: allergies, current medications, past family history,  past medical history, past social history, past surgical history and problem list.

## 2021-04-13 ENCOUNTER — LAB (OUTPATIENT)
Dept: LAB | Facility: HOSPITAL | Age: 76
End: 2021-04-13

## 2021-04-13 ENCOUNTER — HOSPITAL ENCOUNTER (OUTPATIENT)
Dept: GENERAL RADIOLOGY | Facility: HOSPITAL | Age: 76
Discharge: HOME | End: 2021-04-13

## 2021-04-13 DIAGNOSIS — I25.10 CORONARY ARTERY DISEASE INVOLVING NATIVE CORONARY ARTERY OF NATIVE HEART WITHOUT ANGINA PECTORIS: ICD-10-CM

## 2021-04-13 DIAGNOSIS — I48.11 LONGSTANDING PERSISTENT ATRIAL FIBRILLATION (HCC): ICD-10-CM

## 2021-04-13 DIAGNOSIS — I10 ESSENTIAL HYPERTENSION: ICD-10-CM

## 2021-04-13 DIAGNOSIS — I48.20 ATRIAL FIBRILLATION, CHRONIC (HCC): ICD-10-CM

## 2021-04-13 LAB
ANION GAP SERPL CALCULATED.3IONS-SCNC: 9.2 MMOL/L (ref 5–15)
BASOPHILS # BLD AUTO: 0.05 10*3/MM3 (ref 0–0.2)
BASOPHILS NFR BLD AUTO: 0.8 % (ref 0–1.5)
BUN SERPL-MCNC: 14 MG/DL (ref 8–23)
BUN/CREAT SERPL: 14.1 (ref 7–25)
CALCIUM SPEC-SCNC: 9.3 MG/DL (ref 8.6–10.5)
CHLORIDE SERPL-SCNC: 101 MMOL/L (ref 98–107)
CO2 SERPL-SCNC: 22.8 MMOL/L (ref 22–29)
CREAT SERPL-MCNC: 0.99 MG/DL (ref 0.76–1.27)
DEPRECATED RDW RBC AUTO: 45.7 FL (ref 37–54)
EOSINOPHIL # BLD AUTO: 0.17 10*3/MM3 (ref 0–0.4)
EOSINOPHIL NFR BLD AUTO: 2.8 % (ref 0.3–6.2)
ERYTHROCYTE [DISTWIDTH] IN BLOOD BY AUTOMATED COUNT: 15 % (ref 12.3–15.4)
GFR SERPL CREATININE-BSD FRML MDRD: 74 ML/MIN/1.73
GLUCOSE SERPL-MCNC: 123 MG/DL (ref 65–99)
HCT VFR BLD AUTO: 40 % (ref 37.5–51)
HGB BLD-MCNC: 13.4 G/DL (ref 13–17.7)
IMM GRANULOCYTES # BLD AUTO: 0.04 10*3/MM3 (ref 0–0.05)
IMM GRANULOCYTES NFR BLD AUTO: 0.6 % (ref 0–0.5)
INR PPP: 0.99 (ref 2–3)
LYMPHOCYTES # BLD AUTO: 1.31 10*3/MM3 (ref 0.7–3.1)
LYMPHOCYTES NFR BLD AUTO: 21.2 % (ref 19.6–45.3)
MCH RBC QN AUTO: 28.3 PG (ref 26.6–33)
MCHC RBC AUTO-ENTMCNC: 33.5 G/DL (ref 31.5–35.7)
MCV RBC AUTO: 84.4 FL (ref 79–97)
MONOCYTES # BLD AUTO: 0.6 10*3/MM3 (ref 0.1–0.9)
MONOCYTES NFR BLD AUTO: 9.7 % (ref 5–12)
NEUTROPHILS NFR BLD AUTO: 4 10*3/MM3 (ref 1.7–7)
NEUTROPHILS NFR BLD AUTO: 64.9 % (ref 42.7–76)
NRBC BLD AUTO-RTO: 0 /100 WBC (ref 0–0.2)
PLATELET # BLD AUTO: 227 10*3/MM3 (ref 140–450)
PMV BLD AUTO: 9.7 FL (ref 6–12)
POTASSIUM SERPL-SCNC: 4.2 MMOL/L (ref 3.5–5.2)
PROTHROMBIN TIME: 10.9 SECONDS (ref 19.4–28.5)
RBC # BLD AUTO: 4.74 10*6/MM3 (ref 4.14–5.8)
SODIUM SERPL-SCNC: 133 MMOL/L (ref 136–145)
WBC # BLD AUTO: 6.17 10*3/MM3 (ref 3.4–10.8)

## 2021-04-13 PROCEDURE — 85025 COMPLETE CBC W/AUTO DIFF WBC: CPT

## 2021-04-13 PROCEDURE — 36415 COLL VENOUS BLD VENIPUNCTURE: CPT

## 2021-04-13 PROCEDURE — 85610 PROTHROMBIN TIME: CPT

## 2021-04-13 PROCEDURE — C9803 HOPD COVID-19 SPEC COLLECT: HCPCS

## 2021-04-13 PROCEDURE — U0004 COV-19 TEST NON-CDC HGH THRU: HCPCS

## 2021-04-13 PROCEDURE — 71046 X-RAY EXAM CHEST 2 VIEWS: CPT

## 2021-04-13 PROCEDURE — 80048 BASIC METABOLIC PNL TOTAL CA: CPT

## 2021-04-13 PROCEDURE — U0005 INFEC AGEN DETEC AMPLI PROBE: HCPCS

## 2021-04-14 LAB — SARS-COV-2 ORF1AB RESP QL NAA+PROBE: NOT DETECTED

## 2021-04-15 ENCOUNTER — HOSPITAL ENCOUNTER (OUTPATIENT)
Facility: HOSPITAL | Age: 76
Setting detail: HOSPITAL OUTPATIENT SURGERY
Discharge: HOME | End: 2021-04-15
Attending: INTERNAL MEDICINE | Admitting: INTERNAL MEDICINE

## 2021-04-15 VITALS
HEART RATE: 58 BPM | HEIGHT: 70 IN | OXYGEN SATURATION: 95 % | SYSTOLIC BLOOD PRESSURE: 165 MMHG | RESPIRATION RATE: 15 BRPM | TEMPERATURE: 98.9 F | WEIGHT: 262.57 LBS | BODY MASS INDEX: 37.59 KG/M2 | DIASTOLIC BLOOD PRESSURE: 66 MMHG

## 2021-04-15 DIAGNOSIS — I48.11 LONGSTANDING PERSISTENT ATRIAL FIBRILLATION (HCC): ICD-10-CM

## 2021-04-15 DIAGNOSIS — I48.20 ATRIAL FIBRILLATION, CHRONIC (HCC): ICD-10-CM

## 2021-04-15 DIAGNOSIS — I25.10 CORONARY ARTERY DISEASE INVOLVING NATIVE CORONARY ARTERY OF NATIVE HEART WITHOUT ANGINA PECTORIS: ICD-10-CM

## 2021-04-15 DIAGNOSIS — I10 ESSENTIAL HYPERTENSION: ICD-10-CM

## 2021-04-15 LAB — GLUCOSE BLDC GLUCOMTR-MCNC: 124 MG/DL (ref 70–105)

## 2021-04-15 PROCEDURE — 99152 MOD SED SAME PHYS/QHP 5/>YRS: CPT | Performed by: INTERNAL MEDICINE

## 2021-04-15 PROCEDURE — C1769 GUIDE WIRE: HCPCS | Performed by: INTERNAL MEDICINE

## 2021-04-15 PROCEDURE — 25010000002 MIDAZOLAM PER 1 MG: Performed by: INTERNAL MEDICINE

## 2021-04-15 PROCEDURE — 99153 MOD SED SAME PHYS/QHP EA: CPT | Performed by: INTERNAL MEDICINE

## 2021-04-15 PROCEDURE — 93459 L HRT ART/GRFT ANGIO: CPT | Performed by: INTERNAL MEDICINE

## 2021-04-15 PROCEDURE — 25010000002 FENTANYL CITRATE (PF) 100 MCG/2ML SOLUTION: Performed by: INTERNAL MEDICINE

## 2021-04-15 PROCEDURE — 0 IOPAMIDOL PER 1 ML: Performed by: INTERNAL MEDICINE

## 2021-04-15 PROCEDURE — 82962 GLUCOSE BLOOD TEST: CPT

## 2021-04-15 PROCEDURE — C1760 CLOSURE DEV, VASC: HCPCS | Performed by: INTERNAL MEDICINE

## 2021-04-15 PROCEDURE — C1894 INTRO/SHEATH, NON-LASER: HCPCS | Performed by: INTERNAL MEDICINE

## 2021-04-15 RX ORDER — FENTANYL CITRATE 50 UG/ML
INJECTION, SOLUTION INTRAMUSCULAR; INTRAVENOUS AS NEEDED
Status: DISCONTINUED | OUTPATIENT
Start: 2021-04-15 | End: 2021-04-15 | Stop reason: HOSPADM

## 2021-04-15 RX ORDER — ONDANSETRON 4 MG/1
4 TABLET, FILM COATED ORAL EVERY 6 HOURS PRN
Status: DISCONTINUED | OUTPATIENT
Start: 2021-04-15 | End: 2021-04-15 | Stop reason: HOSPADM

## 2021-04-15 RX ORDER — SODIUM CHLORIDE 9 MG/ML
INJECTION, SOLUTION INTRAVENOUS CONTINUOUS PRN
Status: COMPLETED | OUTPATIENT
Start: 2021-04-15 | End: 2021-04-15

## 2021-04-15 RX ORDER — ONDANSETRON 2 MG/ML
4 INJECTION INTRAMUSCULAR; INTRAVENOUS EVERY 6 HOURS PRN
Status: DISCONTINUED | OUTPATIENT
Start: 2021-04-15 | End: 2021-04-15 | Stop reason: HOSPADM

## 2021-04-15 RX ORDER — MIDAZOLAM HYDROCHLORIDE 1 MG/ML
INJECTION INTRAMUSCULAR; INTRAVENOUS AS NEEDED
Status: DISCONTINUED | OUTPATIENT
Start: 2021-04-15 | End: 2021-04-15 | Stop reason: HOSPADM

## 2021-04-15 RX ORDER — LIDOCAINE HYDROCHLORIDE 20 MG/ML
INJECTION, SOLUTION INFILTRATION; PERINEURAL AS NEEDED
Status: DISCONTINUED | OUTPATIENT
Start: 2021-04-15 | End: 2021-04-15 | Stop reason: HOSPADM

## 2021-04-15 RX ORDER — ACETAMINOPHEN 325 MG/1
650 TABLET ORAL EVERY 4 HOURS PRN
Status: DISCONTINUED | OUTPATIENT
Start: 2021-04-15 | End: 2021-04-15 | Stop reason: HOSPADM

## 2021-04-15 NOTE — DISCHARGE INSTRUCTIONS
Post Cath Instructions    Call Dr. Escoto’s office to schedule a follow up appointment in 2-4 weeks at [phone].  Specific Physician Instructions: ***    1) Drink plenty of fluids for the next 24 hours.  This helps to eliminate the dye used in your procedure through urination.  You may resume a normal diet; however, try to avoid foods that would cause gas or constipation.    2) Sedative medication given to you during your catheterization may decrease your judgement and reaction time for up to 24-48 hours.  Therefore:  a. DO NOT drive or operate hazardous machinery (48 hours)  b. DO NOT consume alcoholic beverages  c. DO NOT make any important/legal decisions  d. Have someone stay with you for at least 24 hours    3) To allow proper healing and prevent bleeding, the following activities are to be strictly avoided for the next 24-48 hours:  a. Excessive bending at wound site  b. Straining (anything that would tense up muscles around the affected puncture site)  c. Lifting objects greater than 5 pounds, pushing, or pulling for 5 days  i. For Arm Cases:  1. No flexing at the puncture site, such as hammering, golfing, bowling, or swinging any objects  ii. For Groin Cases:  1. Refrain from sexual activity  2. Refrain from running or vigorous walking  3. No prolonged sitting or standing  4. Limit stair climbing as much as possible    4) Keep the puncture site clean and dry.  You may remove the dressing tomorrow and replace it with a band-aid for at least one additional day.  Gently clean the site with mild soap and water.  No scrubbing/rubbing and lightly pat the area dry.  Showers are acceptable; however, avoid submerging in water (tub baths, hot tubs, swimming pools, dishwater, etc…) for at least one week.  The site should be completely healed before resuming these activities to reduce the risk of infection.  Check the site often.  Watch for signs and symptoms of infection and notify your physician if any of the  following occur:  a. Bleeding or an increase in swelling at the puncture site  b. Fever  c. Increased soreness around puncture site  d. Foul odor or significant drainage from the puncture site  e. Swelling, redness, or warmth at the puncture site    **A bruise or small “pea sized” lump under the skin at the puncture site is not unusual.  This should disappear within 3-4 weeks.**  5) CONTACT YOUR PHYSICIAN OR CALL 911 IF YOU EXPERIENCE ANY OF THE FOLLOWING:  a. Increased angina (chest pain) or frequent sensations of pressure, burning, pain, or other discomfort in the chest, arm, jaws, or stomach  b. Lightheadedness, dizziness, faint feeling, sweating, or difficulty breathing  c. Odd sensation changes like numbness, tingling, coldness, or pain in the arm or leg in which the catheter was inserted  d. Limb in which the catheter was inserted becomes pale/bluish in color    IMPORTANT:  Although this occurs very rarely, if you should develop bright red or excessive bleeding, feel a “pop” inside at the insertion site, or notice a sudden increase in swelling larger than a walnut, you should call 911.  Hold continuous firm pressure to the access site until emergency personnel arrive.  It is best if someone else can do this for you.

## 2021-04-20 ENCOUNTER — ANTICOAGULATION VISIT (OUTPATIENT)
Dept: CARDIOLOGY | Facility: CLINIC | Age: 76
End: 2021-04-20

## 2021-04-20 DIAGNOSIS — I48.20 ATRIAL FIBRILLATION, CHRONIC (HCC): Primary | ICD-10-CM

## 2021-04-20 LAB — INR PPP: 1.1 (ref 0.9–1.1)

## 2021-04-20 PROCEDURE — 36416 COLLJ CAPILLARY BLOOD SPEC: CPT | Performed by: INTERNAL MEDICINE

## 2021-04-20 PROCEDURE — 85610 PROTHROMBIN TIME: CPT | Performed by: INTERNAL MEDICINE

## 2021-04-27 ENCOUNTER — ANTICOAGULATION VISIT (OUTPATIENT)
Dept: CARDIOLOGY | Facility: CLINIC | Age: 76
End: 2021-04-27

## 2021-04-27 DIAGNOSIS — I48.20 ATRIAL FIBRILLATION, CHRONIC (HCC): Primary | ICD-10-CM

## 2021-04-27 LAB — INR PPP: 2 (ref 0.9–1.1)

## 2021-04-27 PROCEDURE — 36416 COLLJ CAPILLARY BLOOD SPEC: CPT | Performed by: INTERNAL MEDICINE

## 2021-04-27 PROCEDURE — 85610 PROTHROMBIN TIME: CPT | Performed by: INTERNAL MEDICINE

## 2021-05-11 ENCOUNTER — ANTICOAGULATION VISIT (OUTPATIENT)
Dept: CARDIOLOGY | Facility: CLINIC | Age: 76
End: 2021-05-11

## 2021-05-11 DIAGNOSIS — I48.20 ATRIAL FIBRILLATION, CHRONIC (HCC): Primary | ICD-10-CM

## 2021-05-11 LAB — INR PPP: 3.6 (ref 0.9–1.1)

## 2021-05-11 PROCEDURE — 85610 PROTHROMBIN TIME: CPT | Performed by: INTERNAL MEDICINE

## 2021-05-11 PROCEDURE — 36416 COLLJ CAPILLARY BLOOD SPEC: CPT | Performed by: INTERNAL MEDICINE

## 2021-05-21 ENCOUNTER — ANTICOAGULATION VISIT (OUTPATIENT)
Dept: CARDIOLOGY | Facility: CLINIC | Age: 76
End: 2021-05-21

## 2021-05-21 ENCOUNTER — OFFICE VISIT (OUTPATIENT)
Dept: CARDIOLOGY | Facility: CLINIC | Age: 76
End: 2021-05-21

## 2021-05-21 VITALS
HEART RATE: 63 BPM | DIASTOLIC BLOOD PRESSURE: 68 MMHG | HEIGHT: 70 IN | BODY MASS INDEX: 37.65 KG/M2 | RESPIRATION RATE: 18 BRPM | OXYGEN SATURATION: 90 % | WEIGHT: 263 LBS | SYSTOLIC BLOOD PRESSURE: 170 MMHG

## 2021-05-21 DIAGNOSIS — I25.10 CORONARY ARTERY DISEASE INVOLVING NATIVE CORONARY ARTERY OF NATIVE HEART WITHOUT ANGINA PECTORIS: ICD-10-CM

## 2021-05-21 DIAGNOSIS — I48.11 LONGSTANDING PERSISTENT ATRIAL FIBRILLATION (HCC): Primary | ICD-10-CM

## 2021-05-21 DIAGNOSIS — E78.2 MIXED HYPERLIPIDEMIA: ICD-10-CM

## 2021-05-21 DIAGNOSIS — I10 ESSENTIAL HYPERTENSION: ICD-10-CM

## 2021-05-21 LAB — INR PPP: 3.4 (ref 0.9–1.1)

## 2021-05-21 PROCEDURE — 36416 COLLJ CAPILLARY BLOOD SPEC: CPT | Performed by: INTERNAL MEDICINE

## 2021-05-21 PROCEDURE — 99214 OFFICE O/P EST MOD 30 MIN: CPT | Performed by: INTERNAL MEDICINE

## 2021-05-21 PROCEDURE — 85610 PROTHROMBIN TIME: CPT | Performed by: INTERNAL MEDICINE

## 2021-05-21 RX ORDER — BUMETANIDE 1 MG/1
1 TABLET ORAL DAILY
COMMUNITY
Start: 2021-04-28 | End: 2021-06-25 | Stop reason: SDUPTHER

## 2021-05-21 NOTE — PROGRESS NOTES
Cardiology Office Visit      Encounter Date:  05/21/2021    Patient ID:   Ricky Nunez is a 76 y.o. male.    Reason For Followup:  Shortness of breath  Coronary artery disease  Hypertension    Brief Clinical History:  Dear Dr. Mora, Dc Bearden MD    I had the pleasure of seeing Ricky Nunez today. As you are well aware, this is a 76 y.o. male with a past medical history that is significant for history of hypertension and hyperlipidemia history of acute myocardial infarction cardiac catheterization showed evidence of a severe 2 vessel coronary artery disease with significant stenosis involving the diagonal branch of the LAD with subtotal occlusion that was the culprit lesion for the acute myocardial infarction successfully treated with a placement of a drug eluting stent. Repeat Cardiac catheterization at that time showed evidence of residual disease involving the LAD and also a right coronary artery, His IVUS evaluation of the LAD showed a significant stenosis patient underwent a successful coronary artery bypass surgery patient did very well.     Stress test with no evidence of any significant inducible ischemia  echocardiogram with normal LV systolic function and diastolic dysfunction    Impressions:/April 2021  Severe native two-vessel coronary artery disease  Significant obstructive disease involving the mid circumflex mid LAD and diagonal branch of the LAD  Patent LIMA to the LAD  Patent vein graft to the diagonal  Patent vein graft to the marginal  Normal LV systolic function with estimated LV ejection fraction of 60%  Elevated left-sided filling pressures    Interpretation Summary    · Left ventricular wall thickness is consistent with concentric hypertrophy.  · Estimated left ventricular EF = 60% Estimated left ventricular EF was in agreement with the calculated left ventricular EF. Left ventricular systolic function is normal.  · Mild dilation of the aortic root is present.  · Estimated right  "ventricular systolic pressure from tricuspid regurgitation is normal (<35 mmHg).  · Left ventricular diastolic function is consistent with (grade I) impaired relaxation.       Interval History:  Patient denies any symptoms of chest pain  dizziness or syncope  Complaining of significant shortness of breath and dyspnea on exertion which is progressively getting worse  Significant shortness of breath  Significant functional limitation  Assessment & Plan    Impressions:  Shortness of breath and dyspnea on exertion which is progressively getting worse  Hypertension  Coronary artery disease  Prior coronary artery bypass surgery  Obesity  Obstructive sleep apnea  Paroxysmal atrial fibrillation/initial intent was to cardiovert patient in sinus rhythm to see if it will help with symptoms but patient spontaneously reverted back to sinus rhythm but now back in A. fib with rate control    Recommendations:  Continued aggressive risk factor modification  Need for weight loss discussed with the patient   continue regular exercise   continue current medical therapy and regular exercise  Continue anticoagulation therapy for stroke prophylaxis for atrial fibrillation  Cardiac catheterization with a patent bypasses  Resume Bumex to help with the shortness of breath  Follow-up with pulmonary for underlying lung pathology  Continue Coumadin and close monitoring of PT/INR  Continue close monitoring    Follow-up in office in 3 months    Objective:    Vitals:  Vitals:    05/21/21 0928   BP: 170/68   BP Location: Left arm   Pulse: 63   Resp: 18   SpO2: 90%   Weight: 119 kg (263 lb)   Height: 177.8 cm (70\")       Physical Exam:    General: Alert, cooperative, no distress, appears stated age  Head:  Normocephalic, atraumatic, mucous membranes moist  Eyes:  Conjunctiva/corneas clear, EOM's intact     Neck:  Supple,  no adenopathy;      Lungs: Clear to auscultation bilaterally, no wheezes rhonchi rales are noted  Chest wall: No " tenderness  Heart::  Regular rate and rhythm, S1 and S2 normal, displaced LV apical  Abdomen: Soft, non-tender, nondistended bowel sounds active  Extremities: No cyanosis, clubbing, or edema  Pulses: 2+ and symmetric all extremities  Skin:  No rashes or lesions  Neuro/psych: A&O x3. CN II through XII are grossly intact with appropriate affect      Allergies:  No Known Allergies    Medication Review:     Current Outpatient Medications:   •  albuterol (PROVENTIL HFA;VENTOLIN HFA) 108 (90 Base) MCG/ACT inhaler, Inhale 2 puffs Every 4 (Four) Hours As Needed for Wheezing., Disp: , Rfl:   •  ALPRAZolam (XANAX) 0.25 MG tablet, Take 0.25 mg by mouth 2 (Two) Times a Day As Needed for Anxiety., Disp: , Rfl:   •  amiodarone (PACERONE) 200 MG tablet, Take 1 tablet by mouth Daily., Disp: 30 tablet, Rfl: 1  •  amLODIPine (NORVASC) 10 MG tablet, Take 10 mg by mouth Daily., Disp: , Rfl:   •  aspirin 81 MG EC tablet, Take 81 mg by mouth Daily., Disp: , Rfl:   •  atorvastatin (LIPITOR) 80 MG tablet, Take 80 mg by mouth Every Night., Disp: , Rfl:   •  bumetanide (BUMEX) 1 MG tablet, Take 1 mg by mouth Daily., Disp: , Rfl:   •  carvedilol (COREG) 6.25 MG tablet, Take 6.25 mg by mouth 2 (Two) Times a Day With Meals., Disp: , Rfl:   •  escitalopram (LEXAPRO) 10 MG tablet, Take 10 mg by mouth Daily., Disp: , Rfl:   •  glipizide (GLUCOTROL XL) 10 MG 24 hr tablet, Take 10 mg by mouth Daily., Disp: , Rfl:   •  metFORMIN (GLUCOPHAGE) 1000 MG tablet, Take 1,000 mg by mouth 2 (Two) Times a Day With Meals., Disp: , Rfl:   •  omeprazole (priLOSEC) 20 MG capsule, Take 20 mg by mouth Daily., Disp: , Rfl:   •  warfarin (COUMADIN) 5 MG tablet, Take 5 mg by mouth 3 (Three) Times a Week. Sunday, Tuesday, and Thursday, Disp: , Rfl:   •  warfarin (COUMADIN) 7.5 MG tablet, Take 7.5 mg by mouth 4 (Four) Times a Week. Monday, Wednesday, Friday, and Saturday, Disp: , Rfl:     Family History:  Family History   Problem Relation Age of Onset   • Heart disease  Mother    • Malig Hyperthermia Neg Hx        Past Medical History:  Past Medical History:   Diagnosis Date   • Acid reflux    • Anxiety    • Arthritis    • Cancer (CMS/HCC)     SKIN   • COPD (chronic obstructive pulmonary disease) (CMS/HCC)    • Coronary artery disease    • Depression    • Heart attack (CMS/HCC)    • Hyperlipidemia    • Hypertension    • Knee pain     LEFT   • Seasonal allergies    • Sleep apnea     CPAP       Past surgical History:  Past Surgical History:   Procedure Laterality Date   • CARDIAC CATHETERIZATION N/A 4/15/2021    Procedure: Left Heart Cath;  Surgeon: Staci Estrada MD;  Location: [facility] CATH INVASIVE LOCATION;  Service: Cardiovascular;  Laterality: N/A;  with SVG and LIMA injections   • CARDIAC CATHETERIZATION  4/15/2021    Procedure: Saphenous Vein Graft;  Surgeon: Staci Estrada MD;  Location: [facility] CATH INVASIVE LOCATION;  Service: Cardiovascular;;   • CHOLECYSTECTOMY OPEN     • CORONARY ARTERY BYPASS GRAFT     • KNEE SURGERY Left    • LA TOTAL KNEE ARTHROPLASTY Left 10/10/2017    Procedure: LT TOTAL KNEE ARTHROPLASTY;  Surgeon: Maksim Pinto MD;  Location: [facility];  Service: Orthopedics       Social History:  Social History     Socioeconomic History   • Marital status:      Spouse name: Not on file   • Number of children: Not on file   • Years of education: Not on file   • Highest education level: Not on file   Tobacco Use   • Smoking status: Former Smoker     Packs/day: 2.00     Years: 50.00     Pack years: 100.00     Types: Cigarettes     Quit date: 2015     Years since quittin.6   • Smokeless tobacco: Never Used   Vaping Use   • Vaping Use: Never used   Substance and Sexual Activity   • Alcohol use: No   • Drug use: No   • Sexual activity: Defer       Review of Systems:  The following systems were reviewed as they relate to the cardiovascular system: Constitutional, Eyes, ENT, Cardiovascular, Respiratory, Gastrointestinal,  Integumentary, Neurological, Psychiatric, Hematologic, Endocrine, Musculoskeletal, and Genitourinary. The pertinent cardiovascular findings are reported above with all other cardiovascular points within those systems being negative.    Diagnostic Study Review:     Current Electrocardiogram:  Procedures      NOTE: The following portions of the patient's history were reviewed and updated this visit as appropriate: allergies, current medications, past family history, past medical history, past social history, past surgical history and problem list.

## 2021-06-11 ENCOUNTER — ANTICOAGULATION VISIT (OUTPATIENT)
Dept: CARDIOLOGY | Facility: CLINIC | Age: 76
End: 2021-06-11

## 2021-06-11 DIAGNOSIS — I48.11 LONGSTANDING PERSISTENT ATRIAL FIBRILLATION (HCC): Primary | ICD-10-CM

## 2021-06-11 LAB — INR PPP: 3.8 (ref 0.9–1.1)

## 2021-06-11 PROCEDURE — 36416 COLLJ CAPILLARY BLOOD SPEC: CPT | Performed by: INTERNAL MEDICINE

## 2021-06-11 PROCEDURE — 85610 PROTHROMBIN TIME: CPT | Performed by: INTERNAL MEDICINE

## 2021-06-25 ENCOUNTER — ANTICOAGULATION VISIT (OUTPATIENT)
Dept: CARDIOLOGY | Facility: CLINIC | Age: 76
End: 2021-06-25

## 2021-06-25 DIAGNOSIS — I48.11 LONGSTANDING PERSISTENT ATRIAL FIBRILLATION (HCC): Primary | ICD-10-CM

## 2021-06-25 LAB — INR PPP: 3.2 (ref 0.9–1.1)

## 2021-06-25 PROCEDURE — 85610 PROTHROMBIN TIME: CPT | Performed by: INTERNAL MEDICINE

## 2021-06-25 PROCEDURE — 36416 COLLJ CAPILLARY BLOOD SPEC: CPT | Performed by: INTERNAL MEDICINE

## 2021-06-25 RX ORDER — BUMETANIDE 1 MG/1
1 TABLET ORAL DAILY
Qty: 30 TABLET | Refills: 2 | Status: SHIPPED | OUTPATIENT
Start: 2021-06-25

## 2021-07-13 ENCOUNTER — ANTICOAGULATION VISIT (OUTPATIENT)
Dept: CARDIOLOGY | Facility: CLINIC | Age: 76
End: 2021-07-13

## 2021-07-13 DIAGNOSIS — I48.11 LONGSTANDING PERSISTENT ATRIAL FIBRILLATION (HCC): Primary | ICD-10-CM

## 2021-07-13 LAB — INR PPP: 2.1 (ref 0.9–1.1)

## 2021-07-13 PROCEDURE — 85610 PROTHROMBIN TIME: CPT | Performed by: INTERNAL MEDICINE

## 2021-07-13 PROCEDURE — 36416 COLLJ CAPILLARY BLOOD SPEC: CPT | Performed by: INTERNAL MEDICINE

## 2021-08-10 ENCOUNTER — ANTICOAGULATION VISIT (OUTPATIENT)
Dept: CARDIOLOGY | Facility: CLINIC | Age: 76
End: 2021-08-10

## 2021-08-10 DIAGNOSIS — I48.11 LONGSTANDING PERSISTENT ATRIAL FIBRILLATION (HCC): Primary | ICD-10-CM

## 2021-08-10 LAB — INR PPP: 2.2 (ref 0.9–1.1)

## 2021-08-10 PROCEDURE — 85610 PROTHROMBIN TIME: CPT | Performed by: INTERNAL MEDICINE

## 2021-08-10 PROCEDURE — 36416 COLLJ CAPILLARY BLOOD SPEC: CPT | Performed by: INTERNAL MEDICINE

## 2021-09-07 ENCOUNTER — ANTICOAGULATION VISIT (OUTPATIENT)
Dept: CARDIOLOGY | Facility: CLINIC | Age: 76
End: 2021-09-07

## 2021-09-07 DIAGNOSIS — I48.11 LONGSTANDING PERSISTENT ATRIAL FIBRILLATION (HCC): Primary | ICD-10-CM

## 2021-09-07 LAB — INR PPP: 2.1 (ref 0.9–1.1)

## 2021-09-07 PROCEDURE — 36416 COLLJ CAPILLARY BLOOD SPEC: CPT | Performed by: INTERNAL MEDICINE

## 2021-09-07 PROCEDURE — 85610 PROTHROMBIN TIME: CPT | Performed by: INTERNAL MEDICINE

## 2021-10-05 ENCOUNTER — ANTICOAGULATION VISIT (OUTPATIENT)
Dept: CARDIOLOGY | Facility: CLINIC | Age: 76
End: 2021-10-05

## 2021-10-05 DIAGNOSIS — I48.11 LONGSTANDING PERSISTENT ATRIAL FIBRILLATION (HCC): Primary | ICD-10-CM

## 2021-10-05 LAB — INR PPP: 1.8 (ref 0.9–1.1)

## 2021-10-05 PROCEDURE — 36416 COLLJ CAPILLARY BLOOD SPEC: CPT | Performed by: INTERNAL MEDICINE

## 2021-10-05 PROCEDURE — 85610 PROTHROMBIN TIME: CPT | Performed by: INTERNAL MEDICINE

## 2021-10-06 ENCOUNTER — OFFICE VISIT (OUTPATIENT)
Dept: CARDIOLOGY | Facility: CLINIC | Age: 76
End: 2021-10-06

## 2021-10-06 VITALS
OXYGEN SATURATION: 93 % | HEIGHT: 70 IN | DIASTOLIC BLOOD PRESSURE: 74 MMHG | BODY MASS INDEX: 37.37 KG/M2 | HEART RATE: 66 BPM | SYSTOLIC BLOOD PRESSURE: 171 MMHG | WEIGHT: 261 LBS

## 2021-10-06 DIAGNOSIS — E78.2 MIXED HYPERLIPIDEMIA: ICD-10-CM

## 2021-10-06 DIAGNOSIS — I10 ESSENTIAL HYPERTENSION: ICD-10-CM

## 2021-10-06 DIAGNOSIS — I25.10 CORONARY ARTERY DISEASE INVOLVING NATIVE CORONARY ARTERY OF NATIVE HEART WITHOUT ANGINA PECTORIS: ICD-10-CM

## 2021-10-06 DIAGNOSIS — I10 PRIMARY HYPERTENSION: ICD-10-CM

## 2021-10-06 DIAGNOSIS — I48.11 LONGSTANDING PERSISTENT ATRIAL FIBRILLATION (HCC): Primary | ICD-10-CM

## 2021-10-06 PROCEDURE — 99214 OFFICE O/P EST MOD 30 MIN: CPT | Performed by: INTERNAL MEDICINE

## 2021-10-06 PROCEDURE — 93000 ELECTROCARDIOGRAM COMPLETE: CPT | Performed by: INTERNAL MEDICINE

## 2021-10-06 NOTE — PROGRESS NOTES
Cardiology Office Visit      Encounter Date:  10/06/2021    Patient ID:   Ricky Nunez is a 76 y.o. male.    Reason For Followup:  Shortness of breath  Coronary artery disease  Hypertension    Brief Clinical History:  Dear Dr. Mora, Dc Bearden MD    I had the pleasure of seeing Ricky Nunez today. As you are well aware, this is a 76 y.o. male with a past medical history that is significant for history of hypertension and hyperlipidemia history of acute myocardial infarction cardiac catheterization showed evidence of a severe 2 vessel coronary artery disease with significant stenosis involving the diagonal branch of the LAD with subtotal occlusion that was the culprit lesion for the acute myocardial infarction successfully treated with a placement of a drug eluting stent. Repeat Cardiac catheterization at that time showed evidence of residual disease involving the LAD and also a right coronary artery, His IVUS evaluation of the LAD showed a significant stenosis patient underwent a successful coronary artery bypass surgery patient did very well.     Stress test with no evidence of any significant inducible ischemia  echocardiogram with normal LV systolic function and diastolic dysfunction    Impressions:/April 2021  Severe native two-vessel coronary artery disease  Significant obstructive disease involving the mid circumflex mid LAD and diagonal branch of the LAD  Patent LIMA to the LAD  Patent vein graft to the diagonal  Patent vein graft to the marginal  Normal LV systolic function with estimated LV ejection fraction of 60%  Elevated left-sided filling pressures    Interpretation Summary    · Left ventricular wall thickness is consistent with concentric hypertrophy.  · Estimated left ventricular EF = 60% Estimated left ventricular EF was in agreement with the calculated left ventricular EF. Left ventricular systolic function is normal.  · Mild dilation of the aortic root is present.  · Estimated right  "ventricular systolic pressure from tricuspid regurgitation is normal (<35 mmHg).  · Left ventricular diastolic function is consistent with (grade I) impaired relaxation.       Interval History:  Patient denies any symptoms of chest pain  dizziness or syncope  Shortness of breath is somewhat better with the diuretics along with the maintaining sinus rhythm and also home supplemental oxygen therapy    Assessment & Plan    Impressions:  Shortness of breath and dyspnea on exertion which is progressively getting worse  Hypertension  Coronary artery disease  Prior coronary artery bypass surgery  Obesity  Obstructive sleep apnea  Paroxysmal atrial fibrillation/initial intent was to cardiovert patient in sinus rhythm to see if it will help with symptoms but patient spontaneously reverted back to sinus rhythm but now back in A. fib with rate control  Patient is currently maintaining sinus rhythm  Tolerating anticoagulation therapy  Recommendations:  Continued aggressive risk factor modification  Need for weight loss discussed with the patient   continue regular exercise   continue current medical therapy and regular exercise  Continue anticoagulation therapy for stroke prophylaxis for atrial fibrillation  Cardiac catheterization with a patent bypasses  Continue current dose of diuretics but follow-up with primary care physician for renal function assessment and close monitoring on diuretic therapy  Advised patient to continue follow-up with pulmonary CT chest findings reviewed and discussed with patient  Discontinue amiodarone  Continue current dose of beta-blockers  Continue Coumadin and close monitoring of PT/INR  Continue close monitoring    Follow-up in office in 6 months  Objective:    Vitals:  Vitals:    10/06/21 1339   BP: 171/74   Pulse: 66   SpO2: 93%   Weight: 118 kg (261 lb)   Height: 177.8 cm (70\")       Physical Exam:    General: Alert, cooperative, no distress, appears stated age  Head:  Normocephalic, " atraumatic, mucous membranes moist  Eyes:  Conjunctiva/corneas clear, EOM's intact     Neck:  Supple,  no adenopathy;      Lungs: Clear to auscultation bilaterally, no wheezes rhonchi rales are noted  Chest wall: No tenderness  Heart::  Regular rate and rhythm, S1 and S2 normal, no murmur, rub or gallop  Abdomen: Soft, non-tender, nondistended bowel sounds active  Extremities: No cyanosis, clubbing, or edema  Pulses: 2+ and symmetric all extremities  Skin:  No rashes or lesions  Neuro/psych: A&O x3. CN II through XII are grossly intact with appropriate affect      Allergies:  No Known Allergies    Medication Review:     Current Outpatient Medications:   •  albuterol (PROVENTIL HFA;VENTOLIN HFA) 108 (90 Base) MCG/ACT inhaler, Inhale 2 puffs Every 4 (Four) Hours As Needed for Wheezing., Disp: , Rfl:   •  ALPRAZolam (XANAX) 0.25 MG tablet, Take 0.25 mg by mouth 2 (Two) Times a Day As Needed for Anxiety., Disp: , Rfl:   •  amiodarone (PACERONE) 200 MG tablet, Take 1 tablet by mouth Daily., Disp: 30 tablet, Rfl: 1  •  amLODIPine (NORVASC) 10 MG tablet, Take 10 mg by mouth Daily., Disp: , Rfl:   •  aspirin 81 MG EC tablet, Take 81 mg by mouth Daily., Disp: , Rfl:   •  atorvastatin (LIPITOR) 80 MG tablet, Take 80 mg by mouth Every Night., Disp: , Rfl:   •  bumetanide (BUMEX) 1 MG tablet, Take 1 tablet by mouth Daily. (Patient taking differently: Take 1 mg by mouth Daily. Two tabs daily), Disp: 30 tablet, Rfl: 2  •  carvedilol (COREG) 6.25 MG tablet, Take 6.25 mg by mouth 2 (Two) Times a Day With Meals., Disp: , Rfl:   •  escitalopram (LEXAPRO) 10 MG tablet, Take 10 mg by mouth Daily., Disp: , Rfl:   •  glipizide (GLUCOTROL XL) 10 MG 24 hr tablet, Take 10 mg by mouth Daily., Disp: , Rfl:   •  metFORMIN (GLUCOPHAGE) 1000 MG tablet, Take 1,000 mg by mouth 2 (Two) Times a Day With Meals., Disp: , Rfl:   •  omeprazole (priLOSEC) 20 MG capsule, Take 20 mg by mouth Daily., Disp: , Rfl:   •  warfarin (COUMADIN) 5 MG tablet, Take 5  mg by mouth 3 (Three) Times a Week. , Tuesday, and Thursday, Disp: , Rfl:   •  warfarin (COUMADIN) 7.5 MG tablet, Take 7.5 mg by mouth 4 (Four) Times a Week. Monday, Wednesday, Friday, and Saturday, Disp: , Rfl:     Family History:  Family History   Problem Relation Age of Onset   • Heart disease Mother    • Malig Hyperthermia Neg Hx        Past Medical History:  Past Medical History:   Diagnosis Date   • Acid reflux    • Anxiety    • Arthritis    • Cancer (HCC)     SKIN   • COPD (chronic obstructive pulmonary disease) (HCC)    • Coronary artery disease    • Depression    • Heart attack (HCC)    • Hyperlipidemia    • Hypertension    • Knee pain     LEFT   • Seasonal allergies    • Sleep apnea     CPAP       Past surgical History:  Past Surgical History:   Procedure Laterality Date   • CARDIAC CATHETERIZATION N/A 4/15/2021    Procedure: Left Heart Cath;  Surgeon: Staci Estrada MD;  Location: [facility] CATH INVASIVE LOCATION;  Service: Cardiovascular;  Laterality: N/A;  with SVG and LIMA injections   • CARDIAC CATHETERIZATION  4/15/2021    Procedure: Saphenous Vein Graft;  Surgeon: Staci Estrada MD;  Location: [facility] CATH INVASIVE LOCATION;  Service: Cardiovascular;;   • CHOLECYSTECTOMY OPEN     • CORONARY ARTERY BYPASS GRAFT     • KNEE SURGERY Left    • RI TOTAL KNEE ARTHROPLASTY Left 10/10/2017    Procedure: LT TOTAL KNEE ARTHROPLASTY;  Surgeon: Maksim Pinto MD;  Location: [facility];  Service: Orthopedics       Social History:  Social History     Socioeconomic History   • Marital status:      Spouse name: Not on file   • Number of children: Not on file   • Years of education: Not on file   • Highest education level: Not on file   Tobacco Use   • Smoking status: Former Smoker     Packs/day: 2.00     Years: 50.00     Pack years: 100.00     Types: Cigarettes     Quit date: 2015     Years since quittin.0   • Smokeless tobacco: Never Used   Vaping Use   • Vaping Use:  Never used   Substance and Sexual Activity   • Alcohol use: No   • Drug use: No   • Sexual activity: Defer       Review of Systems:  The following systems were reviewed as they relate to the cardiovascular system: Constitutional, Eyes, ENT, Cardiovascular, Respiratory, Gastrointestinal, Integumentary, Neurological, Psychiatric, Hematologic, Endocrine, Musculoskeletal, and Genitourinary. The pertinent cardiovascular findings are reported above with all other cardiovascular points within those systems being negative.    Diagnostic Study Review:     Current Electrocardiogram:    ECG 12 Lead    Date/Time: 10/6/2021 2:17 PM  Performed by: Staci Estrada MD  Authorized by: Staci Estrada MD   Comparison: compared with previous ECG   Similar to previous ECG  Rhythm: sinus rhythm  Rate: normal  BPM: 66  Conduction: conduction normal  QRS axis: normal  Other findings: non-specific ST-T wave changes    Clinical impression: abnormal EKG              NOTE: The following portions of the patient's history were reviewed and updated this visit as appropriate: allergies, current medications, past family history, past medical history, past social history, past surgical history and problem list.

## 2021-10-29 ENCOUNTER — ANTICOAGULATION VISIT (OUTPATIENT)
Dept: CARDIOLOGY | Facility: CLINIC | Age: 76
End: 2021-10-29

## 2021-10-29 DIAGNOSIS — I48.11 LONGSTANDING PERSISTENT ATRIAL FIBRILLATION (HCC): Primary | ICD-10-CM

## 2021-10-29 LAB — INR PPP: 2.4 (ref 0.9–1.1)

## 2021-10-29 PROCEDURE — 36416 COLLJ CAPILLARY BLOOD SPEC: CPT | Performed by: INTERNAL MEDICINE

## 2021-10-29 PROCEDURE — 85610 PROTHROMBIN TIME: CPT | Performed by: INTERNAL MEDICINE

## 2021-11-02 RX ORDER — WARFARIN SODIUM 5 MG/1
TABLET ORAL
Qty: 120 TABLET | Refills: 1 | Status: SHIPPED | OUTPATIENT
Start: 2021-11-02 | End: 2022-06-13

## 2021-11-30 ENCOUNTER — ANTICOAGULATION VISIT (OUTPATIENT)
Dept: CARDIOLOGY | Facility: CLINIC | Age: 76
End: 2021-11-30

## 2021-11-30 DIAGNOSIS — I48.11 LONGSTANDING PERSISTENT ATRIAL FIBRILLATION (HCC): Primary | ICD-10-CM

## 2021-11-30 LAB — INR PPP: 1.7 (ref 0.9–1.1)

## 2021-11-30 PROCEDURE — 36416 COLLJ CAPILLARY BLOOD SPEC: CPT | Performed by: INTERNAL MEDICINE

## 2021-11-30 PROCEDURE — 85610 PROTHROMBIN TIME: CPT | Performed by: INTERNAL MEDICINE

## 2021-12-21 ENCOUNTER — ANTICOAGULATION VISIT (OUTPATIENT)
Dept: CARDIOLOGY | Facility: CLINIC | Age: 76
End: 2021-12-21

## 2021-12-21 DIAGNOSIS — I48.11 LONGSTANDING PERSISTENT ATRIAL FIBRILLATION (HCC): Primary | ICD-10-CM

## 2021-12-21 LAB — INR PPP: 1.9 (ref 0.9–1.1)

## 2021-12-21 PROCEDURE — 93793 ANTICOAG MGMT PT WARFARIN: CPT | Performed by: INTERNAL MEDICINE

## 2021-12-21 PROCEDURE — 85610 PROTHROMBIN TIME: CPT | Performed by: INTERNAL MEDICINE

## 2021-12-21 PROCEDURE — 36416 COLLJ CAPILLARY BLOOD SPEC: CPT | Performed by: INTERNAL MEDICINE

## 2022-01-18 ENCOUNTER — ANTICOAGULATION VISIT (OUTPATIENT)
Dept: CARDIOLOGY | Facility: CLINIC | Age: 77
End: 2022-01-18

## 2022-01-18 DIAGNOSIS — I48.11 LONGSTANDING PERSISTENT ATRIAL FIBRILLATION: Primary | ICD-10-CM

## 2022-01-18 LAB — INR PPP: 2 (ref 0.9–1.1)

## 2022-01-18 PROCEDURE — 36416 COLLJ CAPILLARY BLOOD SPEC: CPT | Performed by: INTERNAL MEDICINE

## 2022-01-18 PROCEDURE — 85610 PROTHROMBIN TIME: CPT | Performed by: INTERNAL MEDICINE

## 2022-01-18 PROCEDURE — 93793 ANTICOAG MGMT PT WARFARIN: CPT | Performed by: INTERNAL MEDICINE

## 2022-02-01 ENCOUNTER — OFFICE (AMBULATORY)
Dept: URBAN - METROPOLITAN AREA PATHOLOGY 4 | Facility: PATHOLOGY | Age: 77
End: 2022-02-01

## 2022-02-01 ENCOUNTER — OFFICE (AMBULATORY)
Dept: URBAN - METROPOLITAN AREA PATHOLOGY 4 | Facility: PATHOLOGY | Age: 77
End: 2022-02-01
Payer: COMMERCIAL

## 2022-02-01 ENCOUNTER — ON CAMPUS - OUTPATIENT (AMBULATORY)
Dept: URBAN - METROPOLITAN AREA HOSPITAL 2 | Facility: HOSPITAL | Age: 77
End: 2022-02-01

## 2022-02-01 VITALS
SYSTOLIC BLOOD PRESSURE: 146 MMHG | DIASTOLIC BLOOD PRESSURE: 79 MMHG | WEIGHT: 258 LBS | OXYGEN SATURATION: 93 % | RESPIRATION RATE: 17 BRPM | DIASTOLIC BLOOD PRESSURE: 90 MMHG | OXYGEN SATURATION: 96 % | SYSTOLIC BLOOD PRESSURE: 160 MMHG | SYSTOLIC BLOOD PRESSURE: 123 MMHG | RESPIRATION RATE: 16 BRPM | HEART RATE: 58 BPM | OXYGEN SATURATION: 100 % | SYSTOLIC BLOOD PRESSURE: 131 MMHG | SYSTOLIC BLOOD PRESSURE: 158 MMHG | SYSTOLIC BLOOD PRESSURE: 122 MMHG | HEART RATE: 60 BPM | TEMPERATURE: 98 F | RESPIRATION RATE: 18 BRPM | HEART RATE: 59 BPM | HEART RATE: 57 BPM | DIASTOLIC BLOOD PRESSURE: 60 MMHG | OXYGEN SATURATION: 95 % | DIASTOLIC BLOOD PRESSURE: 61 MMHG | DIASTOLIC BLOOD PRESSURE: 56 MMHG | DIASTOLIC BLOOD PRESSURE: 62 MMHG | RESPIRATION RATE: 21 BRPM | DIASTOLIC BLOOD PRESSURE: 67 MMHG | OXYGEN SATURATION: 99 % | SYSTOLIC BLOOD PRESSURE: 142 MMHG | DIASTOLIC BLOOD PRESSURE: 72 MMHG | HEART RATE: 62 BPM | SYSTOLIC BLOOD PRESSURE: 140 MMHG | HEIGHT: 70 IN

## 2022-02-01 DIAGNOSIS — K64.1 SECOND DEGREE HEMORRHOIDS: ICD-10-CM

## 2022-02-01 DIAGNOSIS — D12.4 BENIGN NEOPLASM OF DESCENDING COLON: ICD-10-CM

## 2022-02-01 DIAGNOSIS — D12.3 BENIGN NEOPLASM OF TRANSVERSE COLON: ICD-10-CM

## 2022-02-01 DIAGNOSIS — D12.2 BENIGN NEOPLASM OF ASCENDING COLON: ICD-10-CM

## 2022-02-01 DIAGNOSIS — Z86.010 PERSONAL HISTORY OF COLONIC POLYPS: ICD-10-CM

## 2022-02-01 DIAGNOSIS — K62.5 HEMORRHAGE OF ANUS AND RECTUM: ICD-10-CM

## 2022-02-01 PROBLEM — K63.5 POLYP OF COLON: Status: ACTIVE | Noted: 2022-02-01

## 2022-02-01 LAB
GI HISTOLOGY: A. UNSPECIFIED: (no result)
GI HISTOLOGY: B. UNSPECIFIED: (no result)
GI HISTOLOGY: C. UNSPECIFIED: (no result)
GI HISTOLOGY: PDF REPORT: (no result)

## 2022-02-01 PROCEDURE — 45385 COLONOSCOPY W/LESION REMOVAL: CPT | Mod: PT | Performed by: INTERNAL MEDICINE

## 2022-02-01 PROCEDURE — 88305 TISSUE EXAM BY PATHOLOGIST: CPT | Mod: 26 | Performed by: INTERNAL MEDICINE

## 2022-02-15 ENCOUNTER — ANTICOAGULATION VISIT (OUTPATIENT)
Dept: CARDIOLOGY | Facility: CLINIC | Age: 77
End: 2022-02-15

## 2022-02-15 DIAGNOSIS — I48.11 LONGSTANDING PERSISTENT ATRIAL FIBRILLATION: Primary | ICD-10-CM

## 2022-02-15 LAB — INR PPP: 2 (ref 0.9–1.1)

## 2022-02-15 PROCEDURE — 93793 ANTICOAG MGMT PT WARFARIN: CPT | Performed by: INTERNAL MEDICINE

## 2022-02-15 PROCEDURE — 85610 PROTHROMBIN TIME: CPT | Performed by: INTERNAL MEDICINE

## 2022-02-15 PROCEDURE — 36416 COLLJ CAPILLARY BLOOD SPEC: CPT | Performed by: INTERNAL MEDICINE

## 2022-03-18 ENCOUNTER — ANTICOAGULATION VISIT (OUTPATIENT)
Dept: CARDIOLOGY | Facility: CLINIC | Age: 77
End: 2022-03-18

## 2022-03-18 DIAGNOSIS — I48.11 LONGSTANDING PERSISTENT ATRIAL FIBRILLATION: Primary | ICD-10-CM

## 2022-03-18 LAB — INR PPP: 2.2 (ref 0.9–1.1)

## 2022-03-18 PROCEDURE — 93793 ANTICOAG MGMT PT WARFARIN: CPT | Performed by: INTERNAL MEDICINE

## 2022-03-18 PROCEDURE — 36416 COLLJ CAPILLARY BLOOD SPEC: CPT | Performed by: INTERNAL MEDICINE

## 2022-03-18 PROCEDURE — 85610 PROTHROMBIN TIME: CPT | Performed by: INTERNAL MEDICINE

## 2022-04-06 ENCOUNTER — OFFICE VISIT (OUTPATIENT)
Dept: CARDIOLOGY | Facility: CLINIC | Age: 77
End: 2022-04-06

## 2022-04-06 VITALS
HEIGHT: 70 IN | WEIGHT: 258 LBS | DIASTOLIC BLOOD PRESSURE: 44 MMHG | RESPIRATION RATE: 18 BRPM | HEART RATE: 66 BPM | BODY MASS INDEX: 36.94 KG/M2 | SYSTOLIC BLOOD PRESSURE: 142 MMHG | OXYGEN SATURATION: 97 %

## 2022-04-06 DIAGNOSIS — I10 ESSENTIAL HYPERTENSION: ICD-10-CM

## 2022-04-06 DIAGNOSIS — I48.11 LONGSTANDING PERSISTENT ATRIAL FIBRILLATION: ICD-10-CM

## 2022-04-06 DIAGNOSIS — E78.2 MIXED HYPERLIPIDEMIA: ICD-10-CM

## 2022-04-06 DIAGNOSIS — I25.10 CORONARY ARTERY DISEASE INVOLVING NATIVE CORONARY ARTERY OF NATIVE HEART WITHOUT ANGINA PECTORIS: Primary | ICD-10-CM

## 2022-04-06 DIAGNOSIS — I10 PRIMARY HYPERTENSION: ICD-10-CM

## 2022-04-06 DIAGNOSIS — I48.20 ATRIAL FIBRILLATION, CHRONIC: ICD-10-CM

## 2022-04-06 PROCEDURE — 93000 ELECTROCARDIOGRAM COMPLETE: CPT | Performed by: INTERNAL MEDICINE

## 2022-04-06 PROCEDURE — 99214 OFFICE O/P EST MOD 30 MIN: CPT | Performed by: INTERNAL MEDICINE

## 2022-04-06 RX ORDER — POTASSIUM CHLORIDE 1500 MG/1
TABLET, FILM COATED, EXTENDED RELEASE ORAL
COMMUNITY
Start: 2022-03-29

## 2022-04-06 RX ORDER — METHOCARBAMOL 750 MG/1
TABLET, FILM COATED ORAL
COMMUNITY
Start: 2022-03-24

## 2022-04-06 NOTE — PROGRESS NOTES
Cardiology Office Visit      Encounter Date:  04/06/2022    Patient ID:   Ricky Nunez is a 76 y.o. male.    Reason For Followup:  Shortness of breath  Coronary artery disease  Hypertension    Brief Clinical History:  Dear Dr. Mora, Dc Bearden MD    I had the pleasure of seeing Ricky Nunez today. As you are well aware, this is a 76 y.o. male with a past medical history that is significant for history of hypertension and hyperlipidemia history of acute myocardial infarction cardiac catheterization showed evidence of a severe 2 vessel coronary artery disease with significant stenosis involving the diagonal branch of the LAD with subtotal occlusion that was the culprit lesion for the acute myocardial infarction successfully treated with a placement of a drug eluting stent. Repeat Cardiac catheterization at that time showed evidence of residual disease involving the LAD and also a right coronary artery, His IVUS evaluation of the LAD showed a significant stenosis patient underwent a successful coronary artery bypass surgery patient did very well.     Stress test with no evidence of any significant inducible ischemia  echocardiogram with normal LV systolic function and diastolic dysfunction    Impressions:/April 2021  Severe native two-vessel coronary artery disease  Significant obstructive disease involving the mid circumflex mid LAD and diagonal branch of the LAD  Patent LIMA to the LAD  Patent vein graft to the diagonal  Patent vein graft to the marginal  Normal LV systolic function with estimated LV ejection fraction of 60%  Elevated left-sided filling pressures    Interpretation Summary    · Left ventricular wall thickness is consistent with concentric hypertrophy.  · Estimated left ventricular EF = 60% Estimated left ventricular EF was in agreement with the calculated left ventricular EF. Left ventricular systolic function is normal.  · Mild dilation of the aortic root is present.  · Estimated right  ventricular systolic pressure from tricuspid regurgitation is normal (<35 mmHg).  · Left ventricular diastolic function is consistent with (grade I) impaired relaxation.       Interval History:  Patient denies any symptoms of chest pain  dizziness or syncope  Shortness of breath is somewhat better with the diuretics along with the maintaining sinus rhythm and also home supplemental oxygen therapy    Assessment & Plan    Impressions:  Shortness of breath and dyspnea on exertion which is progressively getting worse  Hypertension  Coronary artery disease  Prior coronary artery bypass surgery  Obesity  Obstructive sleep apnea  Paroxysmal atrial fibrillation/initial intent was to cardiovert patient in sinus rhythm to see if it will help with symptoms but patient spontaneously reverted back to sinus rhythm but now back in A. fib with rate control  Patient is currently maintaining sinus rhythm  Tolerating anticoagulation therapy    Recommendations:  Continued aggressive risk factor modification  Need for weight loss discussed with the patient   continue regular exercise   continue current medical therapy and regular exercise  Continue anticoagulation therapy for stroke prophylaxis for atrial fibrillation  Cardiac catheterization with a patent bypasses  Continue current dose of diuretics but follow-up with primary care physician for renal function assessment and close monitoring on diuretic therapy  Advised patient to continue follow-up with pulmonary CT chest findings reviewed and discussed with patient  Discontinue amiodarone  Continue current dose of beta-blockers  Continue Coumadin and close monitoring of PT/INR  Continue close monitoring  Recent labs with primary care physician office  Recent note from the pulmonary evaluation reviewed and discussed with patient  Continue current medical therapy with aspirin 81 mg p.o. once a day warfarin for anticoagulation therapy potassium 20 mg p.o. once a day carvedilol 6.25 mg  "p.o. twice daily Bumex 1 mg p.o. twice a day Lipitor 80 mg p.o. once a day Norvasc 10 mg p.o. once a day  Continued aggressive risk factor modification use oxygen at nighttime and also use CPAP at nighttime  Regular exercise  Recent cardiac work-up echocardiogram findings reviewed and discussed with patient  Follow-up in office in 6 months    Objective:    Vitals:  Vitals:    04/06/22 1341   BP: 142/44   BP Location: Left arm   Patient Position: Sitting   Cuff Size: Large Adult   Pulse: 66   Resp: 18   SpO2: 97%   Weight: 117 kg (258 lb)   Height: 177.8 cm (70\")       Physical Exam:    General: Alert, cooperative, no distress, appears stated age  Head:  Normocephalic, atraumatic, mucous membranes moist  Eyes:  Conjunctiva/corneas clear, EOM's intact     Neck:  Supple,  no adenopathy;      Lungs: Clear to auscultation bilaterally, no wheezes rhonchi rales are noted  Chest wall: No tenderness  Heart::  Regular rate and rhythm, S1 and S2 normal, no murmur, rub or gallop  Abdomen: Soft, non-tender, nondistended bowel sounds active  Extremities: No cyanosis, clubbing, or edema  Pulses: 2+ and symmetric all extremities  Skin:  No rashes or lesions  Neuro/psych: A&O x3. CN II through XII are grossly intact with appropriate affect      Allergies:  No Known Allergies    Medication Review:     Current Outpatient Medications:   •  albuterol (PROVENTIL HFA;VENTOLIN HFA) 108 (90 Base) MCG/ACT inhaler, Inhale 2 puffs Every 4 (Four) Hours As Needed for Wheezing., Disp: , Rfl:   •  ALPRAZolam (XANAX) 0.25 MG tablet, Take 0.25 mg by mouth 2 (Two) Times a Day As Needed for Anxiety., Disp: , Rfl:   •  amLODIPine (NORVASC) 10 MG tablet, Take 10 mg by mouth Daily., Disp: , Rfl:   •  aspirin 81 MG EC tablet, Take 81 mg by mouth Daily., Disp: , Rfl:   •  atorvastatin (LIPITOR) 80 MG tablet, Take 80 mg by mouth Every Night., Disp: , Rfl:   •  bumetanide (BUMEX) 1 MG tablet, Take 1 tablet by mouth Daily. (Patient taking differently: Take 1 " mg by mouth Daily. Two tabs daily), Disp: 30 tablet, Rfl: 2  •  carvedilol (COREG) 6.25 MG tablet, Take 6.25 mg by mouth 2 (Two) Times a Day With Meals., Disp: , Rfl:   •  escitalopram (LEXAPRO) 10 MG tablet, Take 10 mg by mouth Daily., Disp: , Rfl:   •  glipizide (GLUCOTROL XL) 10 MG 24 hr tablet, Take 10 mg by mouth Daily., Disp: , Rfl:   •  metFORMIN (GLUCOPHAGE) 1000 MG tablet, Take 1,000 mg by mouth 2 (Two) Times a Day With Meals., Disp: , Rfl:   •  methocarbamol (ROBAXIN) 750 MG tablet, TAKE 1 TABLET BY MOUTH EVERY 8 HOURS AS NEEDED FOR SPASMS, Disp: , Rfl:   •  omeprazole (priLOSEC) 20 MG capsule, Take 20 mg by mouth Daily., Disp: , Rfl:   •  potassium chloride ER (K-TAB) 20 MEQ tablet controlled-release ER tablet, , Disp: , Rfl:   •  warfarin (COUMADIN) 5 MG tablet, TAKE ONE AND ONE-HALF TABLET BY MOUTH FOUR DAYS A WEEK THEN 1 TABLET BY MOUTH ON SUNDAY, TUESDAY, AND THURSDAY OR AS DIRECTED, Disp: 120 tablet, Rfl: 1  •  warfarin (COUMADIN) 7.5 MG tablet, Take 7.5 mg by mouth 4 (Four) Times a Week. Monday, Wednesday, Friday, and Saturday, Disp: , Rfl:     Family History:  Family History   Problem Relation Age of Onset   • Heart disease Mother    • Malig Hyperthermia Neg Hx        Past Medical History:  Past Medical History:   Diagnosis Date   • Acid reflux    • Anxiety    • Arthritis    • Cancer (HCC)     SKIN   • COPD (chronic obstructive pulmonary disease) (HCC)    • Coronary artery disease    • Depression    • Heart attack (HCC)    • Hyperlipidemia    • Hypertension    • Knee pain     LEFT   • Seasonal allergies    • Sleep apnea     CPAP       Past surgical History:  Past Surgical History:   Procedure Laterality Date   • CARDIAC CATHETERIZATION N/A 4/15/2021    Procedure: Left Heart Cath;  Surgeon: Staci Estrada MD;  Location: [facility] CATH INVASIVE LOCATION;  Service: Cardiovascular;  Laterality: N/A;  with SVG and LIMA injections   • CARDIAC CATHETERIZATION  4/15/2021    Procedure: Saphenous Vein  Graft;  Surgeon: Staci Estrada MD;  Location: [facility] INVASIVE LOCATION;  Service: Cardiovascular;;   • CHOLECYSTECTOMY OPEN     • CORONARY ARTERY BYPASS GRAFT     • KNEE SURGERY Left    • [place] TOTAL KNEE ARTHROPLASTY Left 10/10/2017    Procedure: LT TOTAL KNEE ARTHROPLASTY;  Surgeon: Maksim Pinto MD;  Location: [facility];  Service: Orthopedics       Social History:  Social History     Socioeconomic History   • Marital status:    Tobacco Use   • Smoking status: Former Smoker     Packs/day: 2.00     Years: 50.00     Pack years: 100.00     Types: Cigarettes     Quit date: 2015     Years since quittin.5   • Smokeless tobacco: Never Used   Vaping Use   • Vaping Use: Never used   Substance and Sexual Activity   • Alcohol use: No   • Drug use: No   • Sexual activity: Defer       Review of Systems:  The following systems were reviewed as they relate to the cardiovascular system: Constitutional, Eyes, ENT, Cardiovascular, Respiratory, Gastrointestinal, Integumentary, Neurological, Psychiatric, Hematologic, Endocrine, Musculoskeletal, and Genitourinary. The pertinent cardiovascular findings are reported above with all other cardiovascular points within those systems being negative.    Diagnostic Study Review:     Current Electrocardiogram:    ECG 12 Lead    Date/Time: 2022 2:24 PM  Performed by: Staci Estrada MD  Authorized by: Staci Estrada MD   Comparison: compared with previous ECG   Similar to previous ECG  Rhythm: sinus rhythm  Ectopy: atrial premature contractions  Rate: normal  BPM: 66  Conduction: 1st degree AV block  QRS axis: normal  Other findings: non-specific ST-T wave changes    Clinical impression: abnormal EKG              NOTE: The following portions of the patient's history were reviewed and updated this visit as appropriate: allergies, current medications, past family history, past medical history, past social history, past surgical history  and problem list.

## 2022-04-19 ENCOUNTER — ANTICOAGULATION VISIT (OUTPATIENT)
Dept: CARDIOLOGY | Facility: CLINIC | Age: 77
End: 2022-04-19

## 2022-04-19 DIAGNOSIS — I48.20 ATRIAL FIBRILLATION, CHRONIC: Primary | ICD-10-CM

## 2022-04-19 LAB — INR PPP: 2.6 (ref 0.9–1.1)

## 2022-04-19 PROCEDURE — 36416 COLLJ CAPILLARY BLOOD SPEC: CPT | Performed by: INTERNAL MEDICINE

## 2022-04-19 PROCEDURE — 93793 ANTICOAG MGMT PT WARFARIN: CPT | Performed by: INTERNAL MEDICINE

## 2022-04-19 PROCEDURE — 85610 PROTHROMBIN TIME: CPT | Performed by: INTERNAL MEDICINE

## 2022-05-20 ENCOUNTER — ANTICOAGULATION VISIT (OUTPATIENT)
Dept: CARDIOLOGY | Facility: CLINIC | Age: 77
End: 2022-05-20

## 2022-05-20 DIAGNOSIS — I48.20 ATRIAL FIBRILLATION, CHRONIC: Primary | ICD-10-CM

## 2022-05-20 LAB — INR PPP: 2.4 (ref 0.9–1.1)

## 2022-05-20 PROCEDURE — 85610 PROTHROMBIN TIME: CPT | Performed by: INTERNAL MEDICINE

## 2022-05-20 PROCEDURE — 36416 COLLJ CAPILLARY BLOOD SPEC: CPT | Performed by: INTERNAL MEDICINE

## 2022-05-20 PROCEDURE — 93793 ANTICOAG MGMT PT WARFARIN: CPT | Performed by: INTERNAL MEDICINE

## 2022-06-13 RX ORDER — WARFARIN SODIUM 5 MG/1
TABLET ORAL
Qty: 120 TABLET | Refills: 1 | Status: SHIPPED | OUTPATIENT
Start: 2022-06-13 | End: 2023-01-16

## 2022-06-21 ENCOUNTER — ANTICOAGULATION VISIT (OUTPATIENT)
Dept: CARDIOLOGY | Facility: CLINIC | Age: 77
End: 2022-06-21

## 2022-06-21 DIAGNOSIS — I48.20 ATRIAL FIBRILLATION, CHRONIC: Primary | ICD-10-CM

## 2022-06-21 LAB — INR PPP: 1.9 (ref 0.9–1.1)

## 2022-06-21 PROCEDURE — 85610 PROTHROMBIN TIME: CPT | Performed by: INTERNAL MEDICINE

## 2022-06-21 PROCEDURE — 93793 ANTICOAG MGMT PT WARFARIN: CPT | Performed by: INTERNAL MEDICINE

## 2022-06-21 PROCEDURE — 36416 COLLJ CAPILLARY BLOOD SPEC: CPT | Performed by: INTERNAL MEDICINE

## 2022-07-26 ENCOUNTER — ANTICOAGULATION VISIT (OUTPATIENT)
Dept: CARDIOLOGY | Facility: CLINIC | Age: 77
End: 2022-07-26

## 2022-07-26 DIAGNOSIS — I48.20 ATRIAL FIBRILLATION, CHRONIC: Primary | ICD-10-CM

## 2022-07-26 LAB — INR PPP: 2.4 (ref 0.9–1.1)

## 2022-07-26 PROCEDURE — 85610 PROTHROMBIN TIME: CPT | Performed by: INTERNAL MEDICINE

## 2022-07-26 PROCEDURE — 93793 ANTICOAG MGMT PT WARFARIN: CPT | Performed by: INTERNAL MEDICINE

## 2022-07-26 PROCEDURE — 36416 COLLJ CAPILLARY BLOOD SPEC: CPT | Performed by: INTERNAL MEDICINE

## 2022-08-23 ENCOUNTER — ANTICOAGULATION VISIT (OUTPATIENT)
Dept: CARDIOLOGY | Facility: CLINIC | Age: 77
End: 2022-08-23

## 2022-08-23 DIAGNOSIS — I48.20 ATRIAL FIBRILLATION, CHRONIC: Primary | ICD-10-CM

## 2022-08-23 LAB — INR PPP: 2.1 (ref 0.9–1.1)

## 2022-08-23 PROCEDURE — 36416 COLLJ CAPILLARY BLOOD SPEC: CPT | Performed by: INTERNAL MEDICINE

## 2022-08-23 PROCEDURE — 93793 ANTICOAG MGMT PT WARFARIN: CPT | Performed by: INTERNAL MEDICINE

## 2022-08-23 PROCEDURE — 85610 PROTHROMBIN TIME: CPT | Performed by: INTERNAL MEDICINE

## 2022-09-20 ENCOUNTER — ANTICOAGULATION VISIT (OUTPATIENT)
Dept: CARDIOLOGY | Facility: CLINIC | Age: 77
End: 2022-09-20

## 2022-09-20 DIAGNOSIS — I48.20 ATRIAL FIBRILLATION, CHRONIC: Primary | ICD-10-CM

## 2022-09-20 LAB — INR PPP: 2 (ref 0.9–1.1)

## 2022-09-20 PROCEDURE — 93793 ANTICOAG MGMT PT WARFARIN: CPT | Performed by: INTERNAL MEDICINE

## 2022-09-20 PROCEDURE — 36416 COLLJ CAPILLARY BLOOD SPEC: CPT | Performed by: INTERNAL MEDICINE

## 2022-09-20 PROCEDURE — 85610 PROTHROMBIN TIME: CPT | Performed by: INTERNAL MEDICINE

## 2022-09-27 RX ORDER — HYDROCODONE BITARTRATE AND ACETAMINOPHEN 7.5; 325 MG/1; MG/1
TABLET ORAL SEE ADMIN INSTRUCTIONS
COMMUNITY
Start: 2022-08-26

## 2022-10-05 ENCOUNTER — OFFICE VISIT (OUTPATIENT)
Dept: CARDIOLOGY | Facility: CLINIC | Age: 77
End: 2022-10-05

## 2022-10-05 VITALS
SYSTOLIC BLOOD PRESSURE: 173 MMHG | WEIGHT: 254 LBS | HEART RATE: 76 BPM | OXYGEN SATURATION: 98 % | BODY MASS INDEX: 36.36 KG/M2 | HEIGHT: 70 IN | DIASTOLIC BLOOD PRESSURE: 83 MMHG

## 2022-10-05 DIAGNOSIS — I48.20 ATRIAL FIBRILLATION, CHRONIC: ICD-10-CM

## 2022-10-05 DIAGNOSIS — I10 ESSENTIAL HYPERTENSION: ICD-10-CM

## 2022-10-05 DIAGNOSIS — I48.11 LONGSTANDING PERSISTENT ATRIAL FIBRILLATION: ICD-10-CM

## 2022-10-05 DIAGNOSIS — I10 PRIMARY HYPERTENSION: ICD-10-CM

## 2022-10-05 DIAGNOSIS — E78.2 MIXED HYPERLIPIDEMIA: ICD-10-CM

## 2022-10-05 DIAGNOSIS — I25.10 CORONARY ARTERY DISEASE INVOLVING NATIVE CORONARY ARTERY OF NATIVE HEART WITHOUT ANGINA PECTORIS: Primary | ICD-10-CM

## 2022-10-05 PROCEDURE — 93000 ELECTROCARDIOGRAM COMPLETE: CPT | Performed by: INTERNAL MEDICINE

## 2022-10-05 PROCEDURE — 99214 OFFICE O/P EST MOD 30 MIN: CPT | Performed by: INTERNAL MEDICINE

## 2022-10-05 NOTE — PROGRESS NOTES
Cardiology Office Visit      Encounter Date:  10/05/2022    Patient ID:   Ricky Nunez is a 77 y.o. male.    Reason For Followup:  Shortness of breath  Coronary artery disease  Hypertension    Brief Clinical History:  Dear Dr. Mora, Dc Bearden MD    I had the pleasure of seeing Ricky Nunez today. As you are well aware, this is a 77 y.o. male with a past medical history that is significant for history of hypertension and hyperlipidemia history of acute myocardial infarction cardiac catheterization showed evidence of a severe 2 vessel coronary artery disease with significant stenosis involving the diagonal branch of the LAD with subtotal occlusion that was the culprit lesion for the acute myocardial infarction successfully treated with a placement of a drug eluting stent. Repeat Cardiac catheterization at that time showed evidence of residual disease involving the LAD and also a right coronary artery, His IVUS evaluation of the LAD showed a significant stenosis patient underwent a successful coronary artery bypass surgery patient did very well.     Stress test with no evidence of any significant inducible ischemia  echocardiogram with normal LV systolic function and diastolic dysfunction    Impressions:/April 2021  Severe native two-vessel coronary artery disease  Significant obstructive disease involving the mid circumflex mid LAD and diagonal branch of the LAD  Patent LIMA to the LAD  Patent vein graft to the diagonal  Patent vein graft to the marginal  Normal LV systolic function with estimated LV ejection fraction of 60%  Elevated left-sided filling pressures    Interpretation Summary    · Left ventricular wall thickness is consistent with concentric hypertrophy.  · Estimated left ventricular EF = 60% Estimated left ventricular EF was in agreement with the calculated left ventricular EF. Left ventricular systolic function is normal.  · Mild dilation of the aortic root is present.  · Estimated right  ventricular systolic pressure from tricuspid regurgitation is normal (<35 mmHg).  · Left ventricular diastolic function is consistent with (grade I) impaired relaxation.       Interval History:  Patient denies any symptoms of chest pain  dizziness or syncope  Shortness of breath is somewhat better  Denies any new cardiac symptoms  Compliant with medical therapy  Tolerating anticoagulation therapy  Assessment & Plan    Impressions:  Shortness of breath and dyspnea on exertion which is progressively getting worse  Hypertension  Coronary artery disease  Prior coronary artery bypass surgery  Obesity  Obstructive sleep apnea  Paroxysmal atrial fibrillation/initial intent was to cardiovert patient in sinus rhythm to see if it will help with symptoms but patient spontaneously reverted back to sinus rhythm but now back in A. fib with rate control  Patient is currently maintaining sinus rhythm  Tolerating anticoagulation therapy  COPD and chronic hypoxia    Recommendations:  Continued aggressive risk factor modification  Need for weight loss discussed with the patient   continue regular exercise   continue current medical therapy and regular exercise  Continue anticoagulation therapy for stroke prophylaxis for atrial fibrillation  Cardiac catheterization with a patent bypasses  Continue current dose of diuretics but follow-up with primary care physician for renal function assessment and close monitoring on diuretic therapy  Advised patient to continue follow-up with pulmonary CT chest findings reviewed and discussed with patient  Discontinue amiodarone  Continue current dose of beta-blockers  Continue Coumadin and close monitoring of PT/INR  Continue close monitoring  Recent labs with primary care physician office  Recent note from the pulmonary evaluation reviewed and discussed with patient  Continue current medical therapy with aspirin 81 mg p.o. once a day warfarin for anticoagulation therapy potassium 20 mg p.o. once a  "day carvedilol 6.25 mg p.o. twice daily Bumex 1 mg p.o. twice a day Lipitor 80 mg p.o. once a day Norvasc 10 mg p.o. once a day  Continued aggressive risk factor modification use oxygen at nighttime and also use CPAP at nighttime  Regular exercise  Recent cardiac work-up echocardiogram findings reviewed and discussed with patient  Follow-up in office in 6 months    Objective:    Vitals:  Vitals:    10/05/22 1319   BP: 173/83   Pulse: 76   SpO2: 98%   Weight: 115 kg (254 lb)   Height: 177.8 cm (70\")       Physical Exam:    General: Alert, cooperative, no distress, appears stated age  Head:  Normocephalic, atraumatic, mucous membranes moist  Eyes:  Conjunctiva/corneas clear, EOM's intact     Neck:  Supple,  no adenopathy;      Lungs: Clear to auscultation bilaterally, no wheezes rhonchi rales are noted  Chest wall: No tenderness  Heart::  Regular rate and rhythm, S1 and S2 normal, no murmur, rub or gallop  Abdomen: Soft, non-tender, nondistended bowel sounds active  Extremities: No cyanosis, clubbing, or edema  Pulses: 2+ and symmetric all extremities  Skin:  No rashes or lesions  Neuro/psych: A&O x3. CN II through XII are grossly intact with appropriate affect      Allergies:  No Known Allergies    Medication Review:     Current Outpatient Medications:   •  albuterol (PROVENTIL HFA;VENTOLIN HFA) 108 (90 Base) MCG/ACT inhaler, Inhale 2 puffs Every 4 (Four) Hours As Needed for Wheezing., Disp: , Rfl:   •  ALPRAZolam (XANAX) 0.25 MG tablet, Take 0.25 mg by mouth 2 (Two) Times a Day As Needed for Anxiety., Disp: , Rfl:   •  amLODIPine (NORVASC) 10 MG tablet, Take 10 mg by mouth Daily., Disp: , Rfl:   •  aspirin 81 MG EC tablet, Take 81 mg by mouth Daily., Disp: , Rfl:   •  atorvastatin (LIPITOR) 80 MG tablet, Take 80 mg by mouth Every Night., Disp: , Rfl:   •  bumetanide (BUMEX) 1 MG tablet, Take 1 tablet by mouth Daily. (Patient taking differently: Take 1 mg by mouth Daily. Two tabs daily), Disp: 30 tablet, Rfl: 2  •  " carvedilol (COREG) 6.25 MG tablet, Take 6.25 mg by mouth 2 (Two) Times a Day With Meals., Disp: , Rfl:   •  escitalopram (LEXAPRO) 10 MG tablet, Take 10 mg by mouth Daily., Disp: , Rfl:   •  glipizide (GLUCOTROL XL) 10 MG 24 hr tablet, Take 10 mg by mouth Daily., Disp: , Rfl:   •  HYDROcodone-acetaminophen (NORCO) 7.5-325 MG per tablet, Take  by mouth See Admin Instructions. Take 1 tablet by mouth every 4-6 hours as needed for pain, Disp: , Rfl:   •  metFORMIN (GLUCOPHAGE) 1000 MG tablet, Take 1,000 mg by mouth 2 (Two) Times a Day With Meals., Disp: , Rfl:   •  methocarbamol (ROBAXIN) 750 MG tablet, TAKE 1 TABLET BY MOUTH EVERY 8 HOURS AS NEEDED FOR SPASMS, Disp: , Rfl:   •  omeprazole (priLOSEC) 20 MG capsule, Take 20 mg by mouth Daily., Disp: , Rfl:   •  potassium chloride ER (K-TAB) 20 MEQ tablet controlled-release ER tablet, , Disp: , Rfl:   •  warfarin (COUMADIN) 5 MG tablet, 7.5mg 4 days a week, 5mg all other days or as instructed, Disp: 120 tablet, Rfl: 1  •  warfarin (COUMADIN) 7.5 MG tablet, Take 7.5 mg by mouth 4 (Four) Times a Week. Monday, Wednesday, Friday, and Saturday, Disp: , Rfl:     Family History:  Family History   Problem Relation Age of Onset   • Heart disease Mother    • Malig Hyperthermia Neg Hx        Past Medical History:  Past Medical History:   Diagnosis Date   • Acid reflux    • Anxiety    • Arthritis    • Cancer (HCC)     SKIN   • COPD (chronic obstructive pulmonary disease) (HCC)    • Coronary artery disease    • Depression    • Heart attack (HCC)    • Hyperlipidemia    • Hypertension    • Knee pain     LEFT   • Seasonal allergies    • Sleep apnea     CPAP       Past surgical History:  Past Surgical History:   Procedure Laterality Date   • CARDIAC CATHETERIZATION N/A 4/15/2021    Procedure: Left Heart Cath;  Surgeon: Staci Estrada MD;  Location: [facility] CATH INVASIVE LOCATION;  Service: Cardiovascular;  Laterality: N/A;  with SVG and LIMA injections   • CARDIAC CATHETERIZATION   4/15/2021    Procedure: Saphenous Vein Graft;  Surgeon: Staci Estrada MD;  Location: [facility] INVASIVE LOCATION;  Service: Cardiovascular;;   • CHOLECYSTECTOMY OPEN     • CORONARY ARTERY BYPASS GRAFT     • KNEE SURGERY Left    • [place] TOTAL KNEE ARTHROPLASTY Left 10/10/2017    Procedure: LT TOTAL KNEE ARTHROPLASTY;  Surgeon: Maksim Pinto MD;  Location: [facility] OR;  Service: Orthopedics       Social History:  Social History     Socioeconomic History   • Marital status:    Tobacco Use   • Smoking status: Former Smoker     Packs/day: 2.00     Years: 50.00     Pack years: 100.00     Types: Cigarettes     Quit date: 2015     Years since quittin.0   • Smokeless tobacco: Never Used   Vaping Use   • Vaping Use: Never used   Substance and Sexual Activity   • Alcohol use: No   • Drug use: No   • Sexual activity: Defer       Review of Systems:  The following systems were reviewed as they relate to the cardiovascular system: Constitutional, Eyes, ENT, Cardiovascular, Respiratory, Gastrointestinal, Integumentary, Neurological, Psychiatric, Hematologic, Endocrine, Musculoskeletal, and Genitourinary. The pertinent cardiovascular findings are reported above with all other cardiovascular points within those systems being negative.    Diagnostic Study Review:     Current Electrocardiogram:    ECG 12 Lead    Date/Time: 10/5/2022 1:47 PM  Performed by: Staci Estrada MD  Authorized by: Staci Estrada MD   Comparison: compared with previous ECG   Similar to previous ECG  Rhythm: sinus rhythm  Rate: normal  BPM: 76  Conduction: conduction normal  QRS axis: normal  Other findings: non-specific ST-T wave changes    Clinical impression: abnormal EKG              NOTE: The following portions of the patient's history were reviewed and updated this visit as appropriate: allergies, current medications, past family history, past medical history, past social history, past surgical history  and problem list.

## 2022-10-18 ENCOUNTER — ANTICOAGULATION VISIT (OUTPATIENT)
Dept: CARDIOLOGY | Facility: CLINIC | Age: 77
End: 2022-10-18

## 2022-10-18 DIAGNOSIS — I48.11 LONGSTANDING PERSISTENT ATRIAL FIBRILLATION: Primary | ICD-10-CM

## 2022-10-18 LAB — INR PPP: 2.5 (ref 0.9–1.1)

## 2022-10-18 PROCEDURE — 85610 PROTHROMBIN TIME: CPT | Performed by: INTERNAL MEDICINE

## 2022-10-18 PROCEDURE — 93793 ANTICOAG MGMT PT WARFARIN: CPT | Performed by: INTERNAL MEDICINE

## 2022-10-18 PROCEDURE — 36416 COLLJ CAPILLARY BLOOD SPEC: CPT | Performed by: INTERNAL MEDICINE

## 2022-11-15 ENCOUNTER — ANTICOAGULATION VISIT (OUTPATIENT)
Dept: CARDIOLOGY | Facility: CLINIC | Age: 77
End: 2022-11-15

## 2022-11-15 DIAGNOSIS — I48.11 LONGSTANDING PERSISTENT ATRIAL FIBRILLATION: Primary | ICD-10-CM

## 2022-11-15 LAB — INR PPP: 2 (ref 0.9–1.1)

## 2022-11-15 PROCEDURE — 85610 PROTHROMBIN TIME: CPT | Performed by: INTERNAL MEDICINE

## 2022-11-15 PROCEDURE — 36416 COLLJ CAPILLARY BLOOD SPEC: CPT | Performed by: INTERNAL MEDICINE

## 2022-11-15 PROCEDURE — 93793 ANTICOAG MGMT PT WARFARIN: CPT | Performed by: INTERNAL MEDICINE

## 2022-12-13 ENCOUNTER — ANTICOAGULATION VISIT (OUTPATIENT)
Dept: CARDIOLOGY | Facility: CLINIC | Age: 77
End: 2022-12-13

## 2022-12-13 DIAGNOSIS — I48.11 LONGSTANDING PERSISTENT ATRIAL FIBRILLATION: Primary | ICD-10-CM

## 2022-12-13 LAB — INR PPP: 2.2 (ref 0.9–1.1)

## 2022-12-13 PROCEDURE — 36416 COLLJ CAPILLARY BLOOD SPEC: CPT | Performed by: INTERNAL MEDICINE

## 2022-12-13 PROCEDURE — 93793 ANTICOAG MGMT PT WARFARIN: CPT | Performed by: INTERNAL MEDICINE

## 2022-12-13 PROCEDURE — 85610 PROTHROMBIN TIME: CPT | Performed by: INTERNAL MEDICINE

## 2022-12-14 ENCOUNTER — TELEPHONE (OUTPATIENT)
Dept: CARDIOLOGY | Facility: CLINIC | Age: 77
End: 2022-12-14

## 2022-12-14 NOTE — TELEPHONE ENCOUNTER
Caller: ALEJANDRA    Relationship: SELF    Best call back number: [phone]    What is the best time to reach you: ANY    Who are you requesting to speak with (clinical staff, provider,  specific staff member): TERESSA        What was the call regarding: GO TO THE DOCTOR JAN 3RD AND WILL STAY ON THE SAME MEDICINE UNTIL THEN    Do you require a callback: ONLY IF NEEDED

## 2022-12-19 ENCOUNTER — TELEPHONE (OUTPATIENT)
Dept: CARDIOLOGY | Facility: CLINIC | Age: 77
End: 2022-12-19

## 2022-12-19 NOTE — TELEPHONE ENCOUNTER
Caller: Alejandra Schofield    Relationship: Self    Best call back number: [phone]    What is the best time to reach you: ANYTIME    Who are you requesting to speak with (clinical staff, provider,  specific staff member): CLINICAL STAFF    Do you know the name of the person who called: ALEJANDRA SCHOFIELD    What was the call regarding: PT HAD AN ANTICOAGULATION APPT ON 12.13.22. HE WANTS TO KNOW IF HE NEEDS TO KEEP THE APPT ON 12.20.22.    Do you require a callback: YES

## 2022-12-20 ENCOUNTER — ANTICOAGULATION VISIT (OUTPATIENT)
Dept: CARDIOLOGY | Facility: CLINIC | Age: 77
End: 2022-12-20

## 2022-12-20 DIAGNOSIS — I48.11 LONGSTANDING PERSISTENT ATRIAL FIBRILLATION: Primary | ICD-10-CM

## 2022-12-20 LAB — INR PPP: 2.1 (ref 0.9–1.1)

## 2022-12-20 PROCEDURE — 36416 COLLJ CAPILLARY BLOOD SPEC: CPT | Performed by: INTERNAL MEDICINE

## 2022-12-20 PROCEDURE — 93793 ANTICOAG MGMT PT WARFARIN: CPT | Performed by: INTERNAL MEDICINE

## 2022-12-20 PROCEDURE — 85610 PROTHROMBIN TIME: CPT | Performed by: INTERNAL MEDICINE

## 2023-01-16 RX ORDER — WARFARIN SODIUM 5 MG/1
TABLET ORAL
Qty: 120 TABLET | Refills: 1 | Status: SHIPPED | OUTPATIENT
Start: 2023-01-16

## 2023-01-17 ENCOUNTER — ANTICOAGULATION VISIT (OUTPATIENT)
Dept: CARDIOLOGY | Facility: CLINIC | Age: 78
End: 2023-01-17
Payer: MEDICARE

## 2023-01-17 DIAGNOSIS — I48.11 LONGSTANDING PERSISTENT ATRIAL FIBRILLATION: Primary | ICD-10-CM

## 2023-01-17 LAB — INR PPP: 1.6 (ref 0.9–1.1)

## 2023-01-17 PROCEDURE — 85610 PROTHROMBIN TIME: CPT | Performed by: INTERNAL MEDICINE

## 2023-01-17 PROCEDURE — 93793 ANTICOAG MGMT PT WARFARIN: CPT | Performed by: INTERNAL MEDICINE

## 2023-01-17 PROCEDURE — 36416 COLLJ CAPILLARY BLOOD SPEC: CPT | Performed by: INTERNAL MEDICINE

## 2023-01-31 ENCOUNTER — ANTICOAGULATION VISIT (OUTPATIENT)
Dept: CARDIOLOGY | Facility: CLINIC | Age: 78
End: 2023-01-31
Payer: MEDICARE

## 2023-01-31 DIAGNOSIS — I48.11 LONGSTANDING PERSISTENT ATRIAL FIBRILLATION: Primary | ICD-10-CM

## 2023-01-31 LAB — INR PPP: 2.6 (ref 0.9–1.1)

## 2023-01-31 PROCEDURE — 93793 ANTICOAG MGMT PT WARFARIN: CPT | Performed by: INTERNAL MEDICINE

## 2023-01-31 PROCEDURE — 85610 PROTHROMBIN TIME: CPT | Performed by: INTERNAL MEDICINE

## 2023-01-31 PROCEDURE — 36416 COLLJ CAPILLARY BLOOD SPEC: CPT | Performed by: INTERNAL MEDICINE

## 2023-02-28 ENCOUNTER — ANTICOAGULATION VISIT (OUTPATIENT)
Dept: CARDIOLOGY | Facility: CLINIC | Age: 78
End: 2023-02-28
Payer: MEDICARE

## 2023-02-28 DIAGNOSIS — I48.11 LONGSTANDING PERSISTENT ATRIAL FIBRILLATION: Primary | ICD-10-CM

## 2023-02-28 LAB — INR PPP: 2.8 (ref 0.9–1.1)

## 2023-02-28 PROCEDURE — 85610 PROTHROMBIN TIME: CPT | Performed by: INTERNAL MEDICINE

## 2023-02-28 PROCEDURE — 93793 ANTICOAG MGMT PT WARFARIN: CPT | Performed by: INTERNAL MEDICINE

## 2023-02-28 PROCEDURE — 36416 COLLJ CAPILLARY BLOOD SPEC: CPT | Performed by: INTERNAL MEDICINE

## 2023-03-28 ENCOUNTER — ANTICOAGULATION VISIT (OUTPATIENT)
Dept: CARDIOLOGY | Facility: CLINIC | Age: 78
End: 2023-03-28
Payer: MEDICARE

## 2023-03-28 DIAGNOSIS — I48.11 LONGSTANDING PERSISTENT ATRIAL FIBRILLATION: Primary | ICD-10-CM

## 2023-03-28 LAB — INR PPP: 2.3 (ref 0.9–1.1)

## 2023-03-28 PROCEDURE — 36416 COLLJ CAPILLARY BLOOD SPEC: CPT | Performed by: INTERNAL MEDICINE

## 2023-03-28 PROCEDURE — 93793 ANTICOAG MGMT PT WARFARIN: CPT | Performed by: INTERNAL MEDICINE

## 2023-03-28 PROCEDURE — 85610 PROTHROMBIN TIME: CPT | Performed by: INTERNAL MEDICINE

## 2023-04-07 RX ORDER — FERROUS SULFATE 325(65) MG
1 TABLET ORAL EVERY 12 HOURS SCHEDULED
COMMUNITY
Start: 2023-01-17

## 2023-04-07 RX ORDER — ONDANSETRON 4 MG/1
TABLET, ORALLY DISINTEGRATING ORAL
COMMUNITY
Start: 2023-01-15

## 2023-04-07 RX ORDER — GABAPENTIN 100 MG/1
1 CAPSULE ORAL 3 TIMES DAILY
COMMUNITY
Start: 2023-01-03

## 2023-04-21 ENCOUNTER — ANTICOAGULATION VISIT (OUTPATIENT)
Dept: CARDIOLOGY | Facility: CLINIC | Age: 78
End: 2023-04-21
Payer: MEDICARE

## 2023-04-21 ENCOUNTER — OFFICE VISIT (OUTPATIENT)
Dept: CARDIOLOGY | Facility: CLINIC | Age: 78
End: 2023-04-21
Payer: MEDICARE

## 2023-04-21 VITALS
HEIGHT: 70 IN | OXYGEN SATURATION: 93 % | SYSTOLIC BLOOD PRESSURE: 142 MMHG | BODY MASS INDEX: 36.08 KG/M2 | DIASTOLIC BLOOD PRESSURE: 62 MMHG | HEART RATE: 72 BPM | WEIGHT: 252 LBS

## 2023-04-21 DIAGNOSIS — I48.11 LONGSTANDING PERSISTENT ATRIAL FIBRILLATION: Primary | ICD-10-CM

## 2023-04-21 DIAGNOSIS — I10 ESSENTIAL HYPERTENSION: ICD-10-CM

## 2023-04-21 DIAGNOSIS — E78.2 MIXED HYPERLIPIDEMIA: ICD-10-CM

## 2023-04-21 DIAGNOSIS — I25.10 CORONARY ARTERY DISEASE INVOLVING NATIVE CORONARY ARTERY OF NATIVE HEART WITHOUT ANGINA PECTORIS: ICD-10-CM

## 2023-04-21 DIAGNOSIS — I48.20 ATRIAL FIBRILLATION, CHRONIC: ICD-10-CM

## 2023-04-21 DIAGNOSIS — I10 PRIMARY HYPERTENSION: ICD-10-CM

## 2023-04-21 LAB — INR PPP: 2.7 (ref 0.9–1.1)

## 2023-04-21 PROCEDURE — 3077F SYST BP >= 140 MM HG: CPT | Performed by: INTERNAL MEDICINE

## 2023-04-21 PROCEDURE — 3078F DIAST BP <80 MM HG: CPT | Performed by: INTERNAL MEDICINE

## 2023-04-21 PROCEDURE — 99214 OFFICE O/P EST MOD 30 MIN: CPT | Performed by: INTERNAL MEDICINE

## 2023-04-21 PROCEDURE — 1159F MED LIST DOCD IN RCRD: CPT | Performed by: INTERNAL MEDICINE

## 2023-04-21 PROCEDURE — 1160F RVW MEDS BY RX/DR IN RCRD: CPT | Performed by: INTERNAL MEDICINE

## 2023-04-21 PROCEDURE — 85610 PROTHROMBIN TIME: CPT | Performed by: INTERNAL MEDICINE

## 2023-04-21 PROCEDURE — 93793 ANTICOAG MGMT PT WARFARIN: CPT | Performed by: INTERNAL MEDICINE

## 2023-04-21 PROCEDURE — 36416 COLLJ CAPILLARY BLOOD SPEC: CPT | Performed by: INTERNAL MEDICINE

## 2023-04-21 NOTE — PROGRESS NOTES
Cardiology Office Visit      Encounter Date:  04/21/2023    Patient ID:   Ricky Nunez is a 78 y.o. male.  Reason For Followup:  Shortness of breath  Coronary artery disease  Hypertension    Brief Clinical History:  Dear Dr. Mora, Dc Bearden MD    I had the pleasure of seeing Ricky Nunez today. As you are well aware, this is a 78 y.o. male with a past medical history that is significant for history of hypertension and hyperlipidemia history of acute myocardial infarction cardiac catheterization showed evidence of a severe 2 vessel coronary artery disease with significant stenosis involving the diagonal branch of the LAD with subtotal occlusion that was the culprit lesion for the acute myocardial infarction successfully treated with a placement of a drug eluting stent. Repeat Cardiac catheterization at that time showed evidence of residual disease involving the LAD and also a right coronary artery, His IVUS evaluation of the LAD showed a significant stenosis patient underwent a successful coronary artery bypass surgery patient did very well.     Stress test with no evidence of any significant inducible ischemia  echocardiogram with normal LV systolic function and diastolic dysfunction    Impressions:/April 2021  Severe native two-vessel coronary artery disease  Significant obstructive disease involving the mid circumflex mid LAD and diagonal branch of the LAD  Patent LIMA to the LAD  Patent vein graft to the diagonal  Patent vein graft to the marginal  Normal LV systolic function with estimated LV ejection fraction of 60%  Elevated left-sided filling pressures    Interpretation Summary    · Left ventricular wall thickness is consistent with concentric hypertrophy.  · Estimated left ventricular EF = 60% Estimated left ventricular EF was in agreement with the calculated left ventricular EF. Left ventricular systolic function is normal.  · Mild dilation of the aortic root is present.  · Estimated right  ventricular systolic pressure from tricuspid regurgitation is normal (<35 mmHg).  · Left ventricular diastolic function is consistent with (grade I) impaired relaxation.       Interval History:  Patient denies any symptoms of chest pain  dizziness or syncope  Shortness of breath  Dyspnea on exertion  Denies any new cardiac symptoms  Compliant with medical therapy  Tolerating anticoagulation therapy  Assessment & Plan    Impressions:  Shortness of breath and dyspnea on exertion which is progressively getting worse  Hypertension  Coronary artery disease  Prior coronary artery bypass surgery  Obesity  Obstructive sleep apnea  Paroxysmal atrial fibrillation/initial intent was to cardiovert patient in sinus rhythm to see if it will help with symptoms but patient spontaneously reverted back to sinus rhythm but now back in A. fib with rate control  Patient is currently maintaining sinus rhythm  Tolerating anticoagulation therapy  COPD and chronic hypoxia  Paroxysmal atrial fibrillation patient is currently in atrial fibrillation I am not sure if that is what contributing to his symptoms  Recommendations:  Continued aggressive risk factor modification  Need for weight loss discussed with the patient   continue regular exercise   continue current medical therapy and regular exercise  Continue anticoagulation therapy for stroke prophylaxis for atrial fibrillation  Cardiac catheterization with a patent bypasses  Continue current dose of diuretics but follow-up with primary care physician for renal function assessment and close monitoring on diuretic therapy  Advised patient to continue follow-up with pulmonary CT chest findings reviewed and discussed with patient  Discontinue amiodarone  Continue current dose of beta-blockers  Continue Coumadin and close monitoring of PT/INR  Continue close monitoring  Recent labs with primary care physician office  Recent note from the pulmonary evaluation reviewed and discussed with  "patient  Continue current medical therapy with aspirin 81 mg p.o. once a day warfarin for anticoagulation therapy potassium 20 mg p.o. once a day carvedilol 6.25 mg p.o. twice daily Bumex 1 mg p.o. twice a day Lipitor 80 mg p.o. once a day Norvasc 10 mg p.o. once a day  Continued aggressive risk factor modification use oxygen at nighttime and also use CPAP at nighttime  Regular exercise  Recent cardiac work-up echocardiogram findings reviewed and discussed with patient  Close monitoring of blood pressure at home  Home blood pressure readings are optimal  Extended Holter monitor to see the A-fib burden to see if patient A-fib is contributing to patient's symptoms of shortness of breath if you need to maintain sinus rhythm  Risk benefits and alternatives for anticoagulation therapy reviewed and discussed patient  Continue follow-up with the pulmonary and primary care  Follow-up in office in 6 months    Objective:    Vitals:  Vitals:    04/21/23 0906   BP: 142/62   Pulse: 72   SpO2: 93%   Weight: 114 kg (252 lb)   Height: 177.8 cm (70\")       Physical Exam:    General: Alert, cooperative, no distress, appears stated age  Head:  Normocephalic, atraumatic, mucous membranes moist  Eyes:  Conjunctiva/corneas clear, EOM's intact     Neck:  Supple,  no adenopathy;      Lungs: Clear to auscultation bilaterally, no wheezes rhonchi rales are noted  Chest wall: No tenderness  Heart::  Regular rate and rhythm, S1 and S2 normal, no murmur, rub or gallop  Abdomen: Soft, non-tender, nondistended bowel sounds active  Extremities: No cyanosis, clubbing, or edema  Pulses: 2+ and symmetric all extremities  Skin:  No rashes or lesions  Neuro/psych: A&O x3. CN II through XII are grossly intact with appropriate affect      Allergies:  No Known Allergies    Medication Review:     Current Outpatient Medications:   •  albuterol (PROVENTIL HFA;VENTOLIN HFA) 108 (90 Base) MCG/ACT inhaler, Inhale 2 puffs Every 4 (Four) Hours As Needed for " Wheezing., Disp: , Rfl:   •  ALPRAZolam (XANAX) 0.25 MG tablet, Take 1 tablet by mouth 2 (Two) Times a Day As Needed for Anxiety., Disp: , Rfl:   •  amLODIPine (NORVASC) 10 MG tablet, Take 1 tablet by mouth Daily., Disp: , Rfl:   •  aspirin 81 MG EC tablet, Take 1 tablet by mouth Daily., Disp: , Rfl:   •  atorvastatin (LIPITOR) 80 MG tablet, Take 1 tablet by mouth Every Night., Disp: , Rfl:   •  bumetanide (BUMEX) 1 MG tablet, Take 1 tablet by mouth Daily. (Patient taking differently: Take 1 tablet by mouth Daily. Two tabs daily), Disp: 30 tablet, Rfl: 2  •  carvedilol (COREG) 6.25 MG tablet, Take 1 tablet by mouth 2 (Two) Times a Day With Meals., Disp: , Rfl:   •  escitalopram (LEXAPRO) 10 MG tablet, Take 1 tablet by mouth Daily., Disp: , Rfl:   •  FeroSul 325 (65 Fe) MG tablet, Take 1 tablet by mouth Every 12 (Twelve) Hours., Disp: , Rfl:   •  gabapentin (NEURONTIN) 100 MG capsule, Take 1 capsule by mouth 3 (Three) Times a Day., Disp: , Rfl:   •  glipizide (GLUCOTROL XL) 10 MG 24 hr tablet, Take 1 tablet by mouth Daily., Disp: , Rfl:   •  HYDROcodone-acetaminophen (NORCO) 7.5-325 MG per tablet, Take  by mouth See Admin Instructions. Take 1 tablet by mouth every 4-6 hours as needed for pain, Disp: , Rfl:   •  metFORMIN (GLUCOPHAGE) 1000 MG tablet, Take 1 tablet by mouth 2 (Two) Times a Day With Meals., Disp: , Rfl:   •  methocarbamol (ROBAXIN) 750 MG tablet, TAKE 1 TABLET BY MOUTH EVERY 8 HOURS AS NEEDED FOR SPASMS, Disp: , Rfl:   •  omeprazole (priLOSEC) 20 MG capsule, Take 1 capsule by mouth Daily., Disp: , Rfl:   •  ondansetron ODT (ZOFRAN-ODT) 4 MG disintegrating tablet, , Disp: , Rfl:   •  potassium chloride ER (K-TAB) 20 MEQ tablet controlled-release ER tablet, , Disp: , Rfl:   •  warfarin (COUMADIN) 5 MG tablet, TAKE 1 1/2 TABLET BY MOUTH 4 DAYS A WEEK THEN 1 TABLET ALL OTHER DAYS OR AS INSTRUCTED, Disp: 120 tablet, Rfl: 1  •  warfarin (COUMADIN) 7.5 MG tablet, Take 1 tablet by mouth 4 (Four) Times a Week.  Monday, Wednesday, Friday, and Saturday, Disp: , Rfl:     Family History:  Family History   Problem Relation Age of Onset   • Heart disease Mother    • Malig Hyperthermia Neg Hx        Past Medical History:  Past Medical History:   Diagnosis Date   • Acid reflux    • Anxiety    • Arthritis    • Cancer     SKIN   • COPD (chronic obstructive pulmonary disease)    • Coronary artery disease    • Depression    • Heart attack    • Hyperlipidemia    • Hypertension    • Knee pain     LEFT   • Seasonal allergies    • Sleep apnea     CPAP       Past surgical History:  Past Surgical History:   Procedure Laterality Date   • CARDIAC CATHETERIZATION N/A 4/15/2021    Procedure: Left Heart Cath;  Surgeon: Staci Estrada MD;  Location: [facility] CATH INVASIVE LOCATION;  Service: Cardiovascular;  Laterality: N/A;  with SVG and LIMA injections   • CARDIAC CATHETERIZATION  4/15/2021    Procedure: Saphenous Vein Graft;  Surgeon: Staci Estrada MD;  Location: [facility] CATH INVASIVE LOCATION;  Service: Cardiovascular;;   • CHOLECYSTECTOMY OPEN     • CORONARY ARTERY BYPASS GRAFT     • KNEE SURGERY Left    • OH ARTHRP KNE CONDYLE&PLATU MEDIAL&LAT COMPARTMENTS Left 10/10/2017    Procedure: LT TOTAL KNEE ARTHROPLASTY;  Surgeon: Maksim Pinto MD;  Location: [facility] MAIN OR;  Service: Orthopedics       Social History:  Social History     Socioeconomic History   • Marital status:    Tobacco Use   • Smoking status: Former     Packs/day: 2.00     Years: 50.00     Pack years: 100.00     Types: Cigarettes     Quit date: 2015     Years since quittin.5   • Smokeless tobacco: Never   Vaping Use   • Vaping Use: Never used   Substance and Sexual Activity   • Alcohol use: No   • Drug use: No   • Sexual activity: Defer       Review of Systems:  The following systems were reviewed as they relate to the cardiovascular system: Constitutional, Eyes, ENT, Cardiovascular, Respiratory, Gastrointestinal, Integumentary,  Neurological, Psychiatric, Hematologic, Endocrine, Musculoskeletal, and Genitourinary. The pertinent cardiovascular findings are reported above with all other cardiovascular points within those systems being negative.    Diagnostic Study Review:     Current Electrocardiogram:    ECG 12 Lead    Date/Time: 4/21/2023 9:49 AM  Performed by: Staci Estrada MD  Authorized by: Staic Estrada MD   Comparison: compared with previous ECG   Similar to previous ECG  Rhythm: atrial fibrillation  Rate: normal  BPM: 80  Conduction: conduction normal  QRS axis: normal  Other findings: non-specific ST-T wave changes    Clinical impression: abnormal EKG              NOTE: The following portions of the patient's history were reviewed and updated this visit as appropriate: allergies, current medications, past family history, past medical history, past social history, past surgical history and problem list.

## 2023-05-04 ENCOUNTER — HOSPITAL ENCOUNTER (OUTPATIENT)
Dept: CARDIOLOGY | Facility: HOSPITAL | Age: 78
Discharge: HOME | End: 2023-05-04
Payer: MEDICARE

## 2023-05-04 DIAGNOSIS — I10 ESSENTIAL HYPERTENSION: ICD-10-CM

## 2023-05-04 DIAGNOSIS — I48.11 LONGSTANDING PERSISTENT ATRIAL FIBRILLATION: ICD-10-CM

## 2023-05-04 DIAGNOSIS — I25.10 CORONARY ARTERY DISEASE INVOLVING NATIVE CORONARY ARTERY OF NATIVE HEART WITHOUT ANGINA PECTORIS: ICD-10-CM

## 2023-05-04 DIAGNOSIS — I48.20 ATRIAL FIBRILLATION, CHRONIC: ICD-10-CM

## 2023-05-04 LAB
MAXIMAL PREDICTED HEART RATE: 144 BPM
STRESS TARGET HR: 122 BPM

## 2023-05-23 ENCOUNTER — ANTICOAGULATION VISIT (OUTPATIENT)
Dept: CARDIOLOGY | Facility: CLINIC | Age: 78
End: 2023-05-23
Payer: MEDICARE

## 2023-05-23 DIAGNOSIS — I48.11 LONGSTANDING PERSISTENT ATRIAL FIBRILLATION: Primary | ICD-10-CM

## 2023-05-23 LAB — INR PPP: 1.8 (ref 0.9–1.1)

## 2023-05-23 PROCEDURE — 93793 ANTICOAG MGMT PT WARFARIN: CPT | Performed by: INTERNAL MEDICINE

## 2023-05-23 PROCEDURE — 36416 COLLJ CAPILLARY BLOOD SPEC: CPT | Performed by: INTERNAL MEDICINE

## 2023-05-23 PROCEDURE — 85610 PROTHROMBIN TIME: CPT | Performed by: INTERNAL MEDICINE

## 2023-06-05 LAB
MAXIMAL PREDICTED HEART RATE: 144 BPM
STRESS TARGET HR: 122 BPM

## 2023-06-06 ENCOUNTER — TELEPHONE (OUTPATIENT)
Dept: CARDIOLOGY | Facility: CLINIC | Age: 78
End: 2023-06-06
Payer: MEDICARE

## 2023-06-06 NOTE — TELEPHONE ENCOUNTER
OK FOR HUB TO RELEASE    Staci Estrada MD Melissa, MA    Holter monitor looks okay  A-fib is noted 10% of the time during the whole month  Continue the same therapy and follow-up as scheduled  No change in medications for now

## 2023-07-25 ENCOUNTER — ANTICOAGULATION VISIT (OUTPATIENT)
Dept: CARDIOLOGY | Facility: CLINIC | Age: 78
End: 2023-07-25
Payer: MEDICARE

## 2023-07-25 DIAGNOSIS — I48.11 LONGSTANDING PERSISTENT ATRIAL FIBRILLATION: Primary | ICD-10-CM

## 2023-07-25 LAB — INR PPP: 2 (ref 0.9–1.1)

## 2023-07-25 PROCEDURE — 93793 ANTICOAG MGMT PT WARFARIN: CPT | Performed by: INTERNAL MEDICINE

## 2023-07-25 PROCEDURE — 85610 PROTHROMBIN TIME: CPT | Performed by: INTERNAL MEDICINE

## 2023-07-25 PROCEDURE — 36416 COLLJ CAPILLARY BLOOD SPEC: CPT | Performed by: INTERNAL MEDICINE

## 2023-08-08 ENCOUNTER — ANTICOAGULATION VISIT (OUTPATIENT)
Dept: CARDIOLOGY | Facility: CLINIC | Age: 78
End: 2023-08-08
Payer: MEDICARE

## 2023-08-08 DIAGNOSIS — I48.11 LONGSTANDING PERSISTENT ATRIAL FIBRILLATION: Primary | ICD-10-CM

## 2023-08-08 LAB — INR PPP: 2.1 (ref 0.9–1.1)

## 2023-08-08 PROCEDURE — 36416 COLLJ CAPILLARY BLOOD SPEC: CPT | Performed by: INTERNAL MEDICINE

## 2023-08-08 PROCEDURE — 93793 ANTICOAG MGMT PT WARFARIN: CPT | Performed by: INTERNAL MEDICINE

## 2023-08-08 PROCEDURE — 85610 PROTHROMBIN TIME: CPT | Performed by: INTERNAL MEDICINE

## 2023-09-05 ENCOUNTER — ANTICOAGULATION VISIT (OUTPATIENT)
Dept: CARDIOLOGY | Facility: CLINIC | Age: 78
End: 2023-09-05
Payer: MEDICARE

## 2023-09-05 DIAGNOSIS — I48.11 LONGSTANDING PERSISTENT ATRIAL FIBRILLATION: Primary | ICD-10-CM

## 2023-09-05 LAB — INR PPP: 2.1 (ref 0.9–1.1)

## 2023-09-05 PROCEDURE — 93793 ANTICOAG MGMT PT WARFARIN: CPT | Performed by: INTERNAL MEDICINE

## 2023-09-05 PROCEDURE — 85610 PROTHROMBIN TIME: CPT | Performed by: INTERNAL MEDICINE

## 2023-09-05 PROCEDURE — 36416 COLLJ CAPILLARY BLOOD SPEC: CPT | Performed by: INTERNAL MEDICINE

## 2023-10-03 ENCOUNTER — ANTICOAGULATION VISIT (OUTPATIENT)
Dept: CARDIOLOGY | Facility: CLINIC | Age: 78
End: 2023-10-03
Payer: MEDICARE

## 2023-10-03 DIAGNOSIS — I48.11 LONGSTANDING PERSISTENT ATRIAL FIBRILLATION: Primary | ICD-10-CM

## 2023-10-03 LAB — INR PPP: 1.7 (ref 0.9–1.1)

## 2023-10-03 PROCEDURE — 36416 COLLJ CAPILLARY BLOOD SPEC: CPT | Performed by: INTERNAL MEDICINE

## 2023-10-03 PROCEDURE — 85610 PROTHROMBIN TIME: CPT | Performed by: INTERNAL MEDICINE

## 2023-10-03 PROCEDURE — 93793 ANTICOAG MGMT PT WARFARIN: CPT | Performed by: INTERNAL MEDICINE

## 2023-11-03 ENCOUNTER — OFFICE VISIT (OUTPATIENT)
Dept: CARDIOLOGY | Facility: CLINIC | Age: 78
End: 2023-11-03
Payer: MEDICARE

## 2023-11-03 ENCOUNTER — ANTICOAGULATION VISIT (OUTPATIENT)
Dept: CARDIOLOGY | Facility: CLINIC | Age: 78
End: 2023-11-03
Payer: MEDICARE

## 2023-11-03 VITALS
HEART RATE: 63 BPM | SYSTOLIC BLOOD PRESSURE: 152 MMHG | BODY MASS INDEX: 35.07 KG/M2 | HEIGHT: 70 IN | OXYGEN SATURATION: 96 % | WEIGHT: 245 LBS | DIASTOLIC BLOOD PRESSURE: 72 MMHG

## 2023-11-03 DIAGNOSIS — I25.10 CORONARY ARTERY DISEASE INVOLVING NATIVE CORONARY ARTERY OF NATIVE HEART WITHOUT ANGINA PECTORIS: ICD-10-CM

## 2023-11-03 DIAGNOSIS — I25.10 CORONARY ARTERY DISEASE DUE TO LIPID RICH PLAQUE: ICD-10-CM

## 2023-11-03 DIAGNOSIS — I48.11 LONGSTANDING PERSISTENT ATRIAL FIBRILLATION: Primary | ICD-10-CM

## 2023-11-03 DIAGNOSIS — I48.20 ATRIAL FIBRILLATION, CHRONIC: ICD-10-CM

## 2023-11-03 DIAGNOSIS — I48.11 LONGSTANDING PERSISTENT ATRIAL FIBRILLATION: ICD-10-CM

## 2023-11-03 DIAGNOSIS — I10 PRIMARY HYPERTENSION: ICD-10-CM

## 2023-11-03 DIAGNOSIS — I10 ESSENTIAL (PRIMARY) HYPERTENSION: ICD-10-CM

## 2023-11-03 DIAGNOSIS — R06.02 SOB (SHORTNESS OF BREATH): Primary | ICD-10-CM

## 2023-11-03 DIAGNOSIS — E78.2 MIXED HYPERLIPIDEMIA: ICD-10-CM

## 2023-11-03 DIAGNOSIS — I25.83 CORONARY ARTERY DISEASE DUE TO LIPID RICH PLAQUE: ICD-10-CM

## 2023-11-03 LAB — INR PPP: 2.1 (ref 0.9–1.1)

## 2023-11-03 PROCEDURE — 93000 ELECTROCARDIOGRAM COMPLETE: CPT | Performed by: INTERNAL MEDICINE

## 2023-11-03 PROCEDURE — 36416 COLLJ CAPILLARY BLOOD SPEC: CPT | Performed by: INTERNAL MEDICINE

## 2023-11-03 PROCEDURE — 1159F MED LIST DOCD IN RCRD: CPT | Performed by: INTERNAL MEDICINE

## 2023-11-03 PROCEDURE — 3077F SYST BP >= 140 MM HG: CPT | Performed by: INTERNAL MEDICINE

## 2023-11-03 PROCEDURE — 85610 PROTHROMBIN TIME: CPT | Performed by: INTERNAL MEDICINE

## 2023-11-03 PROCEDURE — 3078F DIAST BP <80 MM HG: CPT | Performed by: INTERNAL MEDICINE

## 2023-11-03 PROCEDURE — 1160F RVW MEDS BY RX/DR IN RCRD: CPT | Performed by: INTERNAL MEDICINE

## 2023-11-03 PROCEDURE — 99214 OFFICE O/P EST MOD 30 MIN: CPT | Performed by: INTERNAL MEDICINE

## 2023-11-03 RX ORDER — DAPAGLIFLOZIN 10 MG/1
1 TABLET, FILM COATED ORAL DAILY
COMMUNITY
Start: 2023-10-10

## 2023-11-03 RX ORDER — PREDNISONE 10 MG/1
TABLET ORAL
COMMUNITY
Start: 2023-10-20

## 2023-11-03 NOTE — PROGRESS NOTES
Cardiology Office Visit      Encounter Date:  11/03/2023    Patient ID:   Ricky Nunez is a 78 y.o. male.    Reason For Followup:  Shortness of breath  Coronary artery disease  Hypertension    Brief Clinical History:  Dear Dr. Mora, Dc Bearden MD    I had the pleasure of seeing Ricky Nunez today. As you are well aware, this is a 78 y.o. male with a past medical history that is significant for history of hypertension and hyperlipidemia history of acute myocardial infarction cardiac catheterization showed evidence of a severe 2 vessel coronary artery disease with significant stenosis involving the diagonal branch of the LAD with subtotal occlusion that was the culprit lesion for the acute myocardial infarction successfully treated with a placement of a drug eluting stent. Repeat Cardiac catheterization at that time showed evidence of residual disease involving the LAD and also a right coronary artery, His IVUS evaluation of the LAD showed a significant stenosis patient underwent a successful coronary artery bypass surgery patient did very well.     Stress test with no evidence of any significant inducible ischemia  echocardiogram with normal LV systolic function and diastolic dysfunction    Impressions:/April 2021  Severe native two-vessel coronary artery disease  Significant obstructive disease involving the mid circumflex mid LAD and diagonal branch of the LAD  Patent LIMA to the LAD  Patent vein graft to the diagonal  Patent vein graft to the marginal  Normal LV systolic function with estimated LV ejection fraction of 60%  Elevated left-sided filling pressures    Interpretation Summary    Left ventricular wall thickness is consistent with concentric hypertrophy.  Estimated left ventricular EF = 60% Estimated left ventricular EF was in agreement with the calculated left ventricular EF. Left ventricular systolic function is normal.  Mild dilation of the aortic root is present.  Estimated right  ventricular systolic pressure from tricuspid regurgitation is normal (<35 mmHg).  Left ventricular diastolic function is consistent with (grade I) impaired relaxation.       Interval History:  Patient denies any symptoms of chest pain  dizziness or syncope  Shortness of breath  Dyspnea on exertion  Denies any new cardiac symptoms  Compliant with medical therapy  Tolerating anticoagulation therapy  Assessment & Plan    Impressions:  Shortness of breath and dyspnea on exertion which is progressively getting worse  Hypertension  Coronary artery disease  Prior coronary artery bypass surgery  Obesity  Obstructive sleep apnea  Paroxysmal atrial fibrillation/initial intent was to cardiovert patient in sinus rhythm to see if it will help with symptoms but patient spontaneously reverted back to sinus rhythm but now back in A. fib with rate control  Patient is currently maintaining sinus rhythm  Tolerating anticoagulation therapy  COPD and chronic hypoxia  Paroxysmal atrial fibrillation patient is currently in atrial fibrillation I am not sure if that is what contributing to his symptoms  Recommendations:  Continued aggressive risk factor modification  Need for weight loss discussed with the patient   continue regular exercise   continue current medical therapy and regular exercise  Continue anticoagulation therapy for stroke prophylaxis for atrial fibrillation  Cardiac catheterization with a patent bypasses  Continue current dose of diuretics but follow-up with primary care physician for renal function assessment and close monitoring on diuretic therapy  Advised patient to continue follow-up with pulmonary CT chest findings reviewed and discussed with patient  Discontinue amiodarone  Continue current dose of beta-blockers  Continue Coumadin and close monitoring of PT/INR  Continue close monitoring  Recent labs with primary care physician office  Recent note from the pulmonary evaluation reviewed and discussed with  "patient  Continue current medical therapy with aspirin 81 mg p.o. once a day warfarin for anticoagulation therapy potassium 20 mg p.o. once a day carvedilol 6.25 mg p.o. twice daily Bumex 1 mg p.o. twice a day Lipitor 80 mg p.o. once a day Norvasc 10 mg p.o. once a day  Continued aggressive risk factor modification use oxygen at nighttime and also use CPAP at nighttime  Regular exercise  Recent cardiac work-up echocardiogram findings reviewed and discussed with patient  Close monitoring of blood pressure at home  Home blood pressure readings are optimal  Check a BMP and proBNP  Risk benefits and alternatives for anticoagulation therapy reviewed and discussed patient  Continue follow-up with the pulmonary and primary care  Continue regular exercise and weight loss  History of semaglutide after discussing with primary care physician for possible weight loss  Follow-up in office in 6 months        Vitals:  Vitals:    11/03/23 1325   BP: 152/72   Pulse: 63   SpO2: 96%   Weight: 111 kg (245 lb)   Height: 177.8 cm (70\")       Physical Exam:    General: Alert, cooperative, no distress, appears stated age  Head:  Normocephalic, atraumatic, mucous membranes moist  Eyes:  Conjunctiva/corneas clear, EOM's intact     Neck:  Supple,  no adenopathy;      Lungs: Clear to auscultation bilaterally, no wheezes rhonchi rales are noted  Chest wall: No tenderness  Heart::  Regular rate and rhythm, S1 and S2 normal, no murmur, rub or gallop  Abdomen: Soft, non-tender, nondistended bowel sounds active  Extremities: No cyanosis, clubbing, or edema  Pulses: 2+ and symmetric all extremities  Skin:  No rashes or lesions  Neuro/psych: A&O x3. CN II through XII are grossly intact with appropriate affect              Lab Results   Component Value Date    GLUCOSE 123 (H) 04/13/2021    BUN 14 04/13/2021    CREATININE 0.99 04/13/2021    BCR 14.1 04/13/2021    K 4.2 04/13/2021    CO2 22.8 04/13/2021    CALCIUM 9.3 04/13/2021    ALBUMIN 4.00 " 09/26/2017    BILITOT 0.4 09/26/2017    AST 21 09/26/2017    ALT 30 09/26/2017     Results for orders placed during the hospital encounter of 01/18/21    Adult Transthoracic Echo Complete W/ Cont if Necessary Per Protocol    Interpretation Summary  · Left ventricular wall thickness is consistent with concentric hypertrophy.  · Estimated left ventricular EF = 60% Estimated left ventricular EF was in agreement with the calculated left ventricular EF. Left ventricular systolic function is normal.  · Mild dilation of the aortic root is present.  · Estimated right ventricular systolic pressure from tricuspid regurgitation is normal (<35 mmHg).  · Left ventricular diastolic function is consistent with (grade I) impaired relaxation.     Results for orders placed during the hospital encounter of 04/15/21    Cardiac Catheterization/Vascular Study    Narrative  Table formatting from the original result was not included.  Cardiac Catheterization Operative Report    Ricky Nunez  [id number]  4/15/2021  @PCP@    He underwent cardiac catheterization.    Indications for the procedure include: shortness of breath.    Procedure Details:  The risks, benefits, complications, treatment options, and expected outcomes were discussed with the patient. The patient and/or family concurred with the proposed plan, giving informed consent.    After informed consent the patient was brought to the cath lab after appropriate IV hydration was begun and oral premedication was given. He was further sedated with fentanyl. He was prepped and draped in the usual manner. Using the modified Seldinger access technique, a 6 [place] sheath was placed in the femoral artery. A left heart catheterization with coronary arteriography was performed. Findings are discussed below.  JR4 catheter and also a 5 [place] multipurpose catheter was used to do the selective injection of the vein graft to the diagonal and also selective injection of the vein graft to the  marginal and selective injection of the LIMA to the LAD.  Patient tolerated procedure well with no immediate post fluid complications.    After the procedure was completed, sedation was stopped and the sheaths and catheters were all removed. Hemostasis was achieved per established hospital protocols.    Conscious sedation:  Conscious sedation was performed according to protocol.  I supervised and directed an independent trained observer with the assistance of monitoring the patient's level of consciousness and physiologic status throughout the procedure.  Intraoperative service time was 45 minutes.    Findings:    Hemodynamics Central aortic pressure systolic 145 and diastolic 59 with a mean pressure of 106 mmHg  LV end-diastolic pressure was 26 mmHg  There was no gradient across the aortic valve on the pullback of the pigtail catheter  Left Main  left main is angiographically normal bifurcates into left anterior descending and left circumflex arteries.  Short left main with no significant obstructive coronary artery disease involving the left main coronary artery  RCA  nondominant vessel provides only marginal branches has a mid focal area of 50% stenosis  LAD  large-caliber vessel has a mid angiographic 70% stenosis  Diagonal branches 100% occluded in the ostial portion  Circ  mid focal area of 70 to 80% stenosis before the marginal branches  There is also a moderate to large size ramus intermediate branch with a mid angiographic 30 to 40% stenosis  SVG(s)  vein graft to marginal is patent with diffuse angiographic 50 to 60% stenosis involving the ostium and proximal 30 to 40 mm rest of the body of the vein graft without any significant obstructive disease involving the body and also the anastomotic site.  This marginal graft retrogradely fills other marginals all the way into the left main  Vein graft to the diagonal is patent without any significant stenosis involving the ostium body or the anastomotic site  ASHLEY  " patent LIMA to the LAD with no evidence of any stenosis involving the ostium / body or the anastomotic site there is significant competitive flow in the native LAD so there is not much filling of the LAD from the LIMA even the LIMA is open  LV  normal LV systolic function normal wall motion normal left-sided filling pressures.  Estimated LV ejection fraction of 60%.  Coronary Dominance  left circumflex artery    Estimated Blood Loss:  Minimal    Specimens: None    Complications:  None; patient tolerated the procedure well.    Disposition: PACU - hemodynamically stable.    Condition: stable    Impressions:  Severe native two-vessel coronary artery disease  Significant obstructive disease involving the mid circumflex mid LAD and diagonal branch of the LAD  Patent LIMA to the LAD  Patent vein graft to the diagonal  Patent vein graft to the marginal  Normal LV systolic function with estimated LV ejection fraction of 60%  Elevated left-sided filling pressures    Recommendations:  Medical management for after coronary artery disease  Okay to resume anticoagulation therapy with Coumadin  Continued aggressive risk factor modification  Test results reviewed and discussed with patient and family     No results found for: \"CHOL\", \"CHLPL\", \"TRIG\", \"HDL\", \"LDL\", \"LDLDIRECT\"   Results for orders placed during the hospital encounter of 01/18/21    Stress Test With Myocardial Perfusion One Day    Interpretation Summary  · Myocardial perfusion imaging indicates a normal myocardial perfusion study with no evidence of ischemia.  · Left ventricular ejection fraction is normal. (Calculated EF = 58%).  · Impressions are consistent with a low risk study.   Results for orders placed during the hospital encounter of 05/04/23    Mobile Cardiac Outpatient Telemetry    Interpretation Summary  Extended Holter monitor study  Patient was monitored from May 4, 2023 to June 2, 2023  Underlying rhythm is sinus rhythm with minimal heart rate of 50 " bpm maximal heart rate of 132 beats minute average heart rate of 65 beats per  No clinically significant pauses  First-degree AV block  A-fib burden of 12%  During A-fib episodes patient a minimal heart rate was 50 bpm maximal rate of 120 beats minute average heart rate of 70 bpm  PVC burden of 1%  PAC burden of 4%  Clinical correlation is           Objective:          Allergies:  No Known Allergies    Medication Review:     Current Outpatient Medications:     albuterol (PROVENTIL HFA;VENTOLIN HFA) 108 (90 Base) MCG/ACT inhaler, Inhale 2 puffs Every 4 (Four) Hours As Needed for Wheezing., Disp: , Rfl:     ALPRAZolam (XANAX) 0.25 MG tablet, Take 1 tablet by mouth 2 (Two) Times a Day As Needed for Anxiety., Disp: , Rfl:     amLODIPine (NORVASC) 10 MG tablet, Take 1 tablet by mouth Daily., Disp: , Rfl:     aspirin 81 MG EC tablet, Take 1 tablet by mouth Daily., Disp: , Rfl:     atorvastatin (LIPITOR) 80 MG tablet, Take 1 tablet by mouth Every Night., Disp: , Rfl:     bumetanide (BUMEX) 1 MG tablet, Take 1 tablet by mouth Daily. (Patient taking differently: Take 1 tablet by mouth Daily. Two tabs daily), Disp: 30 tablet, Rfl: 2    carvedilol (COREG) 6.25 MG tablet, Take 1 tablet by mouth 2 (Two) Times a Day With Meals., Disp: , Rfl:     escitalopram (LEXAPRO) 10 MG tablet, Take 1 tablet by mouth Daily., Disp: , Rfl:     Farxiga 10 MG tablet, Take 10 mg by mouth Daily., Disp: , Rfl:     FeroSul 325 (65 Fe) MG tablet, Take 1 tablet by mouth Every 12 (Twelve) Hours., Disp: , Rfl:     gabapentin (NEURONTIN) 100 MG capsule, Take 1 capsule by mouth 3 (Three) Times a Day., Disp: , Rfl:     glipizide (GLUCOTROL XL) 10 MG 24 hr tablet, Take 1 tablet by mouth Daily., Disp: , Rfl:     HYDROcodone-acetaminophen (NORCO) 7.5-325 MG per tablet, Take  by mouth See Admin Instructions. Take 1 tablet by mouth every 4-6 hours as needed for pain, Disp: , Rfl:     metFORMIN (GLUCOPHAGE) 1000 MG tablet, Take 1 tablet by mouth 2 (Two) Times a  Day With Meals., Disp: , Rfl:     methocarbamol (ROBAXIN) 750 MG tablet, TAKE 1 TABLET BY MOUTH EVERY 8 HOURS AS NEEDED FOR SPASMS, Disp: , Rfl:     omeprazole (priLOSEC) 20 MG capsule, Take 1 capsule by mouth Daily., Disp: , Rfl:     ondansetron ODT (ZOFRAN-ODT) 4 MG disintegrating tablet, , Disp: , Rfl:     potassium chloride ER (K-TAB) 20 MEQ tablet controlled-release ER tablet, , Disp: , Rfl:     predniSONE (DELTASONE) 10 MG tablet, , Disp: , Rfl:     warfarin (COUMADIN) 5 MG tablet, TAKE 1.5 TABLETS BY MOUTH 3 DAYS PER WEEK (TU/TH/SAT) AND 1 TABLET ALL OTHER DAYS OR AS DIRECTED, Disp: 120 tablet, Rfl: 1    Family History:  Family History   Problem Relation Age of Onset    Heart disease Mother     Malig Hyperthermia Neg Hx        Past Medical History:  Past Medical History:   Diagnosis Date    Acid reflux     Anxiety     Arthritis     Cancer     SKIN    COPD (chronic obstructive pulmonary disease)     Coronary artery disease     Depression     Heart attack     Hyperlipidemia     Hypertension     Knee pain     LEFT    Seasonal allergies     Sleep apnea     CPAP       Past surgical History:  Past Surgical History:   Procedure Laterality Date    CARDIAC CATHETERIZATION N/A 4/15/2021    Procedure: Left Heart Cath;  Surgeon: Staci Estrada MD;  Location: [facility] CATH INVASIVE LOCATION;  Service: Cardiovascular;  Laterality: N/A;  with SVG and LIMA injections    CARDIAC CATHETERIZATION  4/15/2021    Procedure: Saphenous Vein Graft;  Surgeon: Staci Estrada MD;  Location: [facility] CATH INVASIVE LOCATION;  Service: Cardiovascular;;    CHOLECYSTECTOMY OPEN      CORONARY ARTERY BYPASS GRAFT  2015    KNEE SURGERY Left     IA ARTHRP KNE CONDYLE&PLATU MEDIAL&LAT COMPARTMENTS Left 10/10/2017    Procedure: LT TOTAL KNEE ARTHROPLASTY;  Surgeon: Maksim Pinto MD;  Location: [facility] OR;  Service: Orthopedics       Social History:  Social History     Socioeconomic History    Marital status:    Tobacco  Use    Smoking status: Former     Packs/day: 2.00     Years: 50.00     Additional pack years: 0.00     Total pack years: 100.00     Types: Cigarettes     Quit date: 2015     Years since quittin.1    Smokeless tobacco: Never   Vaping Use    Vaping Use: Never used   Substance and Sexual Activity    Alcohol use: No    Drug use: No    Sexual activity: Defer       Review of Systems:  The following systems were reviewed as they relate to the cardiovascular system: Constitutional, Eyes, ENT, Cardiovascular, Respiratory, Gastrointestinal, Integumentary, Neurological, Psychiatric, Hematologic, Endocrine, Musculoskeletal, and Genitourinary. The pertinent cardiovascular findings are reported above with all other cardiovascular points within those systems being negative.    Diagnostic Study Review:     Current Electrocardiogram:    ECG 12 Lead    Date/Time: 11/3/2023 1:53 PM  Performed by: Staci Estrada MD    Authorized by: Staci Estrada MD  Comparison: compared with previous ECG   Similar to previous ECG  Rhythm: sinus rhythm  Rate: normal  BPM: 67  Conduction: conduction normal  QRS axis: normal  Other findings: non-specific ST-T wave changes    Clinical impression: abnormal EKG                NOTE: The following portions of the patient's history were reviewed and updated this visit as appropriate: allergies, current medications, past family history, past medical history, past social history, past surgical history and problem list.

## 2023-12-05 ENCOUNTER — ANTICOAGULATION VISIT (OUTPATIENT)
Dept: CARDIOLOGY | Facility: CLINIC | Age: 78
End: 2023-12-05
Payer: MEDICARE

## 2023-12-05 DIAGNOSIS — I48.11 LONGSTANDING PERSISTENT ATRIAL FIBRILLATION: Primary | ICD-10-CM

## 2023-12-05 LAB — INR PPP: 3.4 (ref 0.9–1.1)

## 2023-12-05 PROCEDURE — 85610 PROTHROMBIN TIME: CPT | Performed by: INTERNAL MEDICINE

## 2023-12-05 PROCEDURE — 36416 COLLJ CAPILLARY BLOOD SPEC: CPT | Performed by: INTERNAL MEDICINE

## 2023-12-05 PROCEDURE — 93793 ANTICOAG MGMT PT WARFARIN: CPT | Performed by: INTERNAL MEDICINE

## 2023-12-26 ENCOUNTER — ANTICOAGULATION VISIT (OUTPATIENT)
Dept: CARDIOLOGY | Facility: CLINIC | Age: 78
End: 2023-12-26
Payer: MEDICARE

## 2023-12-26 DIAGNOSIS — I48.11 LONGSTANDING PERSISTENT ATRIAL FIBRILLATION: Primary | ICD-10-CM

## 2023-12-26 LAB — INR PPP: 2.6 (ref 0.9–1.1)

## 2023-12-26 PROCEDURE — 85610 PROTHROMBIN TIME: CPT | Performed by: INTERNAL MEDICINE

## 2023-12-26 PROCEDURE — 93793 ANTICOAG MGMT PT WARFARIN: CPT | Performed by: INTERNAL MEDICINE

## 2023-12-26 PROCEDURE — 36416 COLLJ CAPILLARY BLOOD SPEC: CPT | Performed by: INTERNAL MEDICINE

## 2024-01-17 RX ORDER — WARFARIN SODIUM 5 MG/1
TABLET ORAL
Qty: 120 TABLET | Refills: 1 | Status: SHIPPED | OUTPATIENT
Start: 2024-01-17

## 2024-01-23 ENCOUNTER — ANTICOAGULATION VISIT (OUTPATIENT)
Dept: CARDIOLOGY | Facility: CLINIC | Age: 79
End: 2024-01-23
Payer: MEDICARE

## 2024-01-23 DIAGNOSIS — I48.11 LONGSTANDING PERSISTENT ATRIAL FIBRILLATION: Primary | ICD-10-CM

## 2024-01-23 LAB — INR PPP: 2.4 (ref 0.9–1.1)

## 2024-01-23 PROCEDURE — 36416 COLLJ CAPILLARY BLOOD SPEC: CPT | Performed by: INTERNAL MEDICINE

## 2024-01-23 PROCEDURE — 93793 ANTICOAG MGMT PT WARFARIN: CPT | Performed by: INTERNAL MEDICINE

## 2024-01-23 PROCEDURE — 85610 PROTHROMBIN TIME: CPT | Performed by: INTERNAL MEDICINE

## 2024-02-23 ENCOUNTER — ANTICOAGULATION VISIT (OUTPATIENT)
Dept: CARDIOLOGY | Facility: CLINIC | Age: 79
End: 2024-02-23
Payer: MEDICARE

## 2024-02-23 DIAGNOSIS — I48.11 LONGSTANDING PERSISTENT ATRIAL FIBRILLATION: Primary | ICD-10-CM

## 2024-02-23 LAB — INR PPP: 3.1 (ref 0.9–1.1)

## 2024-02-23 PROCEDURE — 36416 COLLJ CAPILLARY BLOOD SPEC: CPT | Performed by: INTERNAL MEDICINE

## 2024-02-23 PROCEDURE — 93793 ANTICOAG MGMT PT WARFARIN: CPT | Performed by: INTERNAL MEDICINE

## 2024-02-23 PROCEDURE — 85610 PROTHROMBIN TIME: CPT | Performed by: INTERNAL MEDICINE

## 2024-03-19 ENCOUNTER — ANTICOAGULATION VISIT (OUTPATIENT)
Dept: CARDIOLOGY | Facility: CLINIC | Age: 79
End: 2024-03-19
Payer: MEDICARE

## 2024-03-19 DIAGNOSIS — I48.11 LONGSTANDING PERSISTENT ATRIAL FIBRILLATION: Primary | ICD-10-CM

## 2024-03-19 LAB — INR PPP: 3 (ref 0.9–1.1)

## 2024-03-19 PROCEDURE — 36416 COLLJ CAPILLARY BLOOD SPEC: CPT | Performed by: INTERNAL MEDICINE

## 2024-03-19 PROCEDURE — 93793 ANTICOAG MGMT PT WARFARIN: CPT | Performed by: INTERNAL MEDICINE

## 2024-03-19 PROCEDURE — 85610 PROTHROMBIN TIME: CPT | Performed by: INTERNAL MEDICINE

## 2024-04-16 ENCOUNTER — ANTICOAGULATION VISIT (OUTPATIENT)
Dept: CARDIOLOGY | Facility: CLINIC | Age: 79
End: 2024-04-16
Payer: MEDICARE

## 2024-04-16 DIAGNOSIS — I48.11 LONGSTANDING PERSISTENT ATRIAL FIBRILLATION: Primary | ICD-10-CM

## 2024-04-16 LAB — INR PPP: 2.6 (ref 0.9–1.1)

## 2024-04-16 PROCEDURE — 93793 ANTICOAG MGMT PT WARFARIN: CPT | Performed by: INTERNAL MEDICINE

## 2024-04-16 PROCEDURE — 36416 COLLJ CAPILLARY BLOOD SPEC: CPT | Performed by: INTERNAL MEDICINE

## 2024-04-16 PROCEDURE — 85610 PROTHROMBIN TIME: CPT | Performed by: INTERNAL MEDICINE

## 2024-05-03 ENCOUNTER — ANTICOAGULATION VISIT (OUTPATIENT)
Dept: CARDIOLOGY | Facility: CLINIC | Age: 79
End: 2024-05-03
Payer: MEDICARE

## 2024-05-03 ENCOUNTER — OFFICE VISIT (OUTPATIENT)
Dept: CARDIOLOGY | Facility: CLINIC | Age: 79
End: 2024-05-03
Payer: MEDICARE

## 2024-05-03 VITALS
BODY MASS INDEX: 34.79 KG/M2 | WEIGHT: 243 LBS | HEART RATE: 61 BPM | OXYGEN SATURATION: 93 % | DIASTOLIC BLOOD PRESSURE: 65 MMHG | SYSTOLIC BLOOD PRESSURE: 138 MMHG | HEIGHT: 70 IN

## 2024-05-03 DIAGNOSIS — E78.2 MIXED HYPERLIPIDEMIA: ICD-10-CM

## 2024-05-03 DIAGNOSIS — I48.11 LONGSTANDING PERSISTENT ATRIAL FIBRILLATION: Primary | ICD-10-CM

## 2024-05-03 DIAGNOSIS — I10 PRIMARY HYPERTENSION: ICD-10-CM

## 2024-05-03 DIAGNOSIS — I25.10 CORONARY ARTERY DISEASE INVOLVING NATIVE CORONARY ARTERY OF NATIVE HEART WITHOUT ANGINA PECTORIS: ICD-10-CM

## 2024-05-03 DIAGNOSIS — I10 ESSENTIAL HYPERTENSION: ICD-10-CM

## 2024-05-03 LAB — INR PPP: 2.9 (ref 0.9–1.1)

## 2024-05-03 PROCEDURE — 3075F SYST BP GE 130 - 139MM HG: CPT | Performed by: INTERNAL MEDICINE

## 2024-05-03 PROCEDURE — 36416 COLLJ CAPILLARY BLOOD SPEC: CPT | Performed by: INTERNAL MEDICINE

## 2024-05-03 PROCEDURE — 1159F MED LIST DOCD IN RCRD: CPT | Performed by: INTERNAL MEDICINE

## 2024-05-03 PROCEDURE — 99214 OFFICE O/P EST MOD 30 MIN: CPT | Performed by: INTERNAL MEDICINE

## 2024-05-03 PROCEDURE — 85610 PROTHROMBIN TIME: CPT | Performed by: INTERNAL MEDICINE

## 2024-05-03 PROCEDURE — 3078F DIAST BP <80 MM HG: CPT | Performed by: INTERNAL MEDICINE

## 2024-05-03 PROCEDURE — 1160F RVW MEDS BY RX/DR IN RCRD: CPT | Performed by: INTERNAL MEDICINE

## 2024-05-03 PROCEDURE — 93000 ELECTROCARDIOGRAM COMPLETE: CPT | Performed by: INTERNAL MEDICINE

## 2024-05-03 NOTE — PROGRESS NOTES
Cardiology Office Visit      Encounter Date:  05/03/2024    Patient ID:   Ricky Nunez is a 79 y.o. male.      Reason For Followup:  Shortness of breath  Coronary artery disease  Hypertension    Brief Clinical History:  Dear Dr. Mora, Dc Bearden MD    I had the pleasure of seeing Ricky Nunez today. As you are well aware, this is a 79 y.o. male with a past medical history that is significant for history of hypertension and hyperlipidemia history of acute myocardial infarction cardiac catheterization showed evidence of a severe 2 vessel coronary artery disease with significant stenosis involving the diagonal branch of the LAD with subtotal occlusion that was the culprit lesion for the acute myocardial infarction successfully treated with a placement of a drug eluting stent. Repeat Cardiac catheterization at that time showed evidence of residual disease involving the LAD and also a right coronary artery, His IVUS evaluation of the LAD showed a significant stenosis patient underwent a successful coronary artery bypass surgery patient did very well.   Stress test with no evidence of any significant inducible ischemia  echocardiogram with normal LV systolic function and diastolic dysfunction    Impressions:/April 2021  Severe native two-vessel coronary artery disease  Significant obstructive disease involving the mid circumflex mid LAD and diagonal branch of the LAD  Patent LIMA to the LAD  Patent vein graft to the diagonal  Patent vein graft to the marginal  Normal LV systolic function with estimated LV ejection fraction of 60%  Elevated left-sided filling pressures    Interpretation Summary    Left ventricular wall thickness is consistent with concentric hypertrophy.  Estimated left ventricular EF = 60% Estimated left ventricular EF was in agreement with the calculated left ventricular EF. Left ventricular systolic function is normal.  Mild dilation of the aortic root is present.  Estimated right  ventricular systolic pressure from tricuspid regurgitation is normal (<35 mmHg).  Left ventricular diastolic function is consistent with (grade I) impaired relaxation.       Interval History:  Patient denies any symptoms of chest pain  dizziness or syncope  Denies any new cardiac symptoms  Compliant with medical therapy  Tolerating anticoagulation therapy  Assessment & Plan    Impressions:  Shortness of breath and dyspnea on exertion which is progressively getting worse  Hypertension  Coronary artery disease  Prior coronary artery bypass surgery  Obesity  Obstructive sleep apnea  Paroxysmal atrial fibrillation/initial intent was to cardiovert patient in sinus rhythm to see if it will help with symptoms but patient spontaneously reverted back to sinus rhythm but now back in A. fib with rate control  Patient is currently maintaining sinus rhythm  Tolerating anticoagulation therapy  COPD and chronic hypoxia  Paroxysmal atrial fibrillation patient is currently in atrial fibrillation I am not sure if that is what contributing to his symptoms  Recommendations:  Continued aggressive risk factor modification  Need for weight loss discussed with the patient   continue regular exercise   continue current medical therapy and regular exercise  Continue anticoagulation therapy for stroke prophylaxis for atrial fibrillation  Cardiac catheterization with a patent bypasses  Continue current dose of diuretics but follow-up with primary care physician for renal function assessment and close monitoring on diuretic therapy  Advised patient to continue follow-up with pulmonary CT chest findings reviewed and discussed with patient  Discontinue amiodarone  Continue current dose of beta-blockers  Continue Coumadin and close monitoring of PT/INR  Continue close monitoring  Recent labs with primary care physician office  Recent note from the pulmonary evaluation reviewed and discussed with patient  Continue current medical therapy with  "aspirin 81 mg p.o. once a day warfarin for anticoagulation therapy potassium 20 mg p.o. once a day carvedilol 6.25 mg p.o. twice daily Bumex 1 mg p.o. twice a day Lipitor 80 mg p.o. once a day Norvasc 10 mg p.o. once a day  Continued aggressive risk factor modification use oxygen at nighttime and also use CPAP at nighttime  Regular exercise  Recent cardiac work-up echocardiogram findings reviewed and discussed with patient  Close monitoring of blood pressure at home  Home blood pressure readings are optimal  Check a BMP and proBNP  Risk benefits and alternatives for anticoagulation therapy reviewed and discussed patient  Continue follow-up with the pulmonary and primary care  Continue regular exercise and weight loss  Follow-up in office in 6 months        Vitals:  Vitals:    05/03/24 1300   BP: 138/65   Pulse: 61   SpO2: 93%   Weight: 110 kg (243 lb)   Height: 177.8 cm (70\")       Physical Exam:    General: Alert, cooperative, no distress, appears stated age  Head:  Normocephalic, atraumatic, mucous membranes moist  Eyes:  Conjunctiva/corneas clear, EOM's intact     Neck:  Supple,  no adenopathy;      Lungs: Clear to auscultation bilaterally, no wheezes rhonchi rales are noted  Chest wall: No tenderness  Heart::  Regular rate and rhythm, S1 and S2 normal, no murmur, rub or gallop  Abdomen: Soft, non-tender, nondistended bowel sounds active  Extremities: No cyanosis, clubbing, or edema  Pulses: 2+ and symmetric all extremities  Skin:  No rashes or lesions  Neuro/psych: A&O x3. CN II through XII are grossly intact with appropriate affect              Lab Results   Component Value Date    GLUCOSE 123 (H) 04/13/2021    BUN 14 04/13/2021    CREATININE 0.99 04/13/2021    BCR 14.1 04/13/2021    K 4.2 04/13/2021    CO2 22.8 04/13/2021    CALCIUM 9.3 04/13/2021    ALBUMIN 4.00 09/26/2017    BILITOT 0.4 09/26/2017    AST 21 09/26/2017    ALT 30 09/26/2017     Results for orders placed during the hospital encounter of " 01/18/21    Adult Transthoracic Echo Complete W/ Cont if Necessary Per Protocol    Interpretation Summary  · Left ventricular wall thickness is consistent with concentric hypertrophy.  · Estimated left ventricular EF = 60% Estimated left ventricular EF was in agreement with the calculated left ventricular EF. Left ventricular systolic function is normal.  · Mild dilation of the aortic root is present.  · Estimated right ventricular systolic pressure from tricuspid regurgitation is normal (<35 mmHg).  · Left ventricular diastolic function is consistent with (grade I) impaired relaxation.     Results for orders placed during the hospital encounter of 04/15/21    Cardiac Catheterization/Vascular Study    Narrative  Table formatting from the original result was not included.  Cardiac Catheterization Operative Report    Ricky Nunez  [id number]  4/15/2021  @PCP@    He underwent cardiac catheterization.    Indications for the procedure include: shortness of breath.    Procedure Details:  The risks, benefits, complications, treatment options, and expected outcomes were discussed with the patient. The patient and/or family concurred with the proposed plan, giving informed consent.    After informed consent the patient was brought to the cath lab after appropriate IV hydration was begun and oral premedication was given. He was further sedated with fentanyl. He was prepped and draped in the usual manner. Using the modified Seldinger access technique, a 6 Dominican sheath was placed in the femoral artery. A left heart catheterization with coronary arteriography was performed. Findings are discussed below.  JR4 catheter and also a 5 Dominican multipurpose catheter was used to do the selective injection of the vein graft to the diagonal and also selective injection of the vein graft to the marginal and selective injection of the LIMA to the LAD.  Patient tolerated procedure well with no immediate post fluid  complications.    After the procedure was completed, sedation was stopped and the sheaths and catheters were all removed. Hemostasis was achieved per established hospital protocols.    Conscious sedation:  Conscious sedation was performed according to protocol.  I supervised and directed an independent trained observer with the assistance of monitoring the patient's level of consciousness and physiologic status throughout the procedure.  Intraoperative service time was 45 minutes.    Findings:    Hemodynamics Central aortic pressure systolic 145 and diastolic 59 with a mean pressure of 106 mmHg  LV end-diastolic pressure was 26 mmHg  There was no gradient across the aortic valve on the pullback of the pigtail catheter  Left Main  left main is angiographically normal bifurcates into left anterior descending and left circumflex arteries.  Short left main with no significant obstructive coronary artery disease involving the left main coronary artery  RCA  nondominant vessel provides only marginal branches has a mid focal area of 50% stenosis  LAD  large-caliber vessel has a mid angiographic 70% stenosis  Diagonal branches 100% occluded in the ostial portion  Circ  mid focal area of 70 to 80% stenosis before the marginal branches  There is also a moderate to large size ramus intermediate branch with a mid angiographic 30 to 40% stenosis  SVG(s)  vein graft to marginal is patent with diffuse angiographic 50 to 60% stenosis involving the ostium and proximal 30 to 40 mm rest of the body of the vein graft without any significant obstructive disease involving the body and also the anastomotic site.  This marginal graft retrogradely fills other marginals all the way into the left main  Vein graft to the diagonal is patent without any significant stenosis involving the ostium body or the anastomotic site  ASHLEY  patent LIMA to the LAD with no evidence of any stenosis involving the ostium / body or the anastomotic site there is  "significant competitive flow in the native LAD so there is not much filling of the LAD from the LIMA even the LIMA is open  LV  normal LV systolic function normal wall motion normal left-sided filling pressures.  Estimated LV ejection fraction of 60%.  Coronary Dominance  left circumflex artery    Estimated Blood Loss:  Minimal    Specimens: None    Complications:  None; patient tolerated the procedure well.    Disposition: PACU - hemodynamically stable.    Condition: stable    Impressions:  Severe native two-vessel coronary artery disease  Significant obstructive disease involving the mid circumflex mid LAD and diagonal branch of the LAD  Patent LIMA to the LAD  Patent vein graft to the diagonal  Patent vein graft to the marginal  Normal LV systolic function with estimated LV ejection fraction of 60%  Elevated left-sided filling pressures    Recommendations:  Medical management for after coronary artery disease  Okay to resume anticoagulation therapy with Coumadin  Continued aggressive risk factor modification  Test results reviewed and discussed with patient and family     No results found for: \"CHOL\", \"CHLPL\", \"TRIG\", \"HDL\", \"LDL\", \"LDLDIRECT\"   Results for orders placed during the hospital encounter of 01/18/21    Stress Test With Myocardial Perfusion One Day    Interpretation Summary  · Myocardial perfusion imaging indicates a normal myocardial perfusion study with no evidence of ischemia.  · Left ventricular ejection fraction is normal. (Calculated EF = 58%).  · Impressions are consistent with a low risk study.   Results for orders placed during the hospital encounter of 05/04/23    Mobile Cardiac Outpatient Telemetry    Interpretation Summary  Extended Holter monitor study  Patient was monitored from May 4, 2023 to June 2, 2023  Underlying rhythm is sinus rhythm with minimal heart rate of 50 bpm maximal heart rate of 132 beats minute average heart rate of 65 beats per  No clinically significant " pauses  First-degree AV block  A-fib burden of 12%  During A-fib episodes patient a minimal heart rate was 50 bpm maximal rate of 120 beats minute average heart rate of 70 bpm  PVC burden of 1%  PAC burden of 4%  Clinical correlation is           Objective:          Allergies:  No Known Allergies    Medication Review:     Current Outpatient Medications:     albuterol (PROVENTIL HFA;VENTOLIN HFA) 108 (90 Base) MCG/ACT inhaler, Inhale 2 puffs Every 4 (Four) Hours As Needed for Wheezing., Disp: , Rfl:     ALPRAZolam (XANAX) 0.25 MG tablet, Take 1 tablet by mouth 2 (Two) Times a Day As Needed for Anxiety., Disp: , Rfl:     amLODIPine (NORVASC) 10 MG tablet, Take 1 tablet by mouth Daily., Disp: , Rfl:     aspirin 81 MG EC tablet, Take 1 tablet by mouth Daily., Disp: , Rfl:     atorvastatin (LIPITOR) 80 MG tablet, Take 1 tablet by mouth Every Night., Disp: , Rfl:     bumetanide (BUMEX) 1 MG tablet, Take 1 tablet by mouth Daily. (Patient taking differently: Take 1 tablet by mouth Daily. Two tabs daily), Disp: 30 tablet, Rfl: 2    carvedilol (COREG) 6.25 MG tablet, Take 1 tablet by mouth 2 (Two) Times a Day With Meals., Disp: , Rfl:     escitalopram (LEXAPRO) 10 MG tablet, Take 1 tablet by mouth Daily., Disp: , Rfl:     Farxiga 10 MG tablet, Take 10 mg by mouth Daily., Disp: , Rfl:     FeroSul 325 (65 Fe) MG tablet, Take 1 tablet by mouth Every 12 (Twelve) Hours., Disp: , Rfl:     gabapentin (NEURONTIN) 100 MG capsule, Take 1 capsule by mouth 3 (Three) Times a Day., Disp: , Rfl:     glipizide (GLUCOTROL XL) 10 MG 24 hr tablet, Take 1 tablet by mouth Daily., Disp: , Rfl:     HYDROcodone-acetaminophen (NORCO) 7.5-325 MG per tablet, Take  by mouth See Admin Instructions. Take 1 tablet by mouth every 4-6 hours as needed for pain, Disp: , Rfl:     metFORMIN (GLUCOPHAGE) 1000 MG tablet, Take 1 tablet by mouth 2 (Two) Times a Day With Meals., Disp: , Rfl:     methocarbamol (ROBAXIN) 750 MG tablet, TAKE 1 TABLET BY MOUTH EVERY 8  HOURS AS NEEDED FOR SPASMS, Disp: , Rfl:     omeprazole (priLOSEC) 20 MG capsule, Take 1 capsule by mouth Daily., Disp: , Rfl:     ondansetron ODT (ZOFRAN-ODT) 4 MG disintegrating tablet, , Disp: , Rfl:     potassium chloride ER (K-TAB) 20 MEQ tablet controlled-release ER tablet, , Disp: , Rfl:     predniSONE (DELTASONE) 10 MG tablet, , Disp: , Rfl:     warfarin (COUMADIN) 5 MG tablet, TAKE 1 1/2 TABLET BY MOUTH 3 DAYS PER WEEK AND 1 TABLET ALL OTHER DAYS OR AS DIRECTED, Disp: 120 tablet, Rfl: 1    Family History:  Family History   Problem Relation Age of Onset    Heart disease Mother     Malig Hyperthermia Neg Hx        Past Medical History:  Past Medical History:   Diagnosis Date    Acid reflux     Anxiety     Arthritis     Cancer     SKIN    COPD (chronic obstructive pulmonary disease)     Coronary artery disease     Depression     Heart attack     Hyperlipidemia     Hypertension     Knee pain     LEFT    Seasonal allergies     Sleep apnea     CPAP       Past surgical History:  Past Surgical History:   Procedure Laterality Date    CARDIAC CATHETERIZATION N/A 4/15/2021    Procedure: Left Heart Cath;  Surgeon: Staci Estrada MD;  Location: [facility] INVASIVE LOCATION;  Service: Cardiovascular;  Laterality: N/A;  with SVG and LIMA injections    CARDIAC CATHETERIZATION  4/15/2021    Procedure: Saphenous Vein Graft;  Surgeon: Staci Estrada MD;  Location: [facility] INVASIVE LOCATION;  Service: Cardiovascular;;    CHOLECYSTECTOMY OPEN      CORONARY ARTERY BYPASS GRAFT  2015    KNEE SURGERY Left     NM ARTHRP KNE CONDYLE&PLATU MEDIAL&LAT COMPARTMENTS Left 10/10/2017    Procedure: LT TOTAL KNEE ARTHROPLASTY;  Surgeon: Maksim Pinto MD;  Location: [facility] OR;  Service: Orthopedics       Social History:  Social History     Socioeconomic History    Marital status:    Tobacco Use    Smoking status: Former     Current packs/day: 0.00     Average packs/day: 2.0 packs/day for 50.0 years  (100.0 ttl pk-yrs)     Types: Cigarettes     Start date: 1965     Quit date: 2015     Years since quittin.6    Smokeless tobacco: Never   Vaping Use    Vaping status: Never Used   Substance and Sexual Activity    Alcohol use: No    Drug use: No    Sexual activity: Defer       Review of Systems:  The following systems were reviewed as they relate to the cardiovascular system: Constitutional, Eyes, ENT, Cardiovascular, Respiratory, Gastrointestinal, Integumentary, Neurological, Psychiatric, Hematologic, Endocrine, Musculoskeletal, and Genitourinary. The pertinent cardiovascular findings are reported above with all other cardiovascular points within those systems being negative.    Diagnostic Study Review:     Current Electrocardiogram:    ECG 12 Lead    Date/Time: 5/3/2024 1:30 PM  Performed by: Staci Estrada MD    Authorized by: Staci Estrada MD  Comparison: compared with previous ECG   Similar to previous ECG  Rhythm: sinus rhythm  Rate: normal  BPM: 61  Conduction: conduction normal  QRS axis: normal  Other findings: non-specific ST-T wave changes    Clinical impression: abnormal EKG          Advance Care Planning   ACP discussion was held with the patient during this visit. Patient has an advance directive in EMR which is still valid.           NOTE: The following portions of the patient's history were reviewed and updated this visit as appropriate: allergies, current medications, past family history, past medical history, past social history, past surgical history and problem list.

## 2024-05-21 RX ORDER — WARFARIN SODIUM 5 MG/1
TABLET ORAL
Qty: 120 TABLET | Refills: 0 | Status: SHIPPED | OUTPATIENT
Start: 2024-05-21

## 2024-06-04 ENCOUNTER — ANTICOAGULATION VISIT (OUTPATIENT)
Dept: CARDIOLOGY | Facility: CLINIC | Age: 79
End: 2024-06-04
Payer: MEDICARE

## 2024-06-04 DIAGNOSIS — I48.11 LONGSTANDING PERSISTENT ATRIAL FIBRILLATION: Primary | ICD-10-CM

## 2024-06-04 LAB — INR PPP: 2.3 (ref 0.9–1.1)

## 2024-06-04 PROCEDURE — 85610 PROTHROMBIN TIME: CPT | Performed by: INTERNAL MEDICINE

## 2024-06-04 PROCEDURE — 36416 COLLJ CAPILLARY BLOOD SPEC: CPT | Performed by: INTERNAL MEDICINE

## 2024-06-04 PROCEDURE — 93793 ANTICOAG MGMT PT WARFARIN: CPT | Performed by: INTERNAL MEDICINE

## 2024-06-21 ENCOUNTER — TELEPHONE (OUTPATIENT)
Dept: CARDIOLOGY | Facility: CLINIC | Age: 79
End: 2024-06-21
Payer: MEDICARE

## 2024-06-21 NOTE — TELEPHONE ENCOUNTER
Patient called complaining of arm and neck pain.  Patient stated same symptoms he had when he had MI.  Patient was advised to proceed to the nearest ER to be evaluated.

## 2024-06-24 ENCOUNTER — TELEPHONE (OUTPATIENT)
Dept: CARDIOLOGY | Facility: CLINIC | Age: 79
End: 2024-06-24
Payer: MEDICARE

## 2024-06-24 DIAGNOSIS — R07.89 CHEST PRESSURE: ICD-10-CM

## 2024-06-24 DIAGNOSIS — I48.11 LONGSTANDING PERSISTENT ATRIAL FIBRILLATION: Primary | ICD-10-CM

## 2024-06-24 DIAGNOSIS — I25.10 CORONARY ARTERY DISEASE INVOLVING NATIVE CORONARY ARTERY OF NATIVE HEART WITHOUT ANGINA PECTORIS: ICD-10-CM

## 2024-06-24 NOTE — TELEPHONE ENCOUNTER
Patient was at [facility] chest pain this weekend they recommended a stress test. They wanted to see what you advise.

## 2024-06-27 ENCOUNTER — HOSPITAL ENCOUNTER (OUTPATIENT)
Dept: CARDIOLOGY | Facility: HOSPITAL | Age: 79
Discharge: HOME | End: 2024-06-27
Payer: MEDICARE

## 2024-06-27 DIAGNOSIS — R07.89 CHEST PRESSURE: ICD-10-CM

## 2024-06-27 DIAGNOSIS — I25.10 CORONARY ARTERY DISEASE INVOLVING NATIVE CORONARY ARTERY OF NATIVE HEART WITHOUT ANGINA PECTORIS: ICD-10-CM

## 2024-06-27 DIAGNOSIS — I48.11 LONGSTANDING PERSISTENT ATRIAL FIBRILLATION: ICD-10-CM

## 2024-06-27 LAB
BH CV REST NUCLEAR ISOTOPE DOSE: 12 MCI
BH CV STRESS COMMENTS STAGE 1: NORMAL
BH CV STRESS DOSE REGADENOSON STAGE 1: 0.4
BH CV STRESS DURATION MIN STAGE 1: 0
BH CV STRESS DURATION SEC STAGE 1: 10
BH CV STRESS NUCLEAR ISOTOPE DOSE: 34.4 MCI
BH CV STRESS PROTOCOL 1: NORMAL
BH CV STRESS RECOVERY BP: NORMAL MMHG
BH CV STRESS RECOVERY HR: 62 BPM
BH CV STRESS STAGE 1: 1
LV EF NUC BP: 66 %
MAXIMAL PREDICTED HEART RATE: 143 BPM
PERCENT MAX PREDICTED HR: 50.35 %
STRESS BASELINE BP: NORMAL MMHG
STRESS BASELINE HR: 53 BPM
STRESS PERCENT HR: 59 %
STRESS POST PEAK BP: NORMAL MMHG
STRESS POST PEAK HR: 72 BPM
STRESS TARGET HR: 122 BPM

## 2024-06-27 PROCEDURE — A9500 TC99M SESTAMIBI: HCPCS | Performed by: INTERNAL MEDICINE

## 2024-06-27 PROCEDURE — 93017 CV STRESS TEST TRACING ONLY: CPT

## 2024-06-27 PROCEDURE — 0 TECHNETIUM SESTAMIBI: Performed by: INTERNAL MEDICINE

## 2024-06-27 PROCEDURE — 78452 HT MUSCLE IMAGE SPECT MULT: CPT

## 2024-06-27 PROCEDURE — 25010000002 REGADENOSON 0.4 MG/5ML SOLUTION: Performed by: INTERNAL MEDICINE

## 2024-06-27 RX ORDER — REGADENOSON 0.08 MG/ML
0.4 INJECTION, SOLUTION INTRAVENOUS
Status: COMPLETED | OUTPATIENT
Start: 2024-06-27 | End: 2024-06-27

## 2024-06-27 RX ADMIN — TECHNETIUM TC 99M SESTAMIBI 1 DOSE: 1 INJECTION INTRAVENOUS at 09:34

## 2024-06-27 RX ADMIN — TECHNETIUM TC 99M SESTAMIBI 1 DOSE: 1 INJECTION INTRAVENOUS at 09:39

## 2024-06-27 RX ADMIN — REGADENOSON 0.4 MG: 0.08 INJECTION, SOLUTION INTRAVENOUS at 09:39

## 2024-07-09 ENCOUNTER — ANTICOAGULATION VISIT (OUTPATIENT)
Dept: CARDIOLOGY | Facility: CLINIC | Age: 79
End: 2024-07-09
Payer: MEDICARE

## 2024-07-09 DIAGNOSIS — I48.11 LONGSTANDING PERSISTENT ATRIAL FIBRILLATION: Primary | ICD-10-CM

## 2024-07-09 LAB — INR PPP: 2.8 (ref 0.9–1.1)

## 2024-07-09 PROCEDURE — 93793 ANTICOAG MGMT PT WARFARIN: CPT | Performed by: INTERNAL MEDICINE

## 2024-07-09 PROCEDURE — 36416 COLLJ CAPILLARY BLOOD SPEC: CPT | Performed by: INTERNAL MEDICINE

## 2024-07-09 PROCEDURE — 85610 PROTHROMBIN TIME: CPT | Performed by: INTERNAL MEDICINE

## 2024-08-16 ENCOUNTER — ANTICOAGULATION VISIT (OUTPATIENT)
Dept: CARDIOLOGY | Facility: CLINIC | Age: 79
End: 2024-08-16
Payer: MEDICARE

## 2024-08-16 DIAGNOSIS — I48.11 LONGSTANDING PERSISTENT ATRIAL FIBRILLATION: Primary | ICD-10-CM

## 2024-08-16 LAB — INR PPP: 1.1 (ref 0.9–1.1)

## 2024-08-16 PROCEDURE — 85610 PROTHROMBIN TIME: CPT | Performed by: INTERNAL MEDICINE

## 2024-08-16 PROCEDURE — 36416 COLLJ CAPILLARY BLOOD SPEC: CPT | Performed by: INTERNAL MEDICINE

## 2024-08-16 PROCEDURE — 93793 ANTICOAG MGMT PT WARFARIN: CPT | Performed by: INTERNAL MEDICINE

## 2024-08-23 ENCOUNTER — ANTICOAGULATION VISIT (OUTPATIENT)
Dept: CARDIOLOGY | Facility: CLINIC | Age: 79
End: 2024-08-23
Payer: MEDICARE

## 2024-08-23 DIAGNOSIS — I48.11 LONGSTANDING PERSISTENT ATRIAL FIBRILLATION: Primary | ICD-10-CM

## 2024-08-23 LAB — INR PPP: 2.1 (ref 0.9–1.1)

## 2024-08-23 PROCEDURE — 93793 ANTICOAG MGMT PT WARFARIN: CPT | Performed by: INTERNAL MEDICINE

## 2024-08-23 PROCEDURE — 36416 COLLJ CAPILLARY BLOOD SPEC: CPT | Performed by: INTERNAL MEDICINE

## 2024-08-23 PROCEDURE — 85610 PROTHROMBIN TIME: CPT | Performed by: INTERNAL MEDICINE

## 2024-09-06 ENCOUNTER — ANTICOAGULATION VISIT (OUTPATIENT)
Dept: CARDIOLOGY | Facility: CLINIC | Age: 79
End: 2024-09-06
Payer: MEDICARE

## 2024-09-06 DIAGNOSIS — I48.20 ATRIAL FIBRILLATION, CHRONIC: Primary | ICD-10-CM

## 2024-09-06 LAB — INR PPP: 3.3 (ref 0.9–1.1)

## 2024-09-06 PROCEDURE — 85610 PROTHROMBIN TIME: CPT | Performed by: INTERNAL MEDICINE

## 2024-09-06 PROCEDURE — 36416 COLLJ CAPILLARY BLOOD SPEC: CPT | Performed by: INTERNAL MEDICINE

## 2024-09-06 PROCEDURE — 93793 ANTICOAG MGMT PT WARFARIN: CPT | Performed by: INTERNAL MEDICINE

## 2024-09-20 ENCOUNTER — ANTICOAGULATION VISIT (OUTPATIENT)
Dept: CARDIOLOGY | Facility: CLINIC | Age: 79
End: 2024-09-20
Payer: MEDICARE

## 2024-09-20 DIAGNOSIS — I48.11 LONGSTANDING PERSISTENT ATRIAL FIBRILLATION: Primary | ICD-10-CM

## 2024-09-20 LAB — INR PPP: 4 (ref 0.9–1.1)

## 2024-09-20 PROCEDURE — 85610 PROTHROMBIN TIME: CPT | Performed by: INTERNAL MEDICINE

## 2024-09-20 PROCEDURE — 36416 COLLJ CAPILLARY BLOOD SPEC: CPT | Performed by: INTERNAL MEDICINE

## 2024-09-20 PROCEDURE — 93793 ANTICOAG MGMT PT WARFARIN: CPT | Performed by: INTERNAL MEDICINE

## 2024-10-04 ENCOUNTER — ANTICOAGULATION VISIT (OUTPATIENT)
Dept: CARDIOLOGY | Facility: CLINIC | Age: 79
End: 2024-10-04
Payer: MEDICARE

## 2024-10-04 DIAGNOSIS — I48.11 LONGSTANDING PERSISTENT ATRIAL FIBRILLATION: Primary | ICD-10-CM

## 2024-10-04 LAB — INR PPP: 3.1 (ref 0.9–1.1)

## 2024-10-04 PROCEDURE — 85610 PROTHROMBIN TIME: CPT | Performed by: INTERNAL MEDICINE

## 2024-10-04 PROCEDURE — 36416 COLLJ CAPILLARY BLOOD SPEC: CPT | Performed by: INTERNAL MEDICINE

## 2024-10-04 PROCEDURE — 93793 ANTICOAG MGMT PT WARFARIN: CPT | Performed by: INTERNAL MEDICINE

## 2024-10-25 ENCOUNTER — ANTICOAGULATION VISIT (OUTPATIENT)
Dept: CARDIOLOGY | Facility: CLINIC | Age: 79
End: 2024-10-25
Payer: MEDICARE

## 2024-10-25 DIAGNOSIS — I48.11 LONGSTANDING PERSISTENT ATRIAL FIBRILLATION: Primary | ICD-10-CM

## 2024-10-25 LAB — INR PPP: 2 (ref 0.9–1.1)

## 2024-10-25 PROCEDURE — 36416 COLLJ CAPILLARY BLOOD SPEC: CPT | Performed by: INTERNAL MEDICINE

## 2024-10-25 PROCEDURE — 85610 PROTHROMBIN TIME: CPT | Performed by: INTERNAL MEDICINE

## 2024-10-25 PROCEDURE — 93793 ANTICOAG MGMT PT WARFARIN: CPT | Performed by: INTERNAL MEDICINE

## 2024-10-30 RX ORDER — WARFARIN SODIUM 5 MG/1
TABLET ORAL
Qty: 120 TABLET | Refills: 0 | Status: SHIPPED | OUTPATIENT
Start: 2024-10-30

## 2024-11-15 ENCOUNTER — OFFICE VISIT (OUTPATIENT)
Dept: CARDIOLOGY | Facility: CLINIC | Age: 79
End: 2024-11-15
Payer: MEDICARE

## 2024-11-15 VITALS
BODY MASS INDEX: 34.19 KG/M2 | OXYGEN SATURATION: 93 % | HEART RATE: 61 BPM | HEIGHT: 70 IN | DIASTOLIC BLOOD PRESSURE: 75 MMHG | WEIGHT: 238.8 LBS | SYSTOLIC BLOOD PRESSURE: 135 MMHG

## 2024-11-15 DIAGNOSIS — I25.10 CORONARY ARTERY DISEASE INVOLVING NATIVE CORONARY ARTERY OF NATIVE HEART WITHOUT ANGINA PECTORIS: ICD-10-CM

## 2024-11-15 DIAGNOSIS — I10 PRIMARY HYPERTENSION: ICD-10-CM

## 2024-11-15 DIAGNOSIS — I48.20 ATRIAL FIBRILLATION, CHRONIC: ICD-10-CM

## 2024-11-15 DIAGNOSIS — E78.2 MIXED HYPERLIPIDEMIA: ICD-10-CM

## 2024-11-15 DIAGNOSIS — I10 ESSENTIAL HYPERTENSION: ICD-10-CM

## 2024-11-15 DIAGNOSIS — I48.11 LONGSTANDING PERSISTENT ATRIAL FIBRILLATION: Primary | ICD-10-CM

## 2024-11-15 PROCEDURE — 1160F RVW MEDS BY RX/DR IN RCRD: CPT | Performed by: INTERNAL MEDICINE

## 2024-11-15 PROCEDURE — 3078F DIAST BP <80 MM HG: CPT | Performed by: INTERNAL MEDICINE

## 2024-11-15 PROCEDURE — 1159F MED LIST DOCD IN RCRD: CPT | Performed by: INTERNAL MEDICINE

## 2024-11-15 PROCEDURE — 3075F SYST BP GE 130 - 139MM HG: CPT | Performed by: INTERNAL MEDICINE

## 2024-11-15 PROCEDURE — 99214 OFFICE O/P EST MOD 30 MIN: CPT | Performed by: INTERNAL MEDICINE

## 2024-11-15 PROCEDURE — 93000 ELECTROCARDIOGRAM COMPLETE: CPT | Performed by: INTERNAL MEDICINE

## 2024-11-15 RX ORDER — LISINOPRIL 5 MG/1
5 TABLET ORAL DAILY
COMMUNITY
Start: 2024-11-15

## 2024-11-15 RX ORDER — AMLODIPINE BESYLATE 5 MG/1
5 TABLET ORAL DAILY
Qty: 90 TABLET | Refills: 3 | Status: SHIPPED | OUTPATIENT
Start: 2024-11-15

## 2024-11-15 NOTE — PROGRESS NOTES
Cardiology Office Visit      Encounter Date:  11/15/2024    Patient ID:   Ricky Nunez is a 79 y.o. male.      Reason For Followup:  Shortness of breath  Coronary artery disease  Hypertension    Brief Clinical History:  Dear Dr. Mora, Dc Bearden MD    I had the pleasure of seeing Ricky Nunez today. As you are well aware, this is a 79 y.o. male with a past medical history that is significant for history of hypertension and hyperlipidemia history of acute myocardial infarction cardiac catheterization showed evidence of a severe 2 vessel coronary artery disease with significant stenosis involving the diagonal branch of the LAD with subtotal occlusion that was the culprit lesion for the acute myocardial infarction successfully treated with a placement of a drug eluting stent. Repeat Cardiac catheterization at that time showed evidence of residual disease involving the LAD and also a right coronary artery, His IVUS evaluation of the LAD showed a significant stenosis patient underwent a successful coronary artery bypass surgery patient did very well.   Stress test with no evidence of any significant inducible ischemia  echocardiogram with normal LV systolic function and diastolic dysfunction    Impressions:/April 2021  Severe native two-vessel coronary artery disease  Significant obstructive disease involving the mid circumflex mid LAD and diagonal branch of the LAD  Patent LIMA to the LAD  Patent vein graft to the diagonal  Patent vein graft to the marginal  Normal LV systolic function with estimated LV ejection fraction of 60%  Elevated left-sided filling pressures    Interpretation Summary    Left ventricular wall thickness is consistent with concentric hypertrophy.  Estimated left ventricular EF = 60% Estimated left ventricular EF was in agreement with the calculated left ventricular EF. Left ventricular systolic function is normal.  Mild dilation of the aortic root is present.  Estimated right  ventricular systolic pressure from tricuspid regurgitation is normal (<35 mmHg).  Left ventricular diastolic function is consistent with (grade I) impaired relaxation.       Interval History:  Patient denies any symptoms of chest pain  dizziness or syncope  Denies any new cardiac symptoms  Compliant with medical therapy  Tolerating anticoagulation therapy  Planing of some lower extremity edema likely combination of venous edema and also high-dose Norvasc  Patient was recently started primary care physician unless no problems for proteinuria  Assessment & Plan    Impressions:  Shortness of breath and dyspnea on exertion which is progressively getting worse  Hypertension  Coronary artery disease  Prior coronary artery bypass surgery  Obesity  Obstructive sleep apnea  Paroxysmal atrial fibrillation/initial intent was to cardiovert patient in sinus rhythm to see if it will help with symptoms but patient spontaneously reverted back to sinus rhythm but now back in A. fib with rate control  Patient is currently maintaining sinus rhythm  Tolerating anticoagulation therapy  COPD and chronic hypoxia  Paroxysmal atrial fibrillation patient is currently in atrial fibrillation I am not sure if that is what contributing to his symptoms  Recommendations:  Continued aggressive risk factor modification  Need for weight loss discussed with the patient   continue regular exercise   continue current medical therapy and regular exercise  Continue anticoagulation therapy for stroke prophylaxis for atrial fibrillation  Cardiac catheterization with a patent bypasses  Continue current dose of diuretics but follow-up with primary care physician for renal function assessment and close monitoring on diuretic therapy  Advised patient to continue follow-up with pulmonary CT chest findings reviewed and discussed with patient  Discontinue amiodarone  Continue current dose of beta-blockers  Continue Coumadin and close monitoring of PT/INR  Continue  "close monitoring  Recent labs with primary care physician office  Recent note from the pulmonary evaluation reviewed and discussed with patient  Continue current medical therapy with aspirin 81 mg p.o. once a day warfarin for anticoagulation therapy potassium 20 mg p.o. once a day carvedilol 6.25 mg p.o. twice daily Bumex 1 mg p.o. twice a day Lipitor 80 mg p.o. once a day Norvasc 5 mg p.o. once a day  Continued aggressive risk factor modification use oxygen at nighttime and also use CPAP at nighttime  Regular exercise  Recent cardiac work-up echocardiogram findings reviewed and discussed with patient  Close monitoring of blood pressure at home  Decrease the dose of amlodipine from 10 mg to 5 mg p.o. once a day  Start patient on lisinopril 5 mg p.o. once a day  Risk benefits and alternatives for anticoagulation therapy reviewed and discussed patient  Continue follow-up with the pulmonary and primary care  Continue regular exercise and weight loss  Follow-up in office in 6 months        Vitals:  Vitals:    11/15/24 1348   BP: 135/75   Pulse: 61   SpO2: 93%   Weight: 108 kg (238 lb 12.8 oz)   Height: 177.8 cm (70\")       Physical Exam:    General: Alert, cooperative, no distress, appears stated age  Head:  Normocephalic, atraumatic, mucous membranes moist  Eyes:  Conjunctiva/corneas clear, EOM's intact     Neck:  Supple,  no adenopathy;      Lungs: Clear to auscultation bilaterally, no wheezes rhonchi rales are noted  Chest wall: No tenderness  Heart::  Regular rate and rhythm, S1 and S2 normal, no murmur, rub or gallop  Abdomen: Soft, non-tender, nondistended bowel sounds active  Extremities: No cyanosis, clubbing, or edema  Pulses: 2+ and symmetric all extremities  Skin:  No rashes or lesions  Neuro/psych: A&O x3. CN II through XII are grossly intact with appropriate affect              Lab Results   Component Value Date    GLUCOSE 123 (H) 04/13/2021    BUN 14 04/13/2021    CREATININE 0.99 04/13/2021    BCR 14.1 " 04/13/2021    K 4.2 04/13/2021    CO2 22.8 04/13/2021    CALCIUM 9.3 04/13/2021    ALBUMIN 4.00 09/26/2017    BILITOT 0.4 09/26/2017    AST 21 09/26/2017    ALT 30 09/26/2017     Results for orders placed during the hospital encounter of 01/18/21    Adult Transthoracic Echo Complete W/ Cont if Necessary Per Protocol    Interpretation Summary  · Left ventricular wall thickness is consistent with concentric hypertrophy.  · Estimated left ventricular EF = 60% Estimated left ventricular EF was in agreement with the calculated left ventricular EF. Left ventricular systolic function is normal.  · Mild dilation of the aortic root is present.  · Estimated right ventricular systolic pressure from tricuspid regurgitation is normal (<35 mmHg).  · Left ventricular diastolic function is consistent with (grade I) impaired relaxation.     Results for orders placed during the hospital encounter of 04/15/21    Cardiac Catheterization/Vascular Study    Narrative  Table formatting from the original result was not included.  Cardiac Catheterization Operative Report    Ricky Nunez  [id number]  4/15/2021  @PCP@    He underwent cardiac catheterization.    Indications for the procedure include: shortness of breath.    Procedure Details:  The risks, benefits, complications, treatment options, and expected outcomes were discussed with the patient. The patient and/or family concurred with the proposed plan, giving informed consent.    After informed consent the patient was brought to the cath lab after appropriate IV hydration was begun and oral premedication was given. He was further sedated with fentanyl. He was prepped and draped in the usual manner. Using the modified Seldinger access technique, a 6 Vietnamese sheath was placed in the femoral artery. A left heart catheterization with coronary arteriography was performed. Findings are discussed below.  JR4 catheter and also a 5 Vietnamese multipurpose catheter was used to do the selective  injection of the vein graft to the diagonal and also selective injection of the vein graft to the marginal and selective injection of the LIMA to the LAD.  Patient tolerated procedure well with no immediate post fluid complications.    After the procedure was completed, sedation was stopped and the sheaths and catheters were all removed. Hemostasis was achieved per established hospital protocols.    Conscious sedation:  Conscious sedation was performed according to protocol.  I supervised and directed an independent trained observer with the assistance of monitoring the patient's level of consciousness and physiologic status throughout the procedure.  Intraoperative service time was 45 minutes.    Findings:    Hemodynamics Central aortic pressure systolic 145 and diastolic 59 with a mean pressure of 106 mmHg  LV end-diastolic pressure was 26 mmHg  There was no gradient across the aortic valve on the pullback of the pigtail catheter  Left Main  left main is angiographically normal bifurcates into left anterior descending and left circumflex arteries.  Short left main with no significant obstructive coronary artery disease involving the left main coronary artery  RCA  nondominant vessel provides only marginal branches has a mid focal area of 50% stenosis  LAD  large-caliber vessel has a mid angiographic 70% stenosis  Diagonal branches 100% occluded in the ostial portion  Circ  mid focal area of 70 to 80% stenosis before the marginal branches  There is also a moderate to large size ramus intermediate branch with a mid angiographic 30 to 40% stenosis  SVG(s)  vein graft to marginal is patent with diffuse angiographic 50 to 60% stenosis involving the ostium and proximal 30 to 40 mm rest of the body of the vein graft without any significant obstructive disease involving the body and also the anastomotic site.  This marginal graft retrogradely fills other marginals all the way into the left main  Vein graft to the diagonal  "is patent without any significant stenosis involving the ostium body or the anastomotic site  ASHLEY  patent LIMA to the LAD with no evidence of any stenosis involving the ostium / body or the anastomotic site there is significant competitive flow in the native LAD so there is not much filling of the LAD from the LIMA even the LIMA is open  LV  normal LV systolic function normal wall motion normal left-sided filling pressures.  Estimated LV ejection fraction of 60%.  Coronary Dominance  left circumflex artery    Estimated Blood Loss:  Minimal    Specimens: None    Complications:  None; patient tolerated the procedure well.    Disposition: PACU - hemodynamically stable.    Condition: stable    Impressions:  Severe native two-vessel coronary artery disease  Significant obstructive disease involving the mid circumflex mid LAD and diagonal branch of the LAD  Patent LIMA to the LAD  Patent vein graft to the diagonal  Patent vein graft to the marginal  Normal LV systolic function with estimated LV ejection fraction of 60%  Elevated left-sided filling pressures    Recommendations:  Medical management for after coronary artery disease  Okay to resume anticoagulation therapy with Coumadin  Continued aggressive risk factor modification  Test results reviewed and discussed with patient and family     No results found for: \"CHOL\", \"CHLPL\", \"TRIG\", \"HDL\", \"LDL\", \"LDLDIRECT\"   Results for orders placed during the hospital encounter of 06/27/24    Stress Test With Myocardial Perfusion One Day    Interpretation Summary    Myocardial perfusion imaging indicates a normal myocardial perfusion study with no evidence of ischemia.    Left ventricular ejection fraction is normal (Calculated EF = 66%).   Results for orders placed during the hospital encounter of 05/04/23    Mobile Cardiac Outpatient Telemetry    Interpretation Summary  Extended Holter monitor study  Patient was monitored from May 4, 2023 to June 2, 2023  Underlying rhythm is " sinus rhythm with minimal heart rate of 50 bpm maximal heart rate of 132 beats minute average heart rate of 65 beats per  No clinically significant pauses  First-degree AV block  A-fib burden of 12%  During A-fib episodes patient a minimal heart rate was 50 bpm maximal rate of 120 beats minute average heart rate of 70 bpm  PVC burden of 1%  PAC burden of 4%  Clinical correlation is           Objective:          Allergies:  No Known Allergies    Medication Review:     Current Outpatient Medications:     albuterol (PROVENTIL HFA;VENTOLIN HFA) 108 (90 Base) MCG/ACT inhaler, Inhale 2 puffs Every 4 (Four) Hours As Needed for Wheezing., Disp: , Rfl:     ALPRAZolam (XANAX) 0.25 MG tablet, Take 1 tablet by mouth 2 (Two) Times a Day As Needed for Anxiety., Disp: , Rfl:     aspirin 81 MG EC tablet, Take 1 tablet by mouth Daily., Disp: , Rfl:     atorvastatin (LIPITOR) 80 MG tablet, Take 1 tablet by mouth Every Night., Disp: , Rfl:     bumetanide (BUMEX) 1 MG tablet, Take 1 tablet by mouth Daily. (Patient taking differently: Take 1 tablet by mouth Daily. Two tabs daily), Disp: 30 tablet, Rfl: 2    carvedilol (COREG) 6.25 MG tablet, Take 1 tablet by mouth 2 (Two) Times a Day With Meals., Disp: , Rfl:     escitalopram (LEXAPRO) 10 MG tablet, Take 1 tablet by mouth Daily., Disp: , Rfl:     Farxiga 10 MG tablet, Take 10 mg by mouth Daily., Disp: , Rfl:     FeroSul 325 (65 Fe) MG tablet, Take 1 tablet by mouth Every 12 (Twelve) Hours., Disp: , Rfl:     gabapentin (NEURONTIN) 100 MG capsule, Take 1 capsule by mouth 3 (Three) Times a Day., Disp: , Rfl:     glipizide (GLUCOTROL XL) 10 MG 24 hr tablet, Take 1 tablet by mouth Daily., Disp: , Rfl:     HYDROcodone-acetaminophen (NORCO) 7.5-325 MG per tablet, Take  by mouth See Admin Instructions. Take 1 tablet by mouth every 4-6 hours as needed for pain, Disp: , Rfl:     metFORMIN (GLUCOPHAGE) 1000 MG tablet, Take 1 tablet by mouth 2 (Two) Times a Day With Meals., Disp: , Rfl:      methocarbamol (ROBAXIN) 750 MG tablet, TAKE 1 TABLET BY MOUTH EVERY 8 HOURS AS NEEDED FOR SPASMS, Disp: , Rfl:     omeprazole (priLOSEC) 20 MG capsule, Take 1 capsule by mouth Daily., Disp: , Rfl:     ondansetron ODT (ZOFRAN-ODT) 4 MG disintegrating tablet, , Disp: , Rfl:     potassium chloride ER (K-TAB) 20 MEQ tablet controlled-release ER tablet, , Disp: , Rfl:     predniSONE (DELTASONE) 10 MG tablet, , Disp: , Rfl:     warfarin (COUMADIN) 5 MG tablet, TAKE 1.5 TABLETS 3 DAYS PER WEEK AND 1 TABLET ON ALL OTHER DAYS OR AS DIRECTED BY MD, Disp: 120 tablet, Rfl: 0    amLODIPine (NORVASC) 5 MG tablet, Take 1 tablet by mouth Daily., Disp: 90 tablet, Rfl: 3    Family History:  Family History   Problem Relation Age of Onset    Heart disease Mother     Malig Hyperthermia Neg Hx        Past Medical History:  Past Medical History:   Diagnosis Date    Acid reflux     Anxiety     Arthritis     Cancer     SKIN    COPD (chronic obstructive pulmonary disease)     Coronary artery disease     Depression     Heart attack     Hyperlipidemia     Hypertension     Knee pain     LEFT    Seasonal allergies     Sleep apnea     CPAP       Past surgical History:  Past Surgical History:   Procedure Laterality Date    CARDIAC CATHETERIZATION N/A 4/15/2021    Procedure: Left Heart Cath;  Surgeon: Staci Estrada MD;  Location: [facility] CATH INVASIVE LOCATION;  Service: Cardiovascular;  Laterality: N/A;  with SVG and LIMA injections    CARDIAC CATHETERIZATION  4/15/2021    Procedure: Saphenous Vein Graft;  Surgeon: Staci Estrada MD;  Location: [facility] CATH INVASIVE LOCATION;  Service: Cardiovascular;;    CHOLECYSTECTOMY OPEN      CORONARY ARTERY BYPASS GRAFT  2015    KNEE SURGERY Left     LA ARTHRP KNE CONDYLE&PLATU MEDIAL&LAT COMPARTMENTS Left 10/10/2017    Procedure: LT TOTAL KNEE ARTHROPLASTY;  Surgeon: Maksim Pinto MD;  Location: [facility] OR;  Service: Orthopedics       Social History:  Social History     Socioeconomic  History    Marital status:    Tobacco Use    Smoking status: Former     Current packs/day: 0.00     Average packs/day: 2.0 packs/day for 50.0 years (100.0 ttl pk-yrs)     Types: Cigarettes     Start date: 1965     Quit date: 2015     Years since quittin.1    Smokeless tobacco: Never   Vaping Use    Vaping status: Never Used   Substance and Sexual Activity    Alcohol use: No    Drug use: No    Sexual activity: Defer       Review of Systems:  The following systems were reviewed as they relate to the cardiovascular system: Constitutional, Eyes, ENT, Cardiovascular, Respiratory, Gastrointestinal, Integumentary, Neurological, Psychiatric, Hematologic, Endocrine, Musculoskeletal, and Genitourinary. The pertinent cardiovascular findings are reported above with all other cardiovascular points within those systems being negative.    Diagnostic Study Review:     Current Electrocardiogram:    ECG 12 Lead    Date/Time: 11/15/2024 2:31 PM  Performed by: Staci Estrada MD    Authorized by: Staci Estrada MD  Comparison: compared with previous ECG   Similar to previous ECG  Rhythm: sinus rhythm  Rate: normal  BPM: 61  Conduction: conduction normal  QRS axis: normal  Other findings: non-specific ST-T wave changes    Clinical impression: abnormal EKG                NOTE: The following portions of the patient's history were reviewed and updated this visit as appropriate: allergies, current medications, past family history, past medical history, past social history, past surgical history and problem list.

## 2024-12-27 ENCOUNTER — ANTICOAGULATION VISIT (OUTPATIENT)
Dept: CARDIOLOGY | Facility: CLINIC | Age: 79
End: 2024-12-27
Payer: MEDICARE

## 2024-12-27 DIAGNOSIS — I48.11 LONGSTANDING PERSISTENT ATRIAL FIBRILLATION: Primary | ICD-10-CM

## 2024-12-27 LAB — INR PPP: 1.4 (ref 0.9–1.1)

## 2024-12-27 PROCEDURE — 85610 PROTHROMBIN TIME: CPT | Performed by: INTERNAL MEDICINE

## 2024-12-27 PROCEDURE — 36416 COLLJ CAPILLARY BLOOD SPEC: CPT | Performed by: INTERNAL MEDICINE

## 2024-12-27 PROCEDURE — 93793 ANTICOAG MGMT PT WARFARIN: CPT | Performed by: INTERNAL MEDICINE

## 2025-01-10 ENCOUNTER — ANTICOAGULATION VISIT (OUTPATIENT)
Dept: CARDIOLOGY | Facility: CLINIC | Age: 80
End: 2025-01-10
Payer: MEDICARE

## 2025-01-10 DIAGNOSIS — I48.11 LONGSTANDING PERSISTENT ATRIAL FIBRILLATION: Primary | ICD-10-CM

## 2025-01-10 LAB — INR PPP: 1.8 (ref 0.9–1.1)

## 2025-01-10 PROCEDURE — 36416 COLLJ CAPILLARY BLOOD SPEC: CPT | Performed by: INTERNAL MEDICINE

## 2025-01-10 PROCEDURE — 93793 ANTICOAG MGMT PT WARFARIN: CPT | Performed by: INTERNAL MEDICINE

## 2025-01-10 PROCEDURE — 85610 PROTHROMBIN TIME: CPT | Performed by: INTERNAL MEDICINE

## 2025-01-20 RX ORDER — WARFARIN SODIUM 5 MG/1
TABLET ORAL
Qty: 120 TABLET | Refills: 0 | Status: SHIPPED | OUTPATIENT
Start: 2025-01-20

## 2025-01-24 ENCOUNTER — ANTICOAGULATION VISIT (OUTPATIENT)
Dept: CARDIOLOGY | Facility: CLINIC | Age: 80
End: 2025-01-24
Payer: MEDICARE

## 2025-01-24 DIAGNOSIS — I48.11 LONGSTANDING PERSISTENT ATRIAL FIBRILLATION: Primary | ICD-10-CM

## 2025-01-24 LAB — INR PPP: 2 (ref 0.9–1.1)

## 2025-01-24 PROCEDURE — 36416 COLLJ CAPILLARY BLOOD SPEC: CPT | Performed by: INTERNAL MEDICINE

## 2025-01-24 PROCEDURE — 85610 PROTHROMBIN TIME: CPT | Performed by: INTERNAL MEDICINE

## 2025-01-24 PROCEDURE — 93793 ANTICOAG MGMT PT WARFARIN: CPT | Performed by: INTERNAL MEDICINE

## 2025-02-14 ENCOUNTER — ANTICOAGULATION VISIT (OUTPATIENT)
Dept: CARDIOLOGY | Facility: CLINIC | Age: 80
End: 2025-02-14
Payer: MEDICARE

## 2025-02-14 DIAGNOSIS — I48.11 LONGSTANDING PERSISTENT ATRIAL FIBRILLATION: Primary | ICD-10-CM

## 2025-02-14 LAB — INR PPP: 2.3 (ref 0.9–1.1)

## 2025-02-14 PROCEDURE — 93793 ANTICOAG MGMT PT WARFARIN: CPT | Performed by: INTERNAL MEDICINE

## 2025-02-14 PROCEDURE — 36416 COLLJ CAPILLARY BLOOD SPEC: CPT | Performed by: INTERNAL MEDICINE

## 2025-02-14 PROCEDURE — 85610 PROTHROMBIN TIME: CPT | Performed by: INTERNAL MEDICINE

## 2025-03-14 ENCOUNTER — ANTICOAGULATION VISIT (OUTPATIENT)
Dept: CARDIOLOGY | Facility: CLINIC | Age: 80
End: 2025-03-14
Payer: MEDICARE

## 2025-03-14 DIAGNOSIS — I48.11 LONGSTANDING PERSISTENT ATRIAL FIBRILLATION: Primary | ICD-10-CM

## 2025-03-14 LAB — INR PPP: 1.8 (ref 0.9–1.1)

## 2025-03-14 PROCEDURE — 85610 PROTHROMBIN TIME: CPT | Performed by: INTERNAL MEDICINE

## 2025-03-14 PROCEDURE — 36416 COLLJ CAPILLARY BLOOD SPEC: CPT | Performed by: INTERNAL MEDICINE

## 2025-03-14 PROCEDURE — 93793 ANTICOAG MGMT PT WARFARIN: CPT | Performed by: INTERNAL MEDICINE

## 2025-04-04 ENCOUNTER — ANTICOAGULATION VISIT (OUTPATIENT)
Dept: CARDIOLOGY | Facility: CLINIC | Age: 80
End: 2025-04-04
Payer: MEDICARE

## 2025-04-04 DIAGNOSIS — I48.11 LONGSTANDING PERSISTENT ATRIAL FIBRILLATION: Primary | ICD-10-CM

## 2025-04-04 LAB — INR PPP: 2.5 (ref 0.9–1.1)

## 2025-04-04 PROCEDURE — 93793 ANTICOAG MGMT PT WARFARIN: CPT | Performed by: INTERNAL MEDICINE

## 2025-04-04 PROCEDURE — 85610 PROTHROMBIN TIME: CPT | Performed by: INTERNAL MEDICINE

## 2025-04-04 PROCEDURE — 36416 COLLJ CAPILLARY BLOOD SPEC: CPT | Performed by: INTERNAL MEDICINE

## 2025-05-02 ENCOUNTER — ANTICOAGULATION VISIT (OUTPATIENT)
Dept: CARDIOLOGY | Facility: CLINIC | Age: 80
End: 2025-05-02
Payer: MEDICARE

## 2025-05-02 DIAGNOSIS — I48.11 LONGSTANDING PERSISTENT ATRIAL FIBRILLATION: Primary | ICD-10-CM

## 2025-05-02 LAB — INR PPP: 1.7 (ref 0.9–1.1)

## 2025-05-02 PROCEDURE — 85610 PROTHROMBIN TIME: CPT | Performed by: INTERNAL MEDICINE

## 2025-05-02 PROCEDURE — 36416 COLLJ CAPILLARY BLOOD SPEC: CPT | Performed by: INTERNAL MEDICINE

## 2025-05-02 PROCEDURE — 93793 ANTICOAG MGMT PT WARFARIN: CPT | Performed by: INTERNAL MEDICINE

## 2025-05-16 ENCOUNTER — OFFICE VISIT (OUTPATIENT)
Dept: CARDIOLOGY | Facility: CLINIC | Age: 80
End: 2025-05-16
Payer: MEDICARE

## 2025-05-16 ENCOUNTER — ANTICOAGULATION VISIT (OUTPATIENT)
Dept: CARDIOLOGY | Facility: CLINIC | Age: 80
End: 2025-05-16
Payer: MEDICARE

## 2025-05-16 VITALS
HEIGHT: 70 IN | SYSTOLIC BLOOD PRESSURE: 146 MMHG | BODY MASS INDEX: 36.36 KG/M2 | HEART RATE: 69 BPM | OXYGEN SATURATION: 93 % | WEIGHT: 254 LBS | DIASTOLIC BLOOD PRESSURE: 73 MMHG

## 2025-05-16 DIAGNOSIS — E78.2 MIXED HYPERLIPIDEMIA: ICD-10-CM

## 2025-05-16 DIAGNOSIS — I25.10 CORONARY ARTERY DISEASE INVOLVING NATIVE CORONARY ARTERY OF NATIVE HEART WITHOUT ANGINA PECTORIS: ICD-10-CM

## 2025-05-16 DIAGNOSIS — I48.11 LONGSTANDING PERSISTENT ATRIAL FIBRILLATION: Primary | ICD-10-CM

## 2025-05-16 DIAGNOSIS — I10 PRIMARY HYPERTENSION: ICD-10-CM

## 2025-05-16 DIAGNOSIS — I48.20 ATRIAL FIBRILLATION, CHRONIC: ICD-10-CM

## 2025-05-16 LAB — INR PPP: 2.7 (ref 0.9–1.1)

## 2025-05-16 PROCEDURE — 3077F SYST BP >= 140 MM HG: CPT | Performed by: INTERNAL MEDICINE

## 2025-05-16 PROCEDURE — 3078F DIAST BP <80 MM HG: CPT | Performed by: INTERNAL MEDICINE

## 2025-05-16 PROCEDURE — 93000 ELECTROCARDIOGRAM COMPLETE: CPT | Performed by: INTERNAL MEDICINE

## 2025-05-16 PROCEDURE — 1160F RVW MEDS BY RX/DR IN RCRD: CPT | Performed by: INTERNAL MEDICINE

## 2025-05-16 PROCEDURE — 85610 PROTHROMBIN TIME: CPT | Performed by: INTERNAL MEDICINE

## 2025-05-16 PROCEDURE — 1159F MED LIST DOCD IN RCRD: CPT | Performed by: INTERNAL MEDICINE

## 2025-05-16 PROCEDURE — 36416 COLLJ CAPILLARY BLOOD SPEC: CPT | Performed by: INTERNAL MEDICINE

## 2025-05-16 PROCEDURE — 99214 OFFICE O/P EST MOD 30 MIN: CPT | Performed by: INTERNAL MEDICINE

## 2025-05-16 RX ORDER — BUMETANIDE 2 MG/1
2 TABLET ORAL 2 TIMES DAILY
Qty: 60 TABLET | Refills: 2 | Status: SHIPPED | OUTPATIENT
Start: 2025-05-16

## 2025-05-16 NOTE — PROGRESS NOTES
Cardiology Office Visit      Encounter Date:  05/16/2025    Patient ID:   Ricky Nunez is a 80 y.o. male.    Reason For Followup:  Shortness of breath  Coronary artery disease  Hypertension  Bilateral lower extremity edema    Brief Clinical History:  Dear Dr. Mora, Dc Bearden MD    I had the pleasure of seeing Ricky Nunez today. As you are well aware, this is a 80 y.o. male with a past medical history that is significant for history of hypertension and hyperlipidemia history of acute myocardial infarction cardiac catheterization showed evidence of a severe 2 vessel coronary artery disease with significant stenosis involving the diagonal branch of the LAD with subtotal occlusion that was the culprit lesion for the acute myocardial infarction successfully treated with a placement of a drug eluting stent. Repeat Cardiac catheterization at that time showed evidence of residual disease involving the LAD and also a right coronary artery, His IVUS evaluation of the LAD showed a significant stenosis patient underwent a successful coronary artery bypass surgery patient did very well.   Stress test with no evidence of any significant inducible ischemia  echocardiogram with normal LV systolic function and diastolic dysfunction    Impressions:/April 2021  Severe native two-vessel coronary artery disease  Significant obstructive disease involving the mid circumflex mid LAD and diagonal branch of the LAD  Patent LIMA to the LAD  Patent vein graft to the diagonal  Patent vein graft to the marginal  Normal LV systolic function with estimated LV ejection fraction of 60%  Elevated left-sided filling pressures    Interpretation Summary    Left ventricular wall thickness is consistent with concentric hypertrophy.  Estimated left ventricular EF = 60% Estimated left ventricular EF was in agreement with the calculated left ventricular EF. Left ventricular systolic function is normal.  Mild dilation of the aortic root is  present.  Estimated right ventricular systolic pressure from tricuspid regurgitation is normal (<35 mmHg).  Left ventricular diastolic function is consistent with (grade I) impaired relaxation.       Interval History:  Patient denies any symptoms of chest pain  dizziness or syncope  Denies any new cardiac symptoms  Compliant with medical therapy  Tolerating anticoagulation therapy  Planing of some lower extremity edema likely combination of venous edema and also high-dose Norvasc  Patient was recently started primary care physician unless no problems for proteinuria  Bilateral lower extremity edema  Shortness of breath  Dyspnea on exertion  Volume overload and weight gain secondary to lower extremity edema  Assessment & Plan    Impressions:  Shortness of breath and dyspnea on exertion which is progressively getting worse  Hypertension  Coronary artery disease  Prior coronary artery bypass surgery  Obesity  Obstructive sleep apnea  Paroxysmal atrial fibrillation/initial intent was to cardiovert patient in sinus rhythm to see if it will help with symptoms but patient spontaneously reverted back to sinus rhythm but now back in A. fib with rate control  Patient is currently maintaining sinus rhythm  Tolerating anticoagulation therapy  COPD and chronic hypoxia  Paroxysmal atrial fibrillation patient is currently in atrial fibrillation I am not sure if that is what contributing to his symptoms  Recommendations:  Continued aggressive risk factor modification  Need for weight loss discussed with the patient   continue regular exercise   continue current medical therapy and regular exercise  Continue anticoagulation therapy for stroke prophylaxis for atrial fibrillation  Cardiac catheterization with a patent bypasses  Continue current dose of diuretics but follow-up with primary care physician for renal function assessment and close monitoring on diuretic therapy  Advised patient to continue follow-up with pulmonary CT chest  "findings reviewed and discussed with patient  Discontinue amiodarone  Continue current dose of beta-blockers  Continue Coumadin and close monitoring of PT/INR  Continue close monitoring  Recent labs with primary care physician office  Recent note from the pulmonary evaluation reviewed and discussed with patient  Continue current medical therapy with aspirin 81 mg p.o. once a day warfarin for anticoagulation therapy potassium 20 mg p.o. once a day carvedilol 6.25 mg p.o. twice daily Bumex 2 mg p.o. twice a day Lipitor 80 mg p.o. once a day Norvasc 5 mg p.o. once a day Farxiga 10 mg p.o. once a day lisinopril 5 mg p.o. once a day  Bumex to 2 mg p.o. twice daily  Continued aggressive risk factor modification  Continued aggressive risk factor modification use oxygen at nighttime and also use CPAP at nighttime  Regular exercise  Recent cardiac work-up echocardiogram findings reviewed and discussed with patient  Risk benefits and alternatives for anticoagulation therapy reviewed and discussed patient  Continue follow-up with the pulmonary and primary care  Continue regular exercise and weight loss  Follow-up in office in 6 months        Vitals:  Vitals:    05/16/25 1354   BP: 146/73   BP Location: Right arm   Patient Position: Sitting   Cuff Size: Large Adult   Pulse: 69   SpO2: 93%   Weight: 115 kg (254 lb)   Height: 177.8 cm (70\")       Physical Exam:    General: Alert, cooperative, no distress, appears stated age  Head:  Normocephalic, atraumatic, mucous membranes moist  Eyes:  Conjunctiva/corneas clear, EOM's intact     Neck:  Supple,  no adenopathy;      Lungs: Clear to auscultation bilaterally, no wheezes rhonchi rales are noted  Chest wall: No tenderness  Heart::  Regular rate and rhythm, S1 and S2 normal, no murmur, rub or gallop  Abdomen: Soft, non-tender, nondistended bowel sounds active  Extremities: No cyanosis, clubbing, or edema  Pulses: 2+ and symmetric all extremities  Skin:  No rashes or " lesions  Neuro/psych: A&O x3. CN II through XII are grossly intact with appropriate affect              Lab Results   Component Value Date    GLUCOSE 123 (H) 04/13/2021    BUN 14 04/13/2021    CREATININE 0.99 04/13/2021    BCR 14.1 04/13/2021    K 4.2 04/13/2021    CO2 22.8 04/13/2021    CALCIUM 9.3 04/13/2021    ALBUMIN 4.00 09/26/2017    BILITOT 0.4 09/26/2017    AST 21 09/26/2017    ALT 30 09/26/2017     Results for orders placed during the hospital encounter of 01/18/21    Adult Transthoracic Echo Complete W/ Cont if Necessary Per Protocol    Interpretation Summary  · Left ventricular wall thickness is consistent with concentric hypertrophy.  · Estimated left ventricular EF = 60% Estimated left ventricular EF was in agreement with the calculated left ventricular EF. Left ventricular systolic function is normal.  · Mild dilation of the aortic root is present.  · Estimated right ventricular systolic pressure from tricuspid regurgitation is normal (<35 mmHg).  · Left ventricular diastolic function is consistent with (grade I) impaired relaxation.     Results for orders placed during the hospital encounter of 04/15/21    Cardiac Catheterization/Vascular Study    Narrative  Table formatting from the original result was not included.  Cardiac Catheterization Operative Report    Ricky Nunez  [id number]  4/15/2021  @PCP@    He underwent cardiac catheterization.    Indications for the procedure include: shortness of breath.    Procedure Details:  The risks, benefits, complications, treatment options, and expected outcomes were discussed with the patient. The patient and/or family concurred with the proposed plan, giving informed consent.    After informed consent the patient was brought to the cath lab after appropriate IV hydration was begun and oral premedication was given. He was further sedated with fentanyl. He was prepped and draped in the usual manner. Using the modified Seldinger access technique, a 6  Portuguese sheath was placed in the femoral artery. A left heart catheterization with coronary arteriography was performed. Findings are discussed below.  JR4 catheter and also a 5 Portuguese multipurpose catheter was used to do the selective injection of the vein graft to the diagonal and also selective injection of the vein graft to the marginal and selective injection of the LIMA to the LAD.  Patient tolerated procedure well with no immediate post fluid complications.    After the procedure was completed, sedation was stopped and the sheaths and catheters were all removed. Hemostasis was achieved per established hospital protocols.    Conscious sedation:  Conscious sedation was performed according to protocol.  I supervised and directed an independent trained observer with the assistance of monitoring the patient's level of consciousness and physiologic status throughout the procedure.  Intraoperative service time was 45 minutes.    Findings:    Hemodynamics Central aortic pressure systolic 145 and diastolic 59 with a mean pressure of 106 mmHg  LV end-diastolic pressure was 26 mmHg  There was no gradient across the aortic valve on the pullback of the pigtail catheter  Left Main  left main is angiographically normal bifurcates into left anterior descending and left circumflex arteries.  Short left main with no significant obstructive coronary artery disease involving the left main coronary artery  RCA  nondominant vessel provides only marginal branches has a mid focal area of 50% stenosis  LAD  large-caliber vessel has a mid angiographic 70% stenosis  Diagonal branches 100% occluded in the ostial portion  Circ  mid focal area of 70 to 80% stenosis before the marginal branches  There is also a moderate to large size ramus intermediate branch with a mid angiographic 30 to 40% stenosis  SVG(s)  vein graft to marginal is patent with diffuse angiographic 50 to 60% stenosis involving the ostium and proximal 30 to 40 mm rest of  "the body of the vein graft without any significant obstructive disease involving the body and also the anastomotic site.  This marginal graft retrogradely fills other marginals all the way into the left main  Vein graft to the diagonal is patent without any significant stenosis involving the ostium body or the anastomotic site  ASHLEY  patent LIMA to the LAD with no evidence of any stenosis involving the ostium / body or the anastomotic site there is significant competitive flow in the native LAD so there is not much filling of the LAD from the LIMA even the LIMA is open  LV  normal LV systolic function normal wall motion normal left-sided filling pressures.  Estimated LV ejection fraction of 60%.  Coronary Dominance  left circumflex artery    Estimated Blood Loss:  Minimal    Specimens: None    Complications:  None; patient tolerated the procedure well.    Disposition: PACU - hemodynamically stable.    Condition: stable    Impressions:  Severe native two-vessel coronary artery disease  Significant obstructive disease involving the mid circumflex mid LAD and diagonal branch of the LAD  Patent LIMA to the LAD  Patent vein graft to the diagonal  Patent vein graft to the marginal  Normal LV systolic function with estimated LV ejection fraction of 60%  Elevated left-sided filling pressures    Recommendations:  Medical management for after coronary artery disease  Okay to resume anticoagulation therapy with Coumadin  Continued aggressive risk factor modification  Test results reviewed and discussed with patient and family     No results found for: \"CHOL\", \"CHLPL\", \"TRIG\", \"HDL\", \"LDL\", \"LDLDIRECT\"   Results for orders placed during the hospital encounter of 06/27/24    Stress Test With Myocardial Perfusion One Day    Interpretation Summary    Myocardial perfusion imaging indicates a normal myocardial perfusion study with no evidence of ischemia.    Left ventricular ejection fraction is normal (Calculated EF = 66%).   " Results for orders placed during the hospital encounter of 05/04/23    Mobile Cardiac Outpatient Telemetry    Interpretation Summary  Extended Holter monitor study  Patient was monitored from May 4, 2023 to June 2, 2023  Underlying rhythm is sinus rhythm with minimal heart rate of 50 bpm maximal heart rate of 132 beats minute average heart rate of 65 beats per  No clinically significant pauses  First-degree AV block  A-fib burden of 12%  During A-fib episodes patient a minimal heart rate was 50 bpm maximal rate of 120 beats minute average heart rate of 70 bpm  PVC burden of 1%  PAC burden of 4%  Clinical correlation is           Objective:          Allergies:  No Known Allergies    Medication Review:     Current Outpatient Medications:     albuterol (PROVENTIL HFA;VENTOLIN HFA) 108 (90 Base) MCG/ACT inhaler, Inhale 2 puffs Every 4 (Four) Hours As Needed for Wheezing., Disp: , Rfl:     ALPRAZolam (XANAX) 0.25 MG tablet, Take 1 tablet by mouth 2 (Two) Times a Day As Needed for Anxiety., Disp: , Rfl:     amLODIPine (NORVASC) 5 MG tablet, Take 1 tablet by mouth Daily., Disp: 90 tablet, Rfl: 3    aspirin 81 MG EC tablet, Take 1 tablet by mouth Daily., Disp: , Rfl:     atorvastatin (LIPITOR) 80 MG tablet, Take 1 tablet by mouth Every Night., Disp: , Rfl:     carvedilol (COREG) 6.25 MG tablet, Take 1 tablet by mouth 2 (Two) Times a Day With Meals., Disp: , Rfl:     escitalopram (LEXAPRO) 10 MG tablet, Take 1 tablet by mouth Daily., Disp: , Rfl:     Farxiga 10 MG tablet, Take 10 mg by mouth Daily., Disp: , Rfl:     FeroSul 325 (65 Fe) MG tablet, Take 1 tablet by mouth Every 12 (Twelve) Hours., Disp: , Rfl:     gabapentin (NEURONTIN) 100 MG capsule, Take 1 capsule by mouth 3 (Three) Times a Day., Disp: , Rfl:     glipizide (GLUCOTROL XL) 10 MG 24 hr tablet, Take 1 tablet by mouth Daily., Disp: , Rfl:     HYDROcodone-acetaminophen (NORCO) 7.5-325 MG per tablet, Take  by mouth See Admin Instructions. Take 1 tablet by mouth  every 4-6 hours as needed for pain, Disp: , Rfl:     lisinopril (PRINIVIL,ZESTRIL) 5 MG tablet, Take 1 tablet by mouth Daily., Disp: , Rfl:     metFORMIN (GLUCOPHAGE) 1000 MG tablet, Take 1 tablet by mouth 2 (Two) Times a Day With Meals., Disp: , Rfl:     methocarbamol (ROBAXIN) 750 MG tablet, TAKE 1 TABLET BY MOUTH EVERY 8 HOURS AS NEEDED FOR SPASMS, Disp: , Rfl:     omeprazole (priLOSEC) 20 MG capsule, Take 1 capsule by mouth Daily., Disp: , Rfl:     ondansetron ODT (ZOFRAN-ODT) 4 MG disintegrating tablet, , Disp: , Rfl:     potassium chloride ER (K-TAB) 20 MEQ tablet controlled-release ER tablet, , Disp: , Rfl:     warfarin (COUMADIN) 5 MG tablet, TAKE 1.5 TABLETS 3 DAYS PER WEEK AND 1 TABLET ON ALL OTHER DAYS OR AS DIRECTED BY MD, Disp: 120 tablet, Rfl: 0    bumetanide (BUMEX) 2 MG tablet, Take 1 tablet by mouth 2 (Two) Times a Day., Disp: 60 tablet, Rfl: 2    predniSONE (DELTASONE) 10 MG tablet, , Disp: , Rfl:     Family History:  Family History   Problem Relation Age of Onset    Heart disease Mother     Malig Hyperthermia Neg Hx        Past Medical History:  Past Medical History:   Diagnosis Date    Acid reflux     Anxiety     Arthritis     Cancer     SKIN    COPD (chronic obstructive pulmonary disease)     Coronary artery disease     Depression     Heart attack     Hyperlipidemia     Hypertension     Knee pain     LEFT    Seasonal allergies     Sleep apnea     CPAP       Past surgical History:  Past Surgical History:   Procedure Laterality Date    CARDIAC CATHETERIZATION N/A 4/15/2021    Procedure: Left Heart Cath;  Surgeon: Staci Estrada MD;  Location: [facility] CATH INVASIVE LOCATION;  Service: Cardiovascular;  Laterality: N/A;  with SVG and LIMA injections    CARDIAC CATHETERIZATION  4/15/2021    Procedure: Saphenous Vein Graft;  Surgeon: Staci Estrada MD;  Location: [facility] CATH INVASIVE LOCATION;  Service: Cardiovascular;;    CHOLECYSTECTOMY OPEN      CORONARY ARTERY BYPASS GRAFT  2015     KNEE SURGERY Left     ID ARTHRP KNE CONDYLE&PLATU MEDIAL&LAT COMPARTMENTS Left 10/10/2017    Procedure: LT TOTAL KNEE ARTHROPLASTY;  Surgeon: Maksim Pinto MD;  Location: [facility];  Service: Orthopedics       Social History:  Social History     Socioeconomic History    Marital status:    Tobacco Use    Smoking status: Former     Current packs/day: 0.00     Average packs/day: 2.0 packs/day for 50.0 years (100.0 ttl pk-yrs)     Types: Cigarettes     Start date: 1965     Quit date: 2015     Years since quittin.6    Smokeless tobacco: Never   Vaping Use    Vaping status: Never Used   Substance and Sexual Activity    Alcohol use: No    Drug use: No    Sexual activity: Defer       Review of Systems:  The following systems were reviewed as they relate to the cardiovascular system: Constitutional, Eyes, ENT, Cardiovascular, Respiratory, Gastrointestinal, Integumentary, Neurological, Psychiatric, Hematologic, Endocrine, Musculoskeletal, and Genitourinary. The pertinent cardiovascular findings are reported above with all other cardiovascular points within those systems being negative.    Diagnostic Study Review:     Current Electrocardiogram:    ECG 12 Lead    Date/Time: 2025 2:15 PM  Performed by: Staci Estrada MD    Authorized by: Staci Estrada MD  Comparison: compared with previous ECG   Similar to previous ECG  Rhythm: sinus rhythm  Ectopy: atrial premature contractions  Rate: normal  BPM: 69  Conduction: conduction normal  QRS axis: normal  Other findings: non-specific ST-T wave changes    Clinical impression: abnormal EKG            Advance Care Planning   ACP discussion was held with the patient during this visit. Patient has an advance directive in EMR which is still valid.           NOTE: The following portions of the patient's history were reviewed and updated this visit as appropriate: allergies, current medications, past family history, past medical history,  past social history, past surgical history and problem list.

## 2025-06-13 ENCOUNTER — ANTICOAGULATION VISIT (OUTPATIENT)
Dept: CARDIOLOGY | Facility: CLINIC | Age: 80
End: 2025-06-13
Payer: MEDICARE

## 2025-06-13 DIAGNOSIS — I48.11 LONGSTANDING PERSISTENT ATRIAL FIBRILLATION: Primary | ICD-10-CM

## 2025-06-13 LAB — INR PPP: 2.2 (ref 0.9–1.1)

## 2025-06-13 PROCEDURE — 36416 COLLJ CAPILLARY BLOOD SPEC: CPT | Performed by: INTERNAL MEDICINE

## 2025-06-13 PROCEDURE — 93793 ANTICOAG MGMT PT WARFARIN: CPT | Performed by: INTERNAL MEDICINE

## 2025-06-13 PROCEDURE — 85610 PROTHROMBIN TIME: CPT | Performed by: INTERNAL MEDICINE

## 2025-06-16 RX ORDER — BUMETANIDE 2 MG/1
2 TABLET ORAL 2 TIMES DAILY
Qty: 60 TABLET | Refills: 2 | Status: SHIPPED | OUTPATIENT
Start: 2025-06-16

## 2025-07-02 NOTE — PROGRESS NOTES
Cardiology Office Visit      Encounter Date:  04/06/2021    Patient ID:   Ricky Nunez is a 75 y.o. male.    Reason For Followup:  Shortness of breath  Coronary artery disease  Hypertension    Brief Clinical History:  Dear Dr. Mora, Dc Bearden MD    I had the pleasure of seeing Ricky Nunez today. As you are well aware, this is a 75 y.o. male with a past medical history that is significant for history of hypertension and hyperlipidemia history of acute myocardial infarction cardiac catheterization showed evidence of a severe 2 vessel coronary artery disease with significant stenosis involving the diagonal branch of the LAD with subtotal occlusion that was the culprit lesion for the acute myocardial infarction successfully treated with a placement of a drug eluting stent. Repeat Cardiac catheterization at that time showed evidence of residual disease involving the LAD and also a right coronary artery, His IVUS evaluation of the LAD showed a significant stenosis patient underwent a successful coronary artery bypass surgery patient did very well.     Stress test with no evidence of any significant inducible ischemia  echocardiogram with normal LV systolic function and diastolic dysfunction    Interval History:  Patient denies any symptoms of chest pain  dizziness or syncope  Complaining of significant shortness of breath and dyspnea on exertion which is progressively getting worse  Significant shortness of breath  Significant functional limitation  Assessment & Plan    Impressions:  Shortness of breath and dyspnea on exertion which is progressively getting worse  Hypertension  Coronary artery disease  Prior coronary artery bypass surgery  Obesity  Obstructive sleep apnea  Paroxysmal atrial fibrillation/initial intent was to cardiovert patient in sinus rhythm to see if it will help with symptoms but patient spontaneously reverted back to sinus rhythm but now back in A. fib with rate  REFILL PASSED PER [facility] PROTOCOLS           "control    Recommendations:  Continued aggressive risk factor modification  Need for weight loss discussed with the patient   continue regular exercise   continue current medical therapy and regular exercise  Start patient on anticoagulation therapy for stroke prophylaxis for atrial fibrillation  Risk benefits and alternatives were anticoagulation therapy reviewed and discussed with the patient  Echocardiogram to assess the LV systolic function rule out any significant cardiomyopathy from the newly diagnosed atrial fibrillation  Stress test with no significant inducible ischemia  With ongoing significant change in the patient's symptoms though the stress test was negative plan at this time is to proceed with the further invasive work-up and recommend a cardiac catheterization to see if there is significant obstructive coronary artery disease contributing to patient's symptoms    Continue Coumadin and close monitoring of PT/INR  Continue close monitoring  Further recommendations based on the results of the cardiac catheterization  Follow-up in office in 2 months    Objective:    Vitals:  Vitals:    04/06/21 1005   BP: 172/75   BP Location: Left arm   Pulse: 60   Resp: 18   SpO2: 95%   Weight: 120 kg (265 lb)   Height: 177.8 cm (70\")       Physical Exam:    General: Alert, cooperative, no distress, appears stated age  Head:  Normocephalic, atraumatic, mucous membranes moist  Eyes:  Conjunctiva/corneas clear, EOM's intact     Neck:  Supple,  no adenopathy;      Lungs: Clear to auscultation bilaterally, no wheezes rhonchi rales are noted  Chest wall: No tenderness  Heart::  Regular rate and rhythm, S1 and S2 normal, no murmur, rub or gallop  Abdomen: Soft, non-tender, nondistended bowel sounds active  Extremities: No cyanosis, clubbing, or edema  Pulses: 2+ and symmetric all extremities  Skin:  No rashes or lesions  Neuro/psych: A&O x3. CN II through XII are grossly intact with appropriate affect      Allergies:  No " Known Allergies    Medication Review:     Current Outpatient Medications:   •  albuterol (PROVENTIL HFA;VENTOLIN HFA) 108 (90 Base) MCG/ACT inhaler, Inhale 2 puffs Every 4 (Four) Hours As Needed for Wheezing., Disp: , Rfl:   •  ALPRAZolam (XANAX) 0.25 MG tablet, Take 0.25 mg by mouth 2 (Two) Times a Day As Needed for Anxiety., Disp: , Rfl:   •  amiodarone (PACERONE) 200 MG tablet, Take 1 tablet by mouth Daily., Disp: 30 tablet, Rfl: 1  •  amLODIPine (NORVASC) 10 MG tablet, Take 10 mg by mouth Daily., Disp: , Rfl:   •  aspirin 81 MG EC tablet, Take 81 mg by mouth Daily., Disp: , Rfl:   •  atorvastatin (LIPITOR) 80 MG tablet, Take 80 mg by mouth Every Night., Disp: , Rfl:   •  carvedilol (COREG) 6.25 MG tablet, Take 6.25 mg by mouth 2 (Two) Times a Day With Meals., Disp: , Rfl:   •  escitalopram (LEXAPRO) 10 MG tablet, Take 10 mg by mouth Daily., Disp: , Rfl:   •  glipizide (GLUCOTROL XL) 10 MG 24 hr tablet, Take 10 mg by mouth Daily., Disp: , Rfl:   •  metFORMIN (GLUCOPHAGE) 1000 MG tablet, Take 1,000 mg by mouth 2 (Two) Times a Day With Meals., Disp: , Rfl:   •  omeprazole (priLOSEC) 20 MG capsule, Take 20 mg by mouth Daily., Disp: , Rfl:   •  warfarin (COUMADIN) 5 MG tablet, Take 5 mg by mouth 3 (Three) Times a Week. Sunday, Tuesday, and Thursday, Disp: , Rfl:   •  warfarin (COUMADIN) 7.5 MG tablet, Take 7.5 mg by mouth 4 (Four) Times a Week. Monday, Wednesday, Friday, and Saturday, Disp: , Rfl:     Family History:  Family History   Problem Relation Age of Onset   • Heart disease Mother    • Malig Hyperthermia Neg Hx        Past Medical History:  Past Medical History:   Diagnosis Date   • Acid reflux    • Anxiety    • Arthritis    • Cancer (CMS/HCC)     SKIN   • COPD (chronic obstructive pulmonary disease) (CMS/HCC)    • Coronary artery disease    • Depression    • Heart attack (CMS/HCC)    • Hyperlipidemia    • Hypertension    • Knee pain     LEFT   • Seasonal allergies    • Sleep apnea     CPAP       Past  surgical History:  Past Surgical History:   Procedure Laterality Date   • CHOLECYSTECTOMY OPEN     • CORONARY ARTERY BYPASS GRAFT     • KNEE SURGERY Left    • IL TOTAL KNEE ARTHROPLASTY Left 10/10/2017    Procedure: LT TOTAL KNEE ARTHROPLASTY;  Surgeon: Maksim Pinto MD;  Location: [facility];  Service: Orthopedics       Social History:  Social History     Socioeconomic History   • Marital status:      Spouse name: Not on file   • Number of children: Not on file   • Years of education: Not on file   • Highest education level: Not on file   Tobacco Use   • Smoking status: Former Smoker     Packs/day: 2.00     Years: 50.00     Pack years: 100.00     Types: Cigarettes     Quit date: 2015     Years since quittin.5   • Smokeless tobacco: Never Used   Vaping Use   • Vaping Use: Never used   Substance and Sexual Activity   • Alcohol use: No   • Drug use: No   • Sexual activity: Defer       Review of Systems:  The following systems were reviewed as they relate to the cardiovascular system: Constitutional, Eyes, ENT, Cardiovascular, Respiratory, Gastrointestinal, Integumentary, Neurological, Psychiatric, Hematologic, Endocrine, Musculoskeletal, and Genitourinary. The pertinent cardiovascular findings are reported above with all other cardiovascular points within those systems being negative.    Diagnostic Study Review:     Current Electrocardiogram:    ECG 12 Lead    Date/Time: 2021 10:23 AM  Performed by: Staci Estrada MD  Authorized by: Staci Estrada MD   Comparison: compared with previous ECG   Similar to previous ECG  Rhythm: atrial fibrillation  Rate: normal  BPM: 68  Conduction: conduction normal  QRS axis: normal  Other findings: non-specific ST-T wave changes    Clinical impression: abnormal EKG              NOTE: The following portions of the patient's history were reviewed and updated this visit as appropriate: allergies, current medications, past family history,  past medical history, past social history, past surgical history and problem list.

## 2025-07-18 ENCOUNTER — ANTICOAGULATION VISIT (OUTPATIENT)
Dept: CARDIOLOGY | Facility: CLINIC | Age: 80
End: 2025-07-18
Payer: MEDICARE

## 2025-07-18 DIAGNOSIS — I48.11 LONGSTANDING PERSISTENT ATRIAL FIBRILLATION: Primary | ICD-10-CM

## 2025-07-18 LAB — INR PPP: 2 (ref 0.9–1.1)

## 2025-07-18 PROCEDURE — 93793 ANTICOAG MGMT PT WARFARIN: CPT | Performed by: INTERNAL MEDICINE

## 2025-07-18 PROCEDURE — 85610 PROTHROMBIN TIME: CPT | Performed by: INTERNAL MEDICINE

## 2025-07-18 PROCEDURE — 36416 COLLJ CAPILLARY BLOOD SPEC: CPT | Performed by: INTERNAL MEDICINE

## 2025-08-15 ENCOUNTER — ANTICOAGULATION VISIT (OUTPATIENT)
Dept: CARDIOLOGY | Facility: CLINIC | Age: 80
End: 2025-08-15
Payer: MEDICARE

## 2025-08-15 DIAGNOSIS — I48.11 LONGSTANDING PERSISTENT ATRIAL FIBRILLATION: Primary | ICD-10-CM

## 2025-08-15 LAB — INR PPP: 2.6 (ref 0.9–1.1)

## 2025-08-15 PROCEDURE — 93793 ANTICOAG MGMT PT WARFARIN: CPT | Performed by: INTERNAL MEDICINE

## 2025-08-15 PROCEDURE — 36416 COLLJ CAPILLARY BLOOD SPEC: CPT | Performed by: INTERNAL MEDICINE

## 2025-08-15 PROCEDURE — 85610 PROTHROMBIN TIME: CPT | Performed by: INTERNAL MEDICINE

## 2025-08-25 RX ORDER — WARFARIN SODIUM 5 MG/1
TABLET ORAL
Qty: 120 TABLET | Refills: 0 | Status: SHIPPED | OUTPATIENT
Start: 2025-08-25

## (undated) DEVICE — UNDERCAST PADDING: Brand: DEROYAL

## (undated) DEVICE — DECANT BG O JET

## (undated) DEVICE — MAT FLR ABSORBENT LG 4FT 10 2.5FT

## (undated) DEVICE — PK TRY HEART CATH 50

## (undated) DEVICE — KT DRN EVAC WND PVC PCH WTROC RND 10F400

## (undated) DEVICE — SPNG GZ WOVN 4X4IN 12PLY 10/BX STRL

## (undated) DEVICE — ST ACC MICROPUNCTURE STFF/CANN PLAT/TP 4F 21G 40CM

## (undated) DEVICE — ANTIBACTERIAL UNDYED BRAIDED (POLYGLACTIN 910), SYNTHETIC ABSORBABLE SUTURE: Brand: COATED VICRYL

## (undated) DEVICE — PINNACLE INTRODUCER SHEATH: Brand: PINNACLE

## (undated) DEVICE — CATH DIAG EXPO .045 MPA2 5F 100CM

## (undated) DEVICE — APPL CHLORAPREP W/TINT 26ML ORNG

## (undated) DEVICE — GW PTFE EMERALD HEPCOAT FC J TIP STD .035 3MM 150CM

## (undated) DEVICE — PIN TROC SIGNATURE PK/2

## (undated) DEVICE — NDL SPINE 20G 3 1/2 YEL STRL 1P/U

## (undated) DEVICE — ANGIO-SEAL VIP VASCULAR CLOSURE DEVICE: Brand: ANGIO-SEAL

## (undated) DEVICE — GLV SURG BIOGEL LTX PF 7 1/2

## (undated) DEVICE — DUAL CUT SAGITTAL BLADE

## (undated) DEVICE — CATH DIAG IMPULSE FL4 6F 100CM

## (undated) DEVICE — CATH DIAG IMPULSE FR4 6F 100CM

## (undated) DEVICE — BNDG ELAS ELITE V/CLOSE 4IN 5YD LF STRL

## (undated) DEVICE — BNDG ELAS ELITE V/CLOSE 6IN 5YD LF STRL

## (undated) DEVICE — PK KN TOTL 40

## (undated) DEVICE — PAD,ABDOMINAL,8"X10",ST,LF: Brand: MEDLINE

## (undated) DEVICE — CATH DIAG IMPULSE PIG .056 6F 110CM

## (undated) DEVICE — STPLR SKIN VISISTAT WD 35CT

## (undated) DEVICE — OCCLUSIVE GAUZE STRIP,3% BISMUTH TRIBROMOPHENATE IN PETROLATUM BLEND: Brand: XEROFORM

## (undated) DEVICE — ENCORE® LATEX ORTHO SIZE 7.5, STERILE LATEX POWDER-FREE SURGICAL GLOVE: Brand: ENCORE

## (undated) DEVICE — DRAPE,REIN 53X77,STERILE: Brand: MEDLINE